# Patient Record
Sex: MALE | Race: WHITE | Employment: FULL TIME | URBAN - NONMETROPOLITAN AREA
[De-identification: names, ages, dates, MRNs, and addresses within clinical notes are randomized per-mention and may not be internally consistent; named-entity substitution may affect disease eponyms.]

---

## 2019-03-24 LAB
AVERAGE GLUCOSE: ABNORMAL
CHOLESTEROL, TOTAL: 118 MG/DL
CHOLESTEROL/HDL RATIO: 3.7
HBA1C MFR BLD: 7.5 %
HDLC SERPL-MCNC: 32 MG/DL (ref 35–70)
LDL CHOLESTEROL CALCULATED: 73 MG/DL (ref 0–160)
TRIGL SERPL-MCNC: 58 MG/DL
VLDLC SERPL CALC-MCNC: ABNORMAL MG/DL

## 2019-04-17 VITALS
BODY MASS INDEX: 36.22 KG/M2 | WEIGHT: 253 LBS | TEMPERATURE: 97 F | DIASTOLIC BLOOD PRESSURE: 70 MMHG | SYSTOLIC BLOOD PRESSURE: 120 MMHG | HEIGHT: 70 IN | HEART RATE: 100 BPM

## 2019-04-17 RX ORDER — VARENICLINE TARTRATE 0.5 MG/1
0.5 TABLET, FILM COATED ORAL
COMMUNITY
End: 2019-06-25

## 2019-04-17 RX ORDER — VARENICLINE TARTRATE 1 MG/1
1 TABLET, FILM COATED ORAL 2 TIMES DAILY
COMMUNITY
End: 2019-06-25

## 2019-06-25 ENCOUNTER — OFFICE VISIT (OUTPATIENT)
Dept: FAMILY MEDICINE CLINIC | Age: 54
End: 2019-06-25
Payer: COMMERCIAL

## 2019-06-25 VITALS
WEIGHT: 259 LBS | BODY MASS INDEX: 37.08 KG/M2 | SYSTOLIC BLOOD PRESSURE: 118 MMHG | HEIGHT: 70 IN | HEART RATE: 104 BPM | DIASTOLIC BLOOD PRESSURE: 84 MMHG | TEMPERATURE: 98.7 F | OXYGEN SATURATION: 98 %

## 2019-06-25 DIAGNOSIS — Z79.4 TYPE 2 DIABETES MELLITUS WITH COMPLICATION, WITH LONG-TERM CURRENT USE OF INSULIN (HCC): Primary | ICD-10-CM

## 2019-06-25 DIAGNOSIS — N52.9 ERECTILE DYSFUNCTION, UNSPECIFIED ERECTILE DYSFUNCTION TYPE: ICD-10-CM

## 2019-06-25 DIAGNOSIS — E11.8 TYPE 2 DIABETES MELLITUS WITH COMPLICATION, WITH LONG-TERM CURRENT USE OF INSULIN (HCC): Primary | ICD-10-CM

## 2019-06-25 DIAGNOSIS — I10 BENIGN ESSENTIAL HYPERTENSION: ICD-10-CM

## 2019-06-25 DIAGNOSIS — Z72.0 TOBACCO USE: ICD-10-CM

## 2019-06-25 DIAGNOSIS — M70.21 OLECRANON BURSITIS OF RIGHT ELBOW: ICD-10-CM

## 2019-06-25 DIAGNOSIS — E78.2 MIXED HYPERLIPIDEMIA: ICD-10-CM

## 2019-06-25 DIAGNOSIS — M15.9 GENERALIZED OSTEOARTHRITIS: ICD-10-CM

## 2019-06-25 PROCEDURE — 99214 OFFICE O/P EST MOD 30 MIN: CPT | Performed by: INTERNAL MEDICINE

## 2019-06-25 RX ORDER — METHYLPREDNISOLONE 4 MG/1
TABLET ORAL
Qty: 1 KIT | Refills: 0 | Status: SHIPPED | OUTPATIENT
Start: 2019-06-25 | End: 2019-07-01

## 2019-06-25 RX ORDER — DULAGLUTIDE 1.5 MG/.5ML
1.5 INJECTION, SOLUTION SUBCUTANEOUS
Refills: 1 | COMMUNITY
Start: 2019-06-04 | End: 2022-03-14 | Stop reason: DRUGHIGH

## 2019-06-25 RX ORDER — LISINOPRIL 20 MG/1
20 TABLET ORAL 2 TIMES DAILY
Qty: 180 TABLET | Refills: 1 | Status: SHIPPED | OUTPATIENT
Start: 2019-06-25 | End: 2019-12-18 | Stop reason: SDUPTHER

## 2019-06-25 RX ORDER — DAPAGLIFLOZIN 10 MG/1
1 TABLET, FILM COATED ORAL DAILY
Refills: 1 | COMMUNITY
Start: 2019-04-02 | End: 2020-12-18 | Stop reason: ALTCHOICE

## 2019-06-25 ASSESSMENT — PATIENT HEALTH QUESTIONNAIRE - PHQ9
2. FEELING DOWN, DEPRESSED OR HOPELESS: 1
SUM OF ALL RESPONSES TO PHQ QUESTIONS 1-9: 1
SUM OF ALL RESPONSES TO PHQ QUESTIONS 1-9: 1
SUM OF ALL RESPONSES TO PHQ9 QUESTIONS 1 & 2: 1
1. LITTLE INTEREST OR PLEASURE IN DOING THINGS: 0

## 2019-06-25 ASSESSMENT — ENCOUNTER SYMPTOMS
SHORTNESS OF BREATH: 0
ABDOMINAL PAIN: 0
CHEST TIGHTNESS: 0
RHINORRHEA: 0
CONSTIPATION: 0
SORE THROAT: 0
NAUSEA: 0
BLOOD IN STOOL: 0
VOMITING: 0
EYE PAIN: 0
DIARRHEA: 0
COUGH: 0

## 2019-06-25 NOTE — PROGRESS NOTES
[facility]) Physicians   Internal Medicine     2019  Carlos Manuel Varner : 1965 Sex: male  Age:53 y.o. Chief Complaint   Patient presents with    Hypertension        HPI:   Patient presents to office for review and management of chronic medical conditions. States has swelling to right elbow. Not painful. Asking for aspiration     - States still has nasal congestion Some rhinorrhea. No ear pain. No facial pressure. No fever of chills. No chest pain, SOB. States cough. States taking allavert. Has ProAir inhaler if needed, uses sparingly.    - States still having bilateral knee pain. States has seen ortho and had injection to left knee. No erythema. No buckling. No locking. Worse with ambulation. No hip pain. no back pain. Taking OTC supplement (aleve). No follow up. - States erectile issues are stable. States having difficulty maintaining erection. Stable. - States has had follow up with endocrinology. Basaglar 18 units and glipizide 5mg 2 tabs am and 2 pm  and metformin tid. On Farxiga. On trulicity. States some hypoglycemia. States has had eye exam and  podiatry exam. States sugar running 120-140. All < 200. States walking alot. States last A1c was 7.5 per  Patient (2019), follow up 2019    - States has high cholesterol. States was placed on Lipitor by endocrinology. States watches diet. - States high blood pressure. States does not check blood pressure at home. On lisinopril. No reported  side effects.    - States walks 8-10 miles per day at work. Health Maintenance   - immunizations:   Influenza Vaccination - (2018)  Pneumonia Vaccination - declines   Shingles vaccine (shingrix) - declines  Tetanus Vaccination - ()     - Screenings:   Colonoscopy  - (2017) - normal - follow up 10yrs  Prostate     Couseled on Home Safety     ROS:  Review of Systems   Constitutional: Negative for appetite change, chills, fever and unexpected weight change.    HENT: Negative for congestion, rhinorrhea and sore throat. Eyes: Negative for pain and visual disturbance. Respiratory: Negative for cough, chest tightness and shortness of breath. Cardiovascular: Negative for chest pain and palpitations. Gastrointestinal: Negative for abdominal pain, blood in stool, constipation, diarrhea, nausea and vomiting. Genitourinary: Negative for difficulty urinating, dysuria, hematuria, scrotal swelling, testicular pain and urgency. Musculoskeletal: Positive for arthralgias. Negative for gait problem. Skin: Negative for rash. Neurological: Negative for dizziness, syncope, weakness, light-headedness and headaches. Hematological: Negative for adenopathy. Does not bruise/bleed easily. Psychiatric/Behavioral: Negative for suicidal ideas. The patient is not nervous/anxious. Current Outpatient Medications:     insulin glargine (BASAGLAR KWIKPEN) 100 UNIT/ML injection pen, Inject 18 Units into the skin every morning, Disp: , Rfl:     FARXIGA 10 MG tablet, Take 1 tablet by mouth daily, Disp: , Rfl: 1    TRULICITY 1.5 GE/5.7GQ SOPN, 1.5 mg by Subcutaneous Infusion route every 7 days, Disp: , Rfl: 1    lisinopril (PRINIVIL;ZESTRIL) 20 MG tablet, Take 1 tablet by mouth 2 times daily, Disp: 180 tablet, Rfl: 1    methylPREDNISolone (MEDROL, ALVIN,) 4 MG tablet, Take by mouth., Disp: 1 kit, Rfl: 0    metFORMIN (GLUCOPHAGE) 850 MG tablet, Take 500 mg by mouth 3 times daily , Disp: , Rfl:     glipiZIDE (GLUCOTROL) 5 MG tablet, Take 5 mg by mouth 2 times daily (before meals) 2 po q am and 1 po q pm, Disp: , Rfl:     Allergies   Allergen Reactions    No Known Allergies        Past Medical History:   Diagnosis Date    Depression     Hyperlipidemia     Hypertension     Type 2 diabetes mellitus without complication (HCC)        Past Surgical History:   Procedure Laterality Date    SHOULDER ARTHROSCOPY Right     VASECTOMY         No family history on file.     Social History Socioeconomic History    Marital status:      Spouse name: Not on file    Number of children: Not on file    Years of education: Not on file    Highest education level: Not on file   Occupational History    Not on file   Social Needs    Financial resource strain: Not on file    Food insecurity:     Worry: Not on file     Inability: Not on file    Transportation needs:     Medical: Not on file     Non-medical: Not on file   Tobacco Use    Smoking status: Current Some Day Smoker    Smokeless tobacco: Former User     Types: Chew    Tobacco comment: 5-6 cigarettes qd, ocassional cigar   Substance and Sexual Activity    Alcohol use: Yes     Alcohol/week: 2.4 oz     Types: 4 Cans of beer per week    Drug use: No    Sexual activity: Not on file   Lifestyle    Physical activity:     Days per week: Not on file     Minutes per session: Not on file    Stress: Not on file   Relationships    Social connections:     Talks on phone: Not on file     Gets together: Not on file     Attends Anabaptism service: Not on file     Active member of club or organization: Not on file     Attends meetings of clubs or organizations: Not on file     Relationship status: Not on file    Intimate partner violence:     Fear of current or ex partner: Not on file     Emotionally abused: Not on file     Physically abused: Not on file     Forced sexual activity: Not on file   Other Topics Concern    Not on file   Social History Narrative    Not on file       Vitals:    06/25/19 1417   BP: 118/84   Pulse: 104   Temp: 98.7 °F (37.1 °C)   SpO2: 98%   Weight: 259 lb (117.5 kg)   Height: 5' 10\" (1.778 m)       Exam:  Physical Exam   Constitutional: He is oriented to person, place, and time. He appears well-developed and well-nourished. HENT:   Head: Normocephalic and atraumatic.    Right Ear: External ear normal.   Left Ear: External ear normal.   Mouth/Throat: Oropharynx is clear and moist.   Eyes: Pupils are equal, round, and reactive to light. EOM are normal.   Neck: Normal range of motion. Neck supple. No thyromegaly present. Cardiovascular: Normal rate, regular rhythm and normal heart sounds. No murmur heard. Pulmonary/Chest: Effort normal and breath sounds normal. He has no wheezes. He has no rales. Abdominal: Soft. Bowel sounds are normal. There is no tenderness. There is no rebound and no guarding. Musculoskeletal: Normal range of motion. He exhibits no edema. Lymphadenopathy:     He has no cervical adenopathy. Neurological: He is alert and oriented to person, place, and time. No cranial nerve deficit. Skin: Skin is warm and dry. Psychiatric: He has a normal mood and affect. Judgment normal.       Assessment and Plan:    Adrian Reyes was seen today for hypertension. Diagnoses and all orders for this visit:    Type 2 diabetes mellitus with complication, with long-term current use of insulin ([address]  - continue present treatment  - following with endocrinology  - now on Basaglar 18 units  - on glucotrol, metformin farxiga and trulicity  - last R9Z was 7.5 (4/2019)    On Statin   On ACE  follows with optho     Benign essential hypertension  - continue present treatment  - monitor blood pressure at home  - watch diet, low sodium   - on lisinopril   - Stable     Mixed hyperlipidemia  - continue present treatment  - watch diet   - on lipitor  - follow labs     Olecranon bursitis of right elbow  - medrol pack  - if not better ortho      Generalized osteoarthritis  - following with ortho  - s/p synvisc to knee   - stable    Tobacco use  - Counseled to stop smoking   - could not afford chantix  - discussed wellbutrin     Erectile dysfunction, unspecified erectile dysfunction type  - continue present treatment  - poss related to DM and HTN  - stable    Return in about 3 months (around 9/25/2019) for check up and review.       Orders Placed This Encounter   Procedures    Comprehensive Metabolic Panel     Standing Status: Future     Standing Expiration Date:   6/25/2020    CBC with Differential     Standing Status:   Future     Standing Expiration Date:   6/25/2020    Magnesium     Standing Status:   Future     Standing Expiration Date:   6/25/2020    Lipid, Fasting     Standing Status:   Future     Standing Expiration Date:   6/25/2020    Urinalysis     Standing Status:   Future     Standing Expiration Date:   6/25/2020   Darin Daley     Standing Status:   Future     Standing Expiration Date:   6/25/2020       Mario Latham MD  6/25/2019  2:48 PM

## 2019-06-25 NOTE — LETTER
[facility], [facility] [address]  Phone: [phone]  Fax: [phone]    Shane Grande MD        June 25, 2019     Patient: Arturo Aparicio   YOB: 1965   Date of Visit: 6/25/2019       To Whom it May Concern:    Arturo Aparicio was seen in my clinic on 6/25/2019. If you have any questions or concerns, please don't hesitate to call.     Sincerely,         Shane Grande MD

## 2019-07-23 ENCOUNTER — OFFICE VISIT (OUTPATIENT)
Dept: FAMILY MEDICINE CLINIC | Age: 54
End: 2019-07-23
Payer: COMMERCIAL

## 2019-07-23 VITALS
BODY MASS INDEX: 37.8 KG/M2 | SYSTOLIC BLOOD PRESSURE: 110 MMHG | DIASTOLIC BLOOD PRESSURE: 80 MMHG | TEMPERATURE: 97.7 F | HEIGHT: 70 IN | WEIGHT: 264 LBS | OXYGEN SATURATION: 95 % | HEART RATE: 112 BPM

## 2019-07-23 DIAGNOSIS — J06.9 ACUTE UPPER RESPIRATORY INFECTION: Primary | ICD-10-CM

## 2019-07-23 PROCEDURE — 99213 OFFICE O/P EST LOW 20 MIN: CPT | Performed by: INTERNAL MEDICINE

## 2019-07-23 PROCEDURE — 96372 THER/PROPH/DIAG INJ SC/IM: CPT | Performed by: INTERNAL MEDICINE

## 2019-07-23 RX ORDER — TRIAMCINOLONE ACETONIDE 40 MG/ML
40 INJECTION, SUSPENSION INTRA-ARTICULAR; INTRAMUSCULAR ONCE
Status: COMPLETED | OUTPATIENT
Start: 2019-07-23 | End: 2019-07-23

## 2019-07-23 RX ORDER — DOXYCYCLINE HYCLATE 100 MG
100 TABLET ORAL 2 TIMES DAILY
Qty: 20 TABLET | Refills: 0 | Status: SHIPPED | OUTPATIENT
Start: 2019-07-23 | End: 2019-08-02

## 2019-07-23 RX ORDER — BENZONATATE 100 MG/1
100 CAPSULE ORAL 3 TIMES DAILY PRN
Qty: 30 CAPSULE | Refills: 0 | Status: SHIPPED | OUTPATIENT
Start: 2019-07-23 | End: 2019-07-30

## 2019-07-23 RX ADMIN — TRIAMCINOLONE ACETONIDE 40 MG: 40 INJECTION, SUSPENSION INTRA-ARTICULAR; INTRAMUSCULAR at 11:38

## 2019-07-25 LAB
AVERAGE GLUCOSE: NORMAL
HBA1C MFR BLD: 8.5 %

## 2019-09-11 RX ORDER — CEFDINIR 300 MG/1
300 CAPSULE ORAL 2 TIMES DAILY
COMMUNITY
End: 2019-09-20 | Stop reason: ALTCHOICE

## 2019-09-11 RX ORDER — ALBUTEROL SULFATE 90 UG/1
2 AEROSOL, METERED RESPIRATORY (INHALATION)
COMMUNITY
End: 2019-12-18 | Stop reason: SDUPTHER

## 2019-09-11 RX ORDER — VARENICLINE TARTRATE 1 MG/1
1 TABLET, FILM COATED ORAL 2 TIMES DAILY
COMMUNITY
End: 2019-09-20

## 2019-09-11 RX ORDER — ATORVASTATIN CALCIUM 40 MG/1
40 TABLET, FILM COATED ORAL DAILY
COMMUNITY

## 2019-09-20 ENCOUNTER — OFFICE VISIT (OUTPATIENT)
Dept: FAMILY MEDICINE CLINIC | Age: 54
End: 2019-09-20
Payer: COMMERCIAL

## 2019-09-20 VITALS
BODY MASS INDEX: 36.08 KG/M2 | HEIGHT: 70 IN | HEART RATE: 109 BPM | SYSTOLIC BLOOD PRESSURE: 114 MMHG | OXYGEN SATURATION: 98 % | WEIGHT: 252 LBS | TEMPERATURE: 98.9 F | DIASTOLIC BLOOD PRESSURE: 80 MMHG

## 2019-09-20 DIAGNOSIS — N52.9 ERECTILE DYSFUNCTION, UNSPECIFIED ERECTILE DYSFUNCTION TYPE: ICD-10-CM

## 2019-09-20 DIAGNOSIS — I10 BENIGN ESSENTIAL HYPERTENSION: ICD-10-CM

## 2019-09-20 DIAGNOSIS — E11.8 TYPE 2 DIABETES MELLITUS WITH COMPLICATION, WITH LONG-TERM CURRENT USE OF INSULIN (HCC): Primary | ICD-10-CM

## 2019-09-20 DIAGNOSIS — M15.9 GENERALIZED OSTEOARTHRITIS: ICD-10-CM

## 2019-09-20 DIAGNOSIS — J30.9 CHRONIC ALLERGIC RHINITIS: ICD-10-CM

## 2019-09-20 DIAGNOSIS — Z79.4 TYPE 2 DIABETES MELLITUS WITH COMPLICATION, WITH LONG-TERM CURRENT USE OF INSULIN (HCC): Primary | ICD-10-CM

## 2019-09-20 DIAGNOSIS — Z72.0 TOBACCO USE: ICD-10-CM

## 2019-09-20 DIAGNOSIS — E78.2 MIXED HYPERLIPIDEMIA: ICD-10-CM

## 2019-09-20 DIAGNOSIS — M70.21 OLECRANON BURSITIS OF RIGHT ELBOW: ICD-10-CM

## 2019-09-20 PROCEDURE — G8417 CALC BMI ABV UP PARAM F/U: HCPCS | Performed by: INTERNAL MEDICINE

## 2019-09-20 PROCEDURE — 3045F PR MOST RECENT HEMOGLOBIN A1C LEVEL 7.0-9.0%: CPT | Performed by: INTERNAL MEDICINE

## 2019-09-20 PROCEDURE — G8427 DOCREV CUR MEDS BY ELIG CLIN: HCPCS | Performed by: INTERNAL MEDICINE

## 2019-09-20 PROCEDURE — 4004F PT TOBACCO SCREEN RCVD TLK: CPT | Performed by: INTERNAL MEDICINE

## 2019-09-20 PROCEDURE — 99213 OFFICE O/P EST LOW 20 MIN: CPT | Performed by: INTERNAL MEDICINE

## 2019-09-20 PROCEDURE — 2022F DILAT RTA XM EVC RTNOPTHY: CPT | Performed by: INTERNAL MEDICINE

## 2019-09-20 PROCEDURE — 3017F COLORECTAL CA SCREEN DOC REV: CPT | Performed by: INTERNAL MEDICINE

## 2019-09-20 RX ORDER — MONTELUKAST SODIUM 10 MG/1
10 TABLET ORAL DAILY
Qty: 30 TABLET | Refills: 3 | Status: SHIPPED | OUTPATIENT
Start: 2019-09-20 | End: 2019-12-18 | Stop reason: SDUPTHER

## 2019-09-20 ASSESSMENT — ENCOUNTER SYMPTOMS
NAUSEA: 0
RHINORRHEA: 0
COUGH: 0
CHEST TIGHTNESS: 0
EYE PAIN: 0
SORE THROAT: 0
ABDOMINAL PAIN: 0
SHORTNESS OF BREATH: 0
DIARRHEA: 0
VOMITING: 0
CONSTIPATION: 0
BLOOD IN STOOL: 0

## 2019-09-20 NOTE — PROGRESS NOTES
syndrome     Hyperlipidemia     Hypertension     Low back pain     Neck pain     Neck sprain     Psychosexual dysfunction associated with inhibited libido     Spasm     Type 2 diabetes mellitus without complication [facility])        Past Surgical History:   Procedure Laterality Date    COLONOSCOPY      12/8/2017 normal     SHOULDER ARTHROSCOPY Right     VASECTOMY  04/2017       No family history on file. Social History     Socioeconomic History    Marital status:      Spouse name: Not on file    Number of children: Not on file    Years of education: Not on file    Highest education level: Not on file   Occupational History    Not on file   Social Needs    Financial resource strain: Not on file    Food insecurity:     Worry: Not on file     Inability: Not on file    Transportation needs:     Medical: Not on file     Non-medical: Not on file   Tobacco Use    Smoking status: Current Some Day Smoker    Smokeless tobacco: Former User     Types: Chew    Tobacco comment: 5-6 cigarettes qd, ocassional cigar   Substance and Sexual Activity    Alcohol use:  Yes     Alcohol/week: 4.0 standard drinks     Types: 4 Cans of beer per week    Drug use: No    Sexual activity: Not on file   Lifestyle    Physical activity:     Days per week: Not on file     Minutes per session: Not on file    Stress: Not on file   Relationships    Social connections:     Talks on phone: Not on file     Gets together: Not on file     Attends Confucianist service: Not on file     Active member of club or organization: Not on file     Attends meetings of clubs or organizations: Not on file     Relationship status: Not on file    Intimate partner violence:     Fear of current or ex partner: Not on file     Emotionally abused: Not on file     Physically abused: Not on file     Forced sexual activity: Not on file   Other Topics Concern    Not on file   Social History Narrative    Not on file       Vitals:    09/20/19 1531 BP: 114/80   Pulse: 109   Temp: 98.9 °F (37.2 °C)   SpO2: 98%   Weight: 252 lb (114.3 kg)   Height: 5' 10\" (1.778 m)       Exam:  Physical Exam   Constitutional: He is oriented to person, place, and time. He appears well-developed and well-nourished. HENT:   Head: Normocephalic and atraumatic. Right Ear: External ear normal.   Left Ear: External ear normal.   Mouth/Throat: Oropharynx is clear and moist.   Eyes: Pupils are equal, round, and reactive to light. EOM are normal.   Neck: Normal range of motion. Neck supple. No thyromegaly present. Cardiovascular: Normal rate, regular rhythm and normal heart sounds. No murmur heard. Pulmonary/Chest: Effort normal and breath sounds normal. He has no wheezes. He has no rales. Abdominal: Soft. Bowel sounds are normal. There is no tenderness. There is no rebound and no guarding. Musculoskeletal: Normal range of motion. He exhibits no edema. Lymphadenopathy:     He has no cervical adenopathy. Neurological: He is alert and oriented to person, place, and time. No cranial nerve deficit. Skin: Skin is warm and dry. Psychiatric: He has a normal mood and affect. Judgment normal.       Assessment and Plan:    Elli Roberto was seen today for hypertension.     Diagnoses and all orders for this visit:    Chronic allergic rhinitis  - not improving   - hs tried antihistamine   - recommend floase or nasacort  - add Singulair   - declines pulm for poss asthma     Type 2 diabetes mellitus with complication, with long-term current use of insulin ([address]  - continue present treatment  - following with endocrinology  - now on Basaglar 18 units  - on glucotrol, metformin farxiga and trulicity  - last O8H was 7.5 (4/2019)    On Statin   On ACE  follows with optho     Benign essential hypertension  - continue present treatment  - monitor blood pressure at home  - watch diet, low sodium   - on lisinopril   - Stable     Mixed hyperlipidemia  - continue present treatment  - watch diet

## 2019-10-31 LAB
AVERAGE GLUCOSE: NORMAL
HBA1C MFR BLD: 7.8 %

## 2019-12-18 ENCOUNTER — TELEPHONE (OUTPATIENT)
Dept: FAMILY MEDICINE CLINIC | Age: 54
End: 2019-12-18

## 2019-12-18 ENCOUNTER — OFFICE VISIT (OUTPATIENT)
Dept: FAMILY MEDICINE CLINIC | Age: 54
End: 2019-12-18
Payer: COMMERCIAL

## 2019-12-18 ENCOUNTER — HOSPITAL ENCOUNTER (OUTPATIENT)
Age: 54
Discharge: HOME | End: 2019-12-20
Payer: COMMERCIAL

## 2019-12-18 VITALS
TEMPERATURE: 97.8 F | WEIGHT: 252 LBS | BODY MASS INDEX: 36.08 KG/M2 | DIASTOLIC BLOOD PRESSURE: 80 MMHG | HEART RATE: 104 BPM | OXYGEN SATURATION: 95 % | HEIGHT: 70 IN | SYSTOLIC BLOOD PRESSURE: 104 MMHG

## 2019-12-18 DIAGNOSIS — Z72.0 TOBACCO USE: ICD-10-CM

## 2019-12-18 DIAGNOSIS — M15.9 GENERALIZED OSTEOARTHRITIS: ICD-10-CM

## 2019-12-18 DIAGNOSIS — J30.9 CHRONIC ALLERGIC RHINITIS: ICD-10-CM

## 2019-12-18 DIAGNOSIS — Z23 NEED FOR IMMUNIZATION AGAINST INFLUENZA: Primary | ICD-10-CM

## 2019-12-18 DIAGNOSIS — R53.83 OTHER FATIGUE: ICD-10-CM

## 2019-12-18 DIAGNOSIS — M25.511 ACUTE PAIN OF RIGHT SHOULDER: ICD-10-CM

## 2019-12-18 DIAGNOSIS — E78.2 MIXED HYPERLIPIDEMIA: ICD-10-CM

## 2019-12-18 DIAGNOSIS — E55.9 VITAMIN D DEFICIENCY: ICD-10-CM

## 2019-12-18 DIAGNOSIS — Z79.4 TYPE 2 DIABETES MELLITUS WITH COMPLICATION, WITH LONG-TERM CURRENT USE OF INSULIN (HCC): ICD-10-CM

## 2019-12-18 DIAGNOSIS — Z12.5 SCREENING FOR MALIGNANT NEOPLASM OF PROSTATE: ICD-10-CM

## 2019-12-18 DIAGNOSIS — Z79.4 TYPE 2 DIABETES MELLITUS WITHOUT COMPLICATION, WITH LONG-TERM CURRENT USE OF INSULIN (HCC): ICD-10-CM

## 2019-12-18 DIAGNOSIS — E11.8 TYPE 2 DIABETES MELLITUS WITH COMPLICATION, WITH LONG-TERM CURRENT USE OF INSULIN (HCC): ICD-10-CM

## 2019-12-18 DIAGNOSIS — N52.9 ERECTILE DYSFUNCTION, UNSPECIFIED ERECTILE DYSFUNCTION TYPE: ICD-10-CM

## 2019-12-18 DIAGNOSIS — E11.9 TYPE 2 DIABETES MELLITUS WITHOUT COMPLICATION, WITH LONG-TERM CURRENT USE OF INSULIN (HCC): ICD-10-CM

## 2019-12-18 DIAGNOSIS — M70.21 OLECRANON BURSITIS OF RIGHT ELBOW: ICD-10-CM

## 2019-12-18 DIAGNOSIS — I10 BENIGN ESSENTIAL HYPERTENSION: ICD-10-CM

## 2019-12-18 LAB
ALBUMIN SERPL-MCNC: 4.5 G/DL (ref 3.5–5.2)
ALP BLD-CCNC: 85 U/L (ref 40–129)
ALT SERPL-CCNC: 48 U/L (ref 0–40)
ANION GAP SERPL CALCULATED.3IONS-SCNC: 15 MMOL/L (ref 7–16)
AST SERPL-CCNC: 30 U/L (ref 0–39)
BASOPHILS ABSOLUTE: 0.07 E9/L (ref 0–0.2)
BASOPHILS RELATIVE PERCENT: 0.8 % (ref 0–2)
BILIRUB SERPL-MCNC: 0.6 MG/DL (ref 0–1.2)
BILIRUBIN URINE: NEGATIVE
BLOOD, URINE: NEGATIVE
BUN BLDV-MCNC: 17 MG/DL (ref 6–20)
CALCIUM SERPL-MCNC: 9.3 MG/DL (ref 8.6–10.2)
CHLORIDE BLD-SCNC: 102 MMOL/L (ref 98–107)
CHOLESTEROL, FASTING: 154 MG/DL (ref 0–199)
CLARITY: CLEAR
CO2: 22 MMOL/L (ref 22–29)
COLOR: YELLOW
CREAT SERPL-MCNC: 0.8 MG/DL (ref 0.7–1.2)
EOSINOPHILS ABSOLUTE: 0.22 E9/L (ref 0.05–0.5)
EOSINOPHILS RELATIVE PERCENT: 2.4 % (ref 0–6)
GFR AFRICAN AMERICAN: >60
GFR NON-AFRICAN AMERICAN: >60 ML/MIN/1.73
GLUCOSE BLD-MCNC: 127 MG/DL (ref 74–99)
GLUCOSE URINE: 500 MG/DL
HCT VFR BLD CALC: 50.3 % (ref 37–54)
HDLC SERPL-MCNC: 33 MG/DL
HEMOGLOBIN: 16.4 G/DL (ref 12.5–16.5)
IMMATURE GRANULOCYTES #: 0.05 E9/L
IMMATURE GRANULOCYTES %: 0.5 % (ref 0–5)
KETONES, URINE: NEGATIVE MG/DL
LDL CHOLESTEROL CALCULATED: 103 MG/DL (ref 0–99)
LEUKOCYTE ESTERASE, URINE: NEGATIVE
LYMPHOCYTES ABSOLUTE: 2.04 E9/L (ref 1.5–4)
LYMPHOCYTES RELATIVE PERCENT: 22.4 % (ref 20–42)
MAGNESIUM: 1.7 MG/DL (ref 1.6–2.6)
MCH RBC QN AUTO: 30.1 PG (ref 26–35)
MCHC RBC AUTO-ENTMCNC: 32.6 % (ref 32–34.5)
MCV RBC AUTO: 92.3 FL (ref 80–99.9)
MICROALBUMIN UR-MCNC: 21.5 MG/L
MONOCYTES ABSOLUTE: 0.69 E9/L (ref 0.1–0.95)
MONOCYTES RELATIVE PERCENT: 7.6 % (ref 2–12)
NEUTROPHILS ABSOLUTE: 6.04 E9/L (ref 1.8–7.3)
NEUTROPHILS RELATIVE PERCENT: 66.3 % (ref 43–80)
NITRITE, URINE: NEGATIVE
PDW BLD-RTO: 13.5 FL (ref 11.5–15)
PH UA: 5.5 (ref 5–9)
PLATELET # BLD: 257 E9/L (ref 130–450)
PMV BLD AUTO: 11.2 FL (ref 7–12)
POTASSIUM SERPL-SCNC: 4.2 MMOL/L (ref 3.5–5)
PROTEIN UA: NEGATIVE MG/DL
RBC # BLD: 5.45 E12/L (ref 3.8–5.8)
SODIUM BLD-SCNC: 139 MMOL/L (ref 132–146)
SPECIFIC GRAVITY UA: 1.02 (ref 1–1.03)
TOTAL PROTEIN: 7.4 G/DL (ref 6.4–8.3)
TRIGLYCERIDE, FASTING: 91 MG/DL (ref 0–149)
UROBILINOGEN, URINE: 0.2 E.U./DL
VLDLC SERPL CALC-MCNC: 18 MG/DL
WBC # BLD: 9.1 E9/L (ref 4.5–11.5)

## 2019-12-18 PROCEDURE — 3017F COLORECTAL CA SCREEN DOC REV: CPT | Performed by: INTERNAL MEDICINE

## 2019-12-18 PROCEDURE — 90686 IIV4 VACC NO PRSV 0.5 ML IM: CPT | Performed by: INTERNAL MEDICINE

## 2019-12-18 PROCEDURE — G8482 FLU IMMUNIZE ORDER/ADMIN: HCPCS | Performed by: INTERNAL MEDICINE

## 2019-12-18 PROCEDURE — 83735 ASSAY OF MAGNESIUM: CPT

## 2019-12-18 PROCEDURE — 85025 COMPLETE CBC W/AUTO DIFF WBC: CPT

## 2019-12-18 PROCEDURE — 4004F PT TOBACCO SCREEN RCVD TLK: CPT | Performed by: INTERNAL MEDICINE

## 2019-12-18 PROCEDURE — 80061 LIPID PANEL: CPT

## 2019-12-18 PROCEDURE — 90471 IMMUNIZATION ADMIN: CPT | Performed by: INTERNAL MEDICINE

## 2019-12-18 PROCEDURE — 80053 COMPREHEN METABOLIC PANEL: CPT

## 2019-12-18 PROCEDURE — 82044 UR ALBUMIN SEMIQUANTITATIVE: CPT

## 2019-12-18 PROCEDURE — 2022F DILAT RTA XM EVC RTNOPTHY: CPT | Performed by: INTERNAL MEDICINE

## 2019-12-18 PROCEDURE — G8427 DOCREV CUR MEDS BY ELIG CLIN: HCPCS | Performed by: INTERNAL MEDICINE

## 2019-12-18 PROCEDURE — 36415 COLL VENOUS BLD VENIPUNCTURE: CPT

## 2019-12-18 PROCEDURE — G8417 CALC BMI ABV UP PARAM F/U: HCPCS | Performed by: INTERNAL MEDICINE

## 2019-12-18 PROCEDURE — 99214 OFFICE O/P EST MOD 30 MIN: CPT | Performed by: INTERNAL MEDICINE

## 2019-12-18 PROCEDURE — 81003 URINALYSIS AUTO W/O SCOPE: CPT

## 2019-12-18 PROCEDURE — 3045F PR MOST RECENT HEMOGLOBIN A1C LEVEL 7.0-9.0%: CPT | Performed by: INTERNAL MEDICINE

## 2019-12-18 RX ORDER — MONTELUKAST SODIUM 10 MG/1
10 TABLET ORAL DAILY
Qty: 90 TABLET | Refills: 1 | Status: SHIPPED
Start: 2019-12-18 | End: 2020-06-11 | Stop reason: SDUPTHER

## 2019-12-18 RX ORDER — ALBUTEROL SULFATE 90 UG/1
2 AEROSOL, METERED RESPIRATORY (INHALATION) 4 TIMES DAILY PRN
Qty: 1 INHALER | Refills: 5 | Status: SHIPPED
Start: 2019-12-18 | End: 2020-06-11 | Stop reason: SDUPTHER

## 2019-12-18 RX ORDER — LISINOPRIL 20 MG/1
20 TABLET ORAL 2 TIMES DAILY
Qty: 180 TABLET | Refills: 1 | Status: SHIPPED
Start: 2019-12-18 | End: 2020-06-11 | Stop reason: SDUPTHER

## 2019-12-18 SDOH — ECONOMIC STABILITY: INCOME INSECURITY: HOW HARD IS IT FOR YOU TO PAY FOR THE VERY BASICS LIKE FOOD, HOUSING, MEDICAL CARE, AND HEATING?: NOT VERY HARD

## 2019-12-18 SDOH — ECONOMIC STABILITY: FOOD INSECURITY: WITHIN THE PAST 12 MONTHS, THE FOOD YOU BOUGHT JUST DIDN'T LAST AND YOU DIDN'T HAVE MONEY TO GET MORE.: NEVER TRUE

## 2019-12-18 SDOH — ECONOMIC STABILITY: FOOD INSECURITY: WITHIN THE PAST 12 MONTHS, YOU WORRIED THAT YOUR FOOD WOULD RUN OUT BEFORE YOU GOT MONEY TO BUY MORE.: NEVER TRUE

## 2019-12-18 SDOH — ECONOMIC STABILITY: TRANSPORTATION INSECURITY
IN THE PAST 12 MONTHS, HAS THE LACK OF TRANSPORTATION KEPT YOU FROM MEDICAL APPOINTMENTS OR FROM GETTING MEDICATIONS?: NO

## 2019-12-18 SDOH — ECONOMIC STABILITY: TRANSPORTATION INSECURITY
IN THE PAST 12 MONTHS, HAS LACK OF TRANSPORTATION KEPT YOU FROM MEETINGS, WORK, OR FROM GETTING THINGS NEEDED FOR DAILY LIVING?: NO

## 2019-12-18 ASSESSMENT — ENCOUNTER SYMPTOMS
CHEST TIGHTNESS: 0
BLOOD IN STOOL: 0
SORE THROAT: 0
NAUSEA: 0
VOMITING: 0
SHORTNESS OF BREATH: 0
ABDOMINAL PAIN: 0
CONSTIPATION: 0
COUGH: 0
DIARRHEA: 0
EYE PAIN: 0
RHINORRHEA: 0

## 2020-01-28 LAB
AVERAGE GLUCOSE: NORMAL
HBA1C MFR BLD: 8.3 %

## 2020-02-06 ENCOUNTER — TELEPHONE (OUTPATIENT)
Dept: FAMILY MEDICINE CLINIC | Age: 55
End: 2020-02-06

## 2020-02-06 ENCOUNTER — OFFICE VISIT (OUTPATIENT)
Dept: FAMILY MEDICINE CLINIC | Age: 55
End: 2020-02-06
Payer: COMMERCIAL

## 2020-02-06 VITALS
SYSTOLIC BLOOD PRESSURE: 140 MMHG | DIASTOLIC BLOOD PRESSURE: 80 MMHG | TEMPERATURE: 98.2 F | HEART RATE: 117 BPM | OXYGEN SATURATION: 96 % | WEIGHT: 251 LBS | BODY MASS INDEX: 35.93 KG/M2 | HEIGHT: 70 IN

## 2020-02-06 LAB
INFLUENZA A ANTIBODY: NEGATIVE
INFLUENZA B ANTIBODY: NEGATIVE

## 2020-02-06 PROCEDURE — 99213 OFFICE O/P EST LOW 20 MIN: CPT | Performed by: PHYSICIAN ASSISTANT

## 2020-02-06 PROCEDURE — 87804 INFLUENZA ASSAY W/OPTIC: CPT | Performed by: PHYSICIAN ASSISTANT

## 2020-02-06 RX ORDER — AMOXICILLIN 500 MG/1
500 CAPSULE ORAL 3 TIMES DAILY
Qty: 30 CAPSULE | Refills: 0 | Status: SHIPPED | OUTPATIENT
Start: 2020-02-06 | End: 2020-02-16

## 2020-02-06 RX ORDER — OSELTAMIVIR PHOSPHATE 75 MG/1
75 CAPSULE ORAL 2 TIMES DAILY
Qty: 10 CAPSULE | Refills: 0 | Status: SHIPPED
Start: 2020-02-06 | End: 2020-02-26

## 2020-02-06 RX ORDER — PREDNISONE 20 MG/1
TABLET ORAL
Qty: 10 TABLET | Refills: 0 | Status: SHIPPED
Start: 2020-02-06 | End: 2020-02-26

## 2020-02-06 ASSESSMENT — ENCOUNTER SYMPTOMS
CHOKING: 0
EYES NEGATIVE: 1
SINUS PAIN: 1
STRIDOR: 0
COUGH: 1
SORE THROAT: 1
SINUS PRESSURE: 1
APNEA: 0
WHEEZING: 0
SHORTNESS OF BREATH: 0
CHEST TIGHTNESS: 0
TROUBLE SWALLOWING: 0
GASTROINTESTINAL NEGATIVE: 1

## 2020-02-06 NOTE — PROGRESS NOTES
Chief Complaint   Patient presents with    Diarrhea     Onset 2 days, has been taking immodium with no relief    Fever    Fatigue    Congestion     Onset 2days       HPI:  Patient presents today for diarrhea for the last couple days but it has stopped. Fatigue, fever, cough, congestion. Current Outpatient Medications:     oseltamivir (TAMIFLU) 75 MG capsule, Take 1 capsule by mouth 2 times daily, Disp: 10 capsule, Rfl: 0    amoxicillin (AMOXIL) 500 MG capsule, Take 1 capsule by mouth 3 times daily for 10 days, Disp: 30 capsule, Rfl: 0    predniSONE (DELTASONE) 20 MG tablet, 2 tabs Qam, Disp: 10 tablet, Rfl: 0    montelukast (SINGULAIR) 10 MG tablet, Take 1 tablet by mouth daily, Disp: 90 tablet, Rfl: 1    lisinopril (PRINIVIL;ZESTRIL) 20 MG tablet, Take 1 tablet by mouth 2 times daily, Disp: 180 tablet, Rfl: 1    albuterol sulfate  (90 Base) MCG/ACT inhaler, Inhale 2 puffs into the lungs 4 times daily as needed for Wheezing Every 4-6 hours as needed, Disp: 1 Inhaler, Rfl: 5    atorvastatin (LIPITOR) 40 MG tablet, Take 40 mg by mouth daily, Disp: , Rfl:     insulin glargine (BASAGLAR KWIKPEN) 100 UNIT/ML injection pen, Inject 21 Units into the skin every morning , Disp: , Rfl:     FARXIGA 10 MG tablet, Take 1 tablet by mouth daily, Disp: , Rfl: 1    TRULICITY 1.5 XZ/0.1HS SOPN, 1.5 mg by Subcutaneous Infusion route every 7 days, Disp: , Rfl: 1    metFORMIN (GLUCOPHAGE) 850 MG tablet, Take 850 mg by mouth 3 times daily , Disp: , Rfl:     glipiZIDE (GLUCOTROL) 5 MG tablet, Take by mouth 3 po q am and 1 po q pm, Disp: , Rfl:        Allergies   Allergen Reactions    No Known Allergies          Review of Systems  Review of Systems   Constitutional: Negative for chills, fatigue and fever. HENT: Positive for congestion, sinus pressure, sinus pain and sore throat. Negative for ear discharge, ear pain and trouble swallowing. Eyes: Negative. Respiratory: Positive for cough.  Negative for apnea, choking, chest tightness, shortness of breath, wheezing and stridor. Cardiovascular: Negative. Gastrointestinal: Negative. VS:  BP (!) 140/80   Pulse 117   Temp 98.2 °F (36.8 °C) (Temporal)   Ht 5' 10\" (1.778 m)   Wt 251 lb (113.9 kg)   SpO2 96%   BMI 36.01 kg/m²     Patient's medical, social, and family history reviewed      Physical Exam  Physical Exam  Vitals signs and nursing note reviewed. Constitutional:       General: He is not in acute distress. Appearance: Normal appearance. He is normal weight. He is not toxic-appearing. HENT:      Head: Normocephalic. Right Ear: Tympanic membrane, ear canal and external ear normal. There is no impacted cerumen. Left Ear: Tympanic membrane, ear canal and external ear normal. There is no impacted cerumen. Nose: Congestion and rhinorrhea present. Mouth/Throat:      Pharynx: Oropharyngeal exudate present. Eyes:      Conjunctiva/sclera: Conjunctivae normal.      Pupils: Pupils are equal, round, and reactive to light. Neck:      Musculoskeletal: Normal range of motion and neck supple. Cardiovascular:      Rate and Rhythm: Normal rate and regular rhythm. Pulses: Normal pulses. Heart sounds: Normal heart sounds. Pulmonary:      Effort: Pulmonary effort is normal.      Breath sounds: Rales present. Neurological:      Mental Status: He is alert. Assessment/Plan:  Rehan Medina was seen today for diarrhea, fever, fatigue and congestion. Diagnoses and all orders for this visit:    Fever, unspecified fever cause  -     POCT Influenza A/B  -     oseltamivir (TAMIFLU) 75 MG capsule; Take 1 capsule by mouth 2 times daily  -     predniSONE (DELTASONE) 20 MG tablet; 2 tabs Qam    Flu-like symptoms  -     oseltamivir (TAMIFLU) 75 MG capsule; Take 1 capsule by mouth 2 times daily  -     predniSONE (DELTASONE) 20 MG tablet; 2 tabs Qam    Acute URI  -     amoxicillin (AMOXIL) 500 MG capsule;  Take 1 capsule by mouth 3 times daily for 10 days  -     predniSONE (DELTASONE) 20 MG tablet; 2 tabs Qam    Negative influenza. Pt. to follow up if PCP if no better 1 week.      Sagar Hebert PA-C

## 2020-02-06 NOTE — TELEPHONE ENCOUNTER
Daren calling to ask if you can give him something for his flu like sx without seeing him? He has body aches and chills, and has has diarrhea for 2 days. I did encourage him to come to [facility] because its not appropriate to treat this over the phone. He still wanted to get this message to you.

## 2020-02-26 ENCOUNTER — OFFICE VISIT (OUTPATIENT)
Dept: FAMILY MEDICINE CLINIC | Age: 55
End: 2020-02-26
Payer: COMMERCIAL

## 2020-02-26 VITALS
WEIGHT: 254 LBS | BODY MASS INDEX: 36.36 KG/M2 | HEART RATE: 114 BPM | DIASTOLIC BLOOD PRESSURE: 60 MMHG | TEMPERATURE: 98 F | SYSTOLIC BLOOD PRESSURE: 100 MMHG | HEIGHT: 70 IN | OXYGEN SATURATION: 97 %

## 2020-02-26 PROBLEM — M54.9 UPPER BACK PAIN ON LEFT SIDE: Status: ACTIVE | Noted: 2020-02-26

## 2020-02-26 PROBLEM — R00.0 TACHYCARDIA: Status: ACTIVE | Noted: 2020-02-26

## 2020-02-26 PROCEDURE — G8482 FLU IMMUNIZE ORDER/ADMIN: HCPCS | Performed by: INTERNAL MEDICINE

## 2020-02-26 PROCEDURE — 4004F PT TOBACCO SCREEN RCVD TLK: CPT | Performed by: INTERNAL MEDICINE

## 2020-02-26 PROCEDURE — 99213 OFFICE O/P EST LOW 20 MIN: CPT | Performed by: INTERNAL MEDICINE

## 2020-02-26 PROCEDURE — G8427 DOCREV CUR MEDS BY ELIG CLIN: HCPCS | Performed by: INTERNAL MEDICINE

## 2020-02-26 PROCEDURE — G8417 CALC BMI ABV UP PARAM F/U: HCPCS | Performed by: INTERNAL MEDICINE

## 2020-02-26 PROCEDURE — 96372 THER/PROPH/DIAG INJ SC/IM: CPT | Performed by: INTERNAL MEDICINE

## 2020-02-26 PROCEDURE — 3017F COLORECTAL CA SCREEN DOC REV: CPT | Performed by: INTERNAL MEDICINE

## 2020-02-26 RX ORDER — KETOROLAC TROMETHAMINE 30 MG/ML
30 INJECTION, SOLUTION INTRAMUSCULAR; INTRAVENOUS ONCE
Status: COMPLETED | OUTPATIENT
Start: 2020-02-26 | End: 2020-02-26

## 2020-02-26 RX ORDER — CYCLOBENZAPRINE HCL 10 MG
10 TABLET ORAL NIGHTLY PRN
Qty: 10 TABLET | Refills: 0 | Status: SHIPPED | OUTPATIENT
Start: 2020-02-26 | End: 2020-03-07

## 2020-02-26 RX ORDER — METHYLPREDNISOLONE 4 MG/1
TABLET ORAL
Qty: 1 KIT | Refills: 0 | Status: SHIPPED | OUTPATIENT
Start: 2020-02-26 | End: 2020-03-03

## 2020-02-26 RX ADMIN — KETOROLAC TROMETHAMINE 30 MG: 30 INJECTION, SOLUTION INTRAMUSCULAR; INTRAVENOUS at 15:03

## 2020-02-26 ASSESSMENT — ENCOUNTER SYMPTOMS
CHEST TIGHTNESS: 0
VOMITING: 0
CONSTIPATION: 0
ABDOMINAL PAIN: 0
EYE PAIN: 0
SHORTNESS OF BREATH: 0
SORE THROAT: 0
DIARRHEA: 0
BACK PAIN: 1
COUGH: 0
RHINORRHEA: 0
NAUSEA: 0
BLOOD IN STOOL: 0

## 2020-02-26 ASSESSMENT — PATIENT HEALTH QUESTIONNAIRE - PHQ9
SUM OF ALL RESPONSES TO PHQ QUESTIONS 1-9: 0
1. LITTLE INTEREST OR PLEASURE IN DOING THINGS: 0
SUM OF ALL RESPONSES TO PHQ9 QUESTIONS 1 & 2: 0
SUM OF ALL RESPONSES TO PHQ QUESTIONS 1-9: 0
2. FEELING DOWN, DEPRESSED OR HOPELESS: 0

## 2020-02-26 NOTE — PROGRESS NOTES
side effects.    - [facility] walks 8-10 miles per day at work. Health Maintenance   - immunizations:   Influenza Vaccination - (11/12/2018), (2019)   Pneumonia Vaccination - declines   Shingles vaccine (shingrix) - declines  Tetanus Vaccination - (2009)     - Screenings:   Colonoscopy  - (12/2017) - normal - follow up 10yrs  Prostate     Couseled on Home Safety     ROS:  Review of Systems   Constitutional: Negative for appetite change, chills, fever and unexpected weight change. HENT: Negative for congestion, rhinorrhea and sore throat. Eyes: Negative for pain and visual disturbance. Respiratory: Negative for cough, chest tightness and shortness of breath. Cardiovascular: Negative for chest pain and palpitations. Gastrointestinal: Negative for abdominal pain, blood in stool, constipation, diarrhea, nausea and vomiting. Genitourinary: Negative for difficulty urinating, dysuria, hematuria, scrotal swelling, testicular pain and urgency. Musculoskeletal: Positive for arthralgias and back pain. Negative for gait problem. Skin: Negative for rash. Neurological: Negative for dizziness, syncope, weakness, light-headedness and headaches. Hematological: Negative for adenopathy. Does not bruise/bleed easily. Psychiatric/Behavioral: Negative for suicidal ideas. The patient is not nervous/anxious.           Current Outpatient Medications:     methylPREDNISolone (MEDROL DOSEPACK) 4 MG tablet, Take by mouth., Disp: 1 kit, Rfl: 0    cyclobenzaprine (FLEXERIL) 10 MG tablet, Take 1 tablet by mouth nightly as needed for Muscle spasms, Disp: 10 tablet, Rfl: 0    montelukast (SINGULAIR) 10 MG tablet, Take 1 tablet by mouth daily, Disp: 90 tablet, Rfl: 1    lisinopril (PRINIVIL;ZESTRIL) 20 MG tablet, Take 1 tablet by mouth 2 times daily, Disp: 180 tablet, Rfl: 1    albuterol sulfate  (90 Base) MCG/ACT inhaler, Inhale 2 puffs into the lungs 4 times daily as needed for Wheezing Every 4-6 hours as needed, Disp: 1 Inhaler, Rfl: 5    atorvastatin (LIPITOR) 40 MG tablet, Take 40 mg by mouth daily, Disp: , Rfl:     insulin glargine (BASAGLAR KWIKPEN) 100 UNIT/ML injection pen, Inject 21 Units into the skin every morning , Disp: , Rfl:     FARXIGA 10 MG tablet, Take 1 tablet by mouth daily, Disp: , Rfl: 1    TRULICITY 1.5 AI/6.9TZ SOPN, 1.5 mg by Subcutaneous Infusion route every 7 days, Disp: , Rfl: 1    metFORMIN (GLUCOPHAGE) 850 MG tablet, Take 850 mg by mouth 3 times daily , Disp: , Rfl:     glipiZIDE (GLUCOTROL) 5 MG tablet, Take 20 mg by mouth Taking 4 tabs q am, Disp: , Rfl:     Allergies   Allergen Reactions    No Known Allergies        Past Medical History:   Diagnosis Date    Abdominal pain     Ankle arthralgia     Depression     Diarrhea     Diffuse cervicobrachial syndrome     Hyperlipidemia     Hypertension     Low back pain     Neck pain     Neck sprain     Psychosexual dysfunction associated with inhibited libido     Spasm     Type 2 diabetes mellitus without complication [facility])        Past Surgical History:   Procedure Laterality Date    COLONOSCOPY      12/8/2017 normal     SHOULDER ARTHROSCOPY Right     VASECTOMY  04/2017       No family history on file. Social History     Socioeconomic History    Marital status:      Spouse name: Cristian  Number of children: 2    Years of education: 13    Highest education level: Some college, no degree   Occupational History    Occupation:    Social Needs    Financial resource strain: Not very hard    Food insecurity:     Worry: Never true     Inability: Never true    Transportation needs:     Medical: No     Non-medical: No   Tobacco Use    Smoking status: Current Some Day Smoker    Smokeless tobacco: Former User     Types: Chew    Tobacco comment: 5-6 cigarettes qd, ocassional cigar   Substance and Sexual Activity    Alcohol use:  Yes     Alcohol/week: 4.0 standard drinks     Types: 4 Cans of beer per week    Drug use: No    Sexual activity: Not on file   Lifestyle    Physical activity:     Days per week: Not on file     Minutes per session: Not on file    Stress: Not on file   Relationships    Social connections:     Talks on phone: Not on file     Gets together: Not on file     Attends Nondenominational service: Not on file     Active member of club or organization: Not on file     Attends meetings of clubs or organizations: Not on file     Relationship status: Not on file    Intimate partner violence:     Fear of current or ex partner: Not on file     Emotionally abused: Not on file     Physically abused: Not on file     Forced sexual activity: Not on file   Other Topics Concern    Not on file   Social History Narrative    Not on file       Vitals:    02/26/20 1435   BP: 100/60   Site: Left Upper Arm   Cuff Size: Large Adult   Pulse: 114   Temp: 98 °F (36.7 °C)   SpO2: 97%   Weight: 254 lb (115.2 kg)   Height: 5' 10\" (1.778 m)       Exam:  Physical Exam  Constitutional:       Appearance: He is well-developed. HENT:      Head: Normocephalic and atraumatic. Right Ear: External ear normal.      Left Ear: External ear normal.   Eyes:      Pupils: Pupils are equal, round, and reactive to light. Neck:      Musculoskeletal: Normal range of motion and neck supple. Thyroid: No thyromegaly. Cardiovascular:      Rate and Rhythm: Normal rate and regular rhythm. Heart sounds: Normal heart sounds. No murmur. Pulmonary:      Effort: Pulmonary effort is normal.      Breath sounds: Normal breath sounds. No wheezing or rales. Abdominal:      General: Bowel sounds are normal.      Palpations: Abdomen is soft. Tenderness: There is no abdominal tenderness. There is no guarding or rebound. Musculoskeletal: Normal range of motion. Lymphadenopathy:      Cervical: No cervical adenopathy. Skin:     General: Skin is warm and dry.    Neurological:      Mental Status: He is alert and oriented to person, place, PM

## 2020-03-09 ENCOUNTER — OFFICE VISIT (OUTPATIENT)
Dept: CHIROPRACTIC MEDICINE | Age: 55
End: 2020-03-09
Payer: COMMERCIAL

## 2020-03-09 VITALS
TEMPERATURE: 97.3 F | DIASTOLIC BLOOD PRESSURE: 76 MMHG | OXYGEN SATURATION: 95 % | SYSTOLIC BLOOD PRESSURE: 118 MMHG | HEART RATE: 120 BPM

## 2020-03-09 PROCEDURE — 98940 CHIROPRACT MANJ 1-2 REGIONS: CPT | Performed by: CHIROPRACTOR

## 2020-03-09 PROCEDURE — 99212 OFFICE O/P EST SF 10 MIN: CPT | Performed by: CHIROPRACTOR

## 2020-03-09 PROCEDURE — G8417 CALC BMI ABV UP PARAM F/U: HCPCS | Performed by: CHIROPRACTOR

## 2020-03-09 PROCEDURE — G8428 CUR MEDS NOT DOCUMENT: HCPCS | Performed by: CHIROPRACTOR

## 2020-03-09 PROCEDURE — G8482 FLU IMMUNIZE ORDER/ADMIN: HCPCS | Performed by: CHIROPRACTOR

## 2020-03-09 PROCEDURE — 3017F COLORECTAL CA SCREEN DOC REV: CPT | Performed by: CHIROPRACTOR

## 2020-03-09 PROCEDURE — 97014 ELECTRIC STIMULATION THERAPY: CPT | Performed by: CHIROPRACTOR

## 2020-03-09 PROCEDURE — 4004F PT TOBACCO SCREEN RCVD TLK: CPT | Performed by: CHIROPRACTOR

## 2020-03-09 NOTE — PROGRESS NOTES
3/9/20  Lisa Davis : 1965 Sex: male  Age: 49 y.o. Chief Complaint   Patient presents with    Back Pain       HPI:   Back Pain  He states that about a week ago, he slipped and fell on some ice, landing on his left elbow. He feels he jammed the ribs of his left side of his chest.  He states he did see his PCP and was placed on some medications. Pain has persisted despite. He has no shortness of breath or difficulty breathing. Pain does not radiate around the trunk at all. He states it is near the left side mid back below the shoulder blade. No radiation of symptoms to the extremities. No weakness, numbness or tingling. He has been working without modifications. He states he does have some increased difficulty at times however due to his back pain. Current Outpatient Medications:     montelukast (SINGULAIR) 10 MG tablet, Take 1 tablet by mouth daily, Disp: 90 tablet, Rfl: 1    lisinopril (PRINIVIL;ZESTRIL) 20 MG tablet, Take 1 tablet by mouth 2 times daily, Disp: 180 tablet, Rfl: 1    albuterol sulfate  (90 Base) MCG/ACT inhaler, Inhale 2 puffs into the lungs 4 times daily as needed for Wheezing Every 4-6 hours as needed, Disp: 1 Inhaler, Rfl: 5    atorvastatin (LIPITOR) 40 MG tablet, Take 40 mg by mouth daily, Disp: , Rfl:     insulin glargine (BASAGLAR KWIKPEN) 100 UNIT/ML injection pen, Inject 21 Units into the skin every morning , Disp: , Rfl:     FARXIGA 10 MG tablet, Take 1 tablet by mouth daily, Disp: , Rfl: 1    TRULICITY 1.5 Hungarian/1.6QB SOPN, 1.5 mg by Subcutaneous Infusion route every 7 days, Disp: , Rfl: 1    metFORMIN (GLUCOPHAGE) 850 MG tablet, Take 850 mg by mouth 3 times daily , Disp: , Rfl:     glipiZIDE (GLUCOTROL) 5 MG tablet, Take 20 mg by mouth Taking 4 tabs q am, Disp: , Rfl:     Exam:   Vitals:    20 1457   BP: 118/76   Pulse: 120   Temp: 97.3 °F (36.3 °C)   SpO2: 95%     He appears well. No apparent distress. Alert and oriented x3.

## 2020-03-09 NOTE — LETTER
[address]  [facility] [place] [address]  Phone: [phone]  Fax: [phone]        March 9, 2020     Patient: Juan Carlos Tovar   YOB: 1965   Date of Visit: 3/9/2020       To Whom it May Concern:    Juan Carlos Tovar was seen in my clinic on 3/9/2020. He may return to work on 3/10/2020. If you have any questions or concerns, please don't hesitate to call.     Sincerely,         Lisa Keys DC

## 2020-03-12 ENCOUNTER — OFFICE VISIT (OUTPATIENT)
Dept: CHIROPRACTIC MEDICINE | Age: 55
End: 2020-03-12
Payer: COMMERCIAL

## 2020-03-12 PROCEDURE — 97014 ELECTRIC STIMULATION THERAPY: CPT | Performed by: CHIROPRACTOR

## 2020-03-12 PROCEDURE — 98940 CHIROPRACT MANJ 1-2 REGIONS: CPT | Performed by: CHIROPRACTOR

## 2020-03-17 ENCOUNTER — TELEPHONE (OUTPATIENT)
Dept: ADMINISTRATIVE | Age: 55
End: 2020-03-17

## 2020-03-17 NOTE — TELEPHONE ENCOUNTER
Is he having any other symptoms such as fever chills muscle and joint aches, short of breath    Given the situation with coronavirus were difficult to know exactly what were dealing with    We are trying not to expose Others to illness    Current federal and state guidelines, patients with mild  symptoms were suggested to self quarantine for 14 days    I do not know what we are dealing with whether it is normal rhinovirus versus other virus.      If the patient is having some of the other symptoms listed in the first sentence would need to be evaluated

## 2020-03-17 NOTE — TELEPHONE ENCOUNTER
Pt called for an appt with Dr. Basim Zhang. He called off work today due to chest and head congestion, and he is not allowed to return to work until he is cleared by a physician. Please contact pt with further instructions.

## 2020-05-08 ENCOUNTER — VIRTUAL VISIT (OUTPATIENT)
Dept: PRIMARY CARE CLINIC | Age: 55
End: 2020-05-08
Payer: COMMERCIAL

## 2020-05-08 VITALS — WEIGHT: 253 LBS | BODY MASS INDEX: 36.3 KG/M2

## 2020-05-08 PROBLEM — S93.401A RIGHT ANKLE SPRAIN: Status: ACTIVE | Noted: 2020-05-08

## 2020-05-08 PROCEDURE — G8427 DOCREV CUR MEDS BY ELIG CLIN: HCPCS | Performed by: INTERNAL MEDICINE

## 2020-05-08 PROCEDURE — 3017F COLORECTAL CA SCREEN DOC REV: CPT | Performed by: INTERNAL MEDICINE

## 2020-05-08 PROCEDURE — 99213 OFFICE O/P EST LOW 20 MIN: CPT | Performed by: INTERNAL MEDICINE

## 2020-05-08 ASSESSMENT — ENCOUNTER SYMPTOMS
EYE PAIN: 0
SORE THROAT: 0
SHORTNESS OF BREATH: 0
COUGH: 0
BLOOD IN STOOL: 0
ABDOMINAL PAIN: 0
NAUSEA: 0
CONSTIPATION: 0
VOMITING: 0
RHINORRHEA: 0
DIARRHEA: 0
CHEST TIGHTNESS: 0

## 2020-05-08 NOTE — PATIENT INSTRUCTIONS
Patient Education        Ankle: Exercises  Introduction  Here are some examples of exercises for you to try. The exercises may be suggested for a condition or for rehabilitation. Start each exercise slowly. Ease off the exercises if you start to have pain. You will be told when to start these exercises and which ones will work best for you. Alphabet exercise  \"Alphabet\" exercise   1. Trace the alphabet with your toe. This helps your ankle move in all directions. Side-to-side knee swing exercise   1. Sit in a chair with your foot flat on the floor. 2. Slowly move your knee from side to side while keeping your foot pressed flat. 3. Continue this exercise for 2 to 3 minutes. Towel curl   1. While sitting, place your foot on a towel on the floor and scrunch the towel toward you with your toes. 2. Then use your toes to push the towel away from you. 3. Make this exercise more challenging by placing a weighted object, such as a soup can, on the other end of the towel. Towel stretch   1. Sit with your legs extended and knees straight. 2. Place a towel around your foot just under the toes. 3. Hold each end of the towel in each hand, with your hands above your knees. 4. Pull back with the towel so that your foot stretches toward you. 5. Hold the position for at least 15 to 30 seconds. 6. Repeat 2 to 4 times a session, up to 5 sessions a day. Ankle eversion exercise   1. Start by sitting with your foot flat on the floor and pushing it outward against an immovable object such as the wall or heavy furniture. Hold for about 6 seconds, then relax. Repeat 8 to 12 times. 2. After you feel comfortable with this, try using rubber tubing looped around the outside of your feet for resistance. Push your foot out to the side against the tubing, and then count to 10 as you slowly bring your foot back to the middle. Repeat 8 to 12 times. Isometric opposition exercises   1.  While sitting, put your feet together flat on the floor. 2. Press your injured foot inward against your other foot. Hold for about 6 seconds, and relax. Repeat 8 to 12 times. 3. Then place the heel of your other foot on top of the injured one. Push down with the top heel while trying to push up with your injured foot. Hold for about 6 seconds, and relax. Repeat 8 to 12 times. Follow-up care is a key part of your treatment and safety. Be sure to make and go to all appointments, and call your doctor if you are having problems. It's also a good idea to know your test results and keep a list of the medicines you take. Where can you learn more? Go to https://e2e MaterialspePlayLab.[employer].com. org and sign in to your [employer] account. Enter D366 in the [employer] box to learn more about \"Ankle: Exercises. \"     If you do not have an account, please click on the \"Sign Up Now\" link. Current as of: June 26, 2019Content Version: 12.4  © 7832-2532 Healthwise, [address] [employer]. Care instructions adapted under license by [facility] ([facility]). If you have questions about a medical condition or this instruction, always ask your healthcare professional. [place] [address] any warranty or liability for your use of this information. Patient Education        Ankle Sprain: Care Instructions  Your Care Instructions    An ankle sprain can happen when you twist your ankle. The ligaments that support the ankle can get stretched and torn. Often the ankle is swollen and painful. Ankle sprains may take from several weeks to several months to heal. Usually, the more pain and swelling you have, the more severe your ankle sprain is and the longer it will take to heal. You can heal faster and regain strength in your ankle with good home treatment. It is very important to give your ankle time to heal completely, so that you do not easily hurt your ankle again. Follow-up care is a key part of your treatment and safety.  Be sure to make and go to all doctor if:    · You are not getting better after 1 week. Where can you learn more? Go to https://chpepiceweb.[employer] org and sign in to your [employer] account. Enter F129 in the [facility] box to learn more about \"Ankle Sprain: Care Instructions. \"     If you do not have an account, please click on the \"Sign Up Now\" link. Current as of: June 26, 2019Content Version: 12.4  © 9429-7862 Healthwise, Incorporated. Care instructions adapted under license by [facility] ([facility]). If you have questions about a medical condition or this instruction, always ask your healthcare professional. [place] [address] any warranty or liability for your use of this information. Patient Education        Ankle Sprain: Rehab Exercises  Introduction  Here are some examples of exercises for you to try. The exercises may be suggested for a condition or for rehabilitation. Start each exercise slowly. Ease off the exercises if you start to have pain. You will be told when to start these exercises and which ones will work best for you. How to do the exercises  \"Alphabet\" exercise   1. Trace the alphabet with your toe. This helps your ankle move in all directions. Side-to-side knee swing exercise   1. Sit in a chair with your foot flat on the floor. 2. Slowly move your knee from side to side. Keep your foot pressed flat. 3. Continue this exercise for 2 to 3 minutes. Towel curl   1. While sitting, place your foot on a towel on the floor. Scrunch the towel toward you with your toes. 2. Then use your toes to push the towel away from you. 3. To make this exercise more challenging you can put something on the other end of the towel. A can of soup is about the right weight for this. Towel stretch   1. Sit with your legs extended and knees straight. 2. Place a towel around your foot just under the toes. 3. Hold each end of the towel in each hand, with your hands above your knees.   4. Pull back with the towel so that your foot stretches toward you. 5. Hold the position for at least 15 to 30 seconds. 6. Repeat 2 to 4 times a session. Do up to 5 sessions a day. Ankle eversion exercise   1. Start by sitting with your foot flat on the floor. Push your foot outward against a wall or a piece of furniture that doesn't move. Hold for about 6 seconds, and relax. Repeat 8 to 12 times. 2. After you feel comfortable with this, try using rubber tubing looped around the outside of your feet for resistance. Push your foot out to the side against the tubing, and then count to 10 as you slowly bring your foot back to the middle. Repeat 8 to 12 times. Isometric opposition exercises   1. While sitting, put your feet together flat on the floor. 2. Press your injured foot inward against your other foot. Hold for about 6 seconds, and relax. Repeat 8 to 12 times. 3. Then place the heel of your other foot on top of the injured one. Push down with the top heel while trying to push up with your injured foot. Hold for about 6 seconds, and relax. Repeat 8 to 12 times. Resisted ankle inversion   1. Sit on the floor with your good leg crossed over your other leg. 2. Hold both ends of an exercise band and loop the band around the inside of your affected foot. Then press your other foot against the band. 3. Keeping your legs crossed, slowly push your affected foot against the band so that foot moves away from your other foot. Then slowly relax. 4. Repeat 8 to 12 times. Resisted ankle eversion   1. Sit on the floor with your legs straight. 2. Hold both ends of an exercise band and loop the band around the outside of your affected foot. Then press your other foot against the band. 3. Keeping your leg straight, slowly push your affected foot outward against the band and away from your other foot without letting your leg rotate. Then slowly relax. 4. Repeat 8 to 12 times. Resisted ankle dorsiflexion   1.  Tie the medical condition or this instruction, always ask your healthcare professional. [place] [address] any warranty or liability for your use of this information.

## 2020-05-08 NOTE — PROGRESS NOTES
Social History     Tobacco Use    Smoking status: Current Some Day Smoker    Smokeless tobacco: Former User     Types: Chew    Tobacco comment: 5-6 cigarettes qd, ocassional cigar   Substance Use Topics    Alcohol use:  Yes     Alcohol/week: 4.0 standard drinks     Types: 4 Cans of beer per week    Drug use: No        Allergies   Allergen Reactions    No Known Allergies    ,   Past Medical History:   Diagnosis Date    Abdominal pain     Ankle arthralgia     Depression     Diarrhea     Diffuse cervicobrachial syndrome     Hyperlipidemia     Hypertension     Low back pain     Neck pain     Neck sprain     Psychosexual dysfunction associated with inhibited libido     Spasm     Type 2 diabetes mellitus without complication (HCC)    ,   Past Surgical History:   Procedure Laterality Date    COLONOSCOPY      12/8/2017 normal     SHOULDER ARTHROSCOPY Right     VASECTOMY  04/2017   , No family history on file.,   Immunization History   Administered Date(s) Administered    Hepatitis B 10/15/2010, 11/14/2010, 05/09/2011    Hepatitis B Adult (Recombivax HB) 10/15/2010, 11/14/2010, 05/09/2011    Influenza Vaccine, unspecified formulation 09/24/2012, 10/25/2013, 11/06/2014, 11/07/2014, 10/30/2015, 11/12/2018    Influenza Virus Vaccine 09/24/2012, 10/25/2013, 11/06/2014, 11/07/2014, 10/30/2015    Influenza, Quadv, IM, PF (6 mo and older Fluzone, Flulaval, Fluarix, and 3 yrs and older Afluria) 11/12/2018, 12/18/2019    Tdap (Boostrix, Adacel) 06/01/2009   ,   Health Maintenance   Topic Date Due    Pneumococcal 0-64 years Vaccine (1 of 1 - PPSV23) 09/07/1972    Diabetic foot exam  09/07/1976    Diabetic retinal exam  09/07/1976    HIV screen  09/07/1981    Shingles Vaccine (1 of 2) 09/07/2016    DTaP/Tdap/Td vaccine (2 - Td) 06/01/2019    Diabetic microalbuminuria test  12/18/2020    Lipid screen  12/18/2020    Potassium monitoring  12/18/2020    Creatinine monitoring  12/18/2020    A1C

## 2020-06-11 ENCOUNTER — OFFICE VISIT (OUTPATIENT)
Dept: FAMILY MEDICINE CLINIC | Age: 55
End: 2020-06-11
Payer: COMMERCIAL

## 2020-06-11 VITALS
TEMPERATURE: 97.6 F | SYSTOLIC BLOOD PRESSURE: 104 MMHG | DIASTOLIC BLOOD PRESSURE: 60 MMHG | HEIGHT: 70 IN | OXYGEN SATURATION: 97 % | BODY MASS INDEX: 36.22 KG/M2 | WEIGHT: 253 LBS | HEART RATE: 106 BPM

## 2020-06-11 PROCEDURE — 99213 OFFICE O/P EST LOW 20 MIN: CPT | Performed by: INTERNAL MEDICINE

## 2020-06-11 PROCEDURE — 2022F DILAT RTA XM EVC RTNOPTHY: CPT | Performed by: INTERNAL MEDICINE

## 2020-06-11 PROCEDURE — 3052F HG A1C>EQUAL 8.0%<EQUAL 9.0%: CPT | Performed by: INTERNAL MEDICINE

## 2020-06-11 PROCEDURE — 4004F PT TOBACCO SCREEN RCVD TLK: CPT | Performed by: INTERNAL MEDICINE

## 2020-06-11 PROCEDURE — 3017F COLORECTAL CA SCREEN DOC REV: CPT | Performed by: INTERNAL MEDICINE

## 2020-06-11 PROCEDURE — G8417 CALC BMI ABV UP PARAM F/U: HCPCS | Performed by: INTERNAL MEDICINE

## 2020-06-11 PROCEDURE — G8427 DOCREV CUR MEDS BY ELIG CLIN: HCPCS | Performed by: INTERNAL MEDICINE

## 2020-06-11 RX ORDER — MONTELUKAST SODIUM 10 MG/1
10 TABLET ORAL DAILY
Qty: 90 TABLET | Refills: 1 | Status: SHIPPED
Start: 2020-06-11 | End: 2020-08-25

## 2020-06-11 RX ORDER — LISINOPRIL 20 MG/1
20 TABLET ORAL 2 TIMES DAILY
Qty: 180 TABLET | Refills: 1 | Status: SHIPPED
Start: 2020-06-11 | End: 2020-12-18 | Stop reason: SDUPTHER

## 2020-06-11 RX ORDER — ALBUTEROL SULFATE 90 UG/1
2 AEROSOL, METERED RESPIRATORY (INHALATION) 4 TIMES DAILY PRN
Qty: 1 INHALER | Refills: 5 | Status: SHIPPED
Start: 2020-06-11 | End: 2022-02-22 | Stop reason: CLARIF

## 2020-06-11 ASSESSMENT — ENCOUNTER SYMPTOMS
DIARRHEA: 0
CONSTIPATION: 0
RHINORRHEA: 0
EYE PAIN: 0
COUGH: 0
SORE THROAT: 0
ABDOMINAL PAIN: 0
BACK PAIN: 1
NAUSEA: 0
BLOOD IN STOOL: 0
VOMITING: 0
CHEST TIGHTNESS: 0
SHORTNESS OF BREATH: 0

## 2020-06-11 NOTE — PROGRESS NOTES
[facility]) Physicians   Internal Medicine     2020  Immanuel Oreilly : 1965 Sex: male  Age:54 y.o. Chief Complaint   Patient presents with    6 Month Follow-Up    Diabetes        HPI:   Patient presents to office for review and management of chronic medical conditions.    - States right ankle pain has improved. chunk of rock fell on top of right foot. States next day rolled ankle on log while walking through the woods on the right side as well. States swelling - improved. States ecchymosis. States a lump. - States low back pain has improved. had a fall on ice.     - States having right posterior shoulder pain. Improved. States some tingling in arms. States comes and goes. Declines evaluation.     - States still has nasal congestion Some rhinorrhea. No ear pain. No facial pressure. No fever of chills. No chest pain, SOB. States cough. States taking allavert. Has ProAir inhaler if needed, uses sparingly.    - States still having bilateral knee pain. Stable. States has seen ortho and had injection to left knee. No erythema. No buckling. No locking. Worse with ambulation. No hip pain. no back pain. Taking OTC supplement (aleve). No follow up. - States erectile issues are stable. States having difficulty maintaining erection. Stable. - States has had follow up with endocrinology. Basaglar 24 units and glipizide 5mg 4 tabs am and metformin tid. On Farxiga. On trulicity. States some hypoglycemia. States has had eye exam and podiatry exam. States sugar running 120-140. All < 200. States walking alot. States last A1c was 8.3 per Patient (2020)    - States has high cholesterol. States was placed on Lipitor by endocrinology. States watches diet. - States high blood pressure. States does not check blood pressure at home. On lisinopril. No reported side effects.    - States walks 8-10 miles per day at work. - States has chronic allergues. On Nasacort. On allovert.  On singulair, Has seen allergy in the past.    Health Maintenance   - immunizations:   Influenza Vaccination - (11/12/2018), (2019)   Pneumonia Vaccination - declines   Shingles vaccine (shingrix) - declines  Tetanus Vaccination - (2009)     - Screenings:   Colonoscopy  - (12/2017) - normal - follow up 10yrs  Prostate     Couseled on Home Safety     ROS:  Review of Systems   Constitutional: Negative for appetite change, chills, fever and unexpected weight change. HENT: Negative for congestion, rhinorrhea and sore throat. Eyes: Negative for pain and visual disturbance. Respiratory: Negative for cough, chest tightness and shortness of breath. Cardiovascular: Negative for chest pain and palpitations. Gastrointestinal: Negative for abdominal pain, blood in stool, constipation, diarrhea, nausea and vomiting. Genitourinary: Negative for difficulty urinating, dysuria, hematuria, scrotal swelling, testicular pain and urgency. Musculoskeletal: Positive for arthralgias and back pain. Negative for gait problem. Skin: Negative for rash. Neurological: Negative for dizziness, syncope, weakness, light-headedness and headaches. Hematological: Negative for adenopathy. Does not bruise/bleed easily. Psychiatric/Behavioral: Negative for suicidal ideas. The patient is not nervous/anxious.           Current Outpatient Medications:     montelukast (SINGULAIR) 10 MG tablet, Take 1 tablet by mouth daily, Disp: 90 tablet, Rfl: 1    lisinopril (PRINIVIL;ZESTRIL) 20 MG tablet, Take 1 tablet by mouth 2 times daily, Disp: 180 tablet, Rfl: 1    albuterol sulfate  (90 Base) MCG/ACT inhaler, Inhale 2 puffs into the lungs 4 times daily as needed for Wheezing Every 4-6 hours as needed, Disp: 1 Inhaler, Rfl: 5    atorvastatin (LIPITOR) 40 MG tablet, Take 40 mg by mouth daily, Disp: , Rfl:     insulin glargine (BASAGLAR KWIKPEN) 100 UNIT/ML injection pen, Inject 21 Units into the skin every morning , Disp: , Rfl:     FARXIGA 10 MG tablet, Take 1 tablet by mouth daily, Disp: , Rfl: 1    TRULICITY 1.5 PG/5.9CJ SOPN, 1.5 mg by Subcutaneous Infusion route every 7 days, Disp: , Rfl: 1    metFORMIN (GLUCOPHAGE) 850 MG tablet, Take 850 mg by mouth 3 times daily , Disp: , Rfl:     glipiZIDE (GLUCOTROL) 5 MG tablet, Take 20 mg by mouth Taking 4 tabs q am, Disp: , Rfl:     Allergies   Allergen Reactions    No Known Allergies        Past Medical History:   Diagnosis Date    Abdominal pain     Ankle arthralgia     Depression     Diarrhea     Diffuse cervicobrachial syndrome     Hyperlipidemia     Hypertension     Low back pain     Neck pain     Neck sprain     Psychosexual dysfunction associated with inhibited libido     Spasm     Type 2 diabetes mellitus without complication [facility])        Past Surgical History:   Procedure Laterality Date    COLONOSCOPY      12/8/2017 normal     SHOULDER ARTHROSCOPY Right     VASECTOMY  04/2017       No family history on file. Social History     Socioeconomic History    Marital status:      Spouse name: Roger Rizzo Number of children: 2    Years of education: 13    Highest education level: Some college, no degree   Occupational History    Occupation:    Social Needs    Financial resource strain: Not very hard    Food insecurity     Worry: Never true     Inability: Never true    Transportation needs     Medical: No     Non-medical: No   Tobacco Use    Smoking status: Current Some Day Smoker    Smokeless tobacco: Former User     Types: Chew    Tobacco comment: 5-6 cigarettes qd, ocassional cigar   Substance and Sexual Activity    Alcohol use:  Yes     Alcohol/week: 4.0 standard drinks     Types: 4 Cans of beer per week    Drug use: No    Sexual activity: Not on file   Lifestyle    Physical activity     Days per week: Not on file     Minutes per session: Not on file    Stress: Not on file   Relationships    Social connections     Talks on phone: Not on file     Gets together: Not

## 2020-07-23 LAB
AVERAGE GLUCOSE: NORMAL
HBA1C MFR BLD: 8.4 %

## 2020-08-25 ENCOUNTER — VIRTUAL VISIT (OUTPATIENT)
Dept: FAMILY MEDICINE CLINIC | Age: 55
End: 2020-08-25
Payer: COMMERCIAL

## 2020-08-25 PROCEDURE — 99213 OFFICE O/P EST LOW 20 MIN: CPT | Performed by: INTERNAL MEDICINE

## 2020-08-25 PROCEDURE — G8427 DOCREV CUR MEDS BY ELIG CLIN: HCPCS | Performed by: INTERNAL MEDICINE

## 2020-08-25 PROCEDURE — 3017F COLORECTAL CA SCREEN DOC REV: CPT | Performed by: INTERNAL MEDICINE

## 2020-08-25 RX ORDER — METHYLPREDNISOLONE 4 MG/1
TABLET ORAL
Qty: 1 KIT | Refills: 0 | Status: SHIPPED | OUTPATIENT
Start: 2020-08-25 | End: 2020-08-31

## 2020-08-25 ASSESSMENT — ENCOUNTER SYMPTOMS
BLOOD IN STOOL: 0
COUGH: 0
VOMITING: 0
SORE THROAT: 0
DIARRHEA: 0
EYE PAIN: 0
CONSTIPATION: 0
CHEST TIGHTNESS: 0
ABDOMINAL PAIN: 0
NAUSEA: 0
SHORTNESS OF BREATH: 0
RHINORRHEA: 0

## 2020-08-25 NOTE — PROGRESS NOTES
TeleMedicine Video Visit    This visit was performed as a virtual video visit using a synchronous, two-way, audio-video telehealth technology platform. Patient identification was verified at the start of the visit, including the patient's telephone number and physical location. I discussed with the patient the nature of our telehealth visits, that:     1. Due to the nature of an audio- video modality, the only components of a physical exam that could be done are the elements supported by direct observation. 2. I would evaluate the patient and recommend diagnostics and treatments based on my assessment. 3. If it was felt that the patient should be evaluated in clinic or an emergency room setting, then they would be directed there. 4. Our sessions are not being recorded and that personal health information is protected. 5. Our team would provide follow up care in person if/when the patient needs it. Patient does agree to proceed with telemedicine consultation. Patient's location: home address in [facility]  Physician  location office  other people involved in call - none      Time spent: Greater than Not billed by time    This visit was completed virtually using Doxy. me        2020    TELEHEALTH EVALUATION -- Audio/Visual (During HGZKY-98 public health emergency)    HPI:    Sandro Lopez (:  1965) has requested an audio/video evaluation for the following concern(s):     has started a new job.  requires him to be on knees all day and gets up and down frequently. States right > left. States swelling. No erythema. States feesl still and tight. No truama or injury. [facility] has been using ice, espsom salt oak and elevating. [facility] has been taking naproxen as normal.  States some improvement but still and issues. No fever or chills. States has been using knee sleeve. Review of Systems   Constitutional: Negative for appetite change, chills, fever and unexpected weight change.    HENT: Negative for congestion, rhinorrhea and sore throat. Eyes: Negative for pain and visual disturbance. Respiratory: Negative for cough, chest tightness and shortness of breath. Cardiovascular: Negative for chest pain and palpitations. Gastrointestinal: Negative for abdominal pain, blood in stool, constipation, diarrhea, nausea and vomiting. Genitourinary: Negative for difficulty urinating, dysuria, hematuria, scrotal swelling, testicular pain and urgency. Musculoskeletal: Positive for arthralgias. Negative for gait problem. Skin: Negative for rash. Neurological: Negative for dizziness, syncope, weakness, light-headedness and headaches. Hematological: Negative for adenopathy. Does not bruise/bleed easily. Psychiatric/Behavioral: Negative for suicidal ideas. The patient is not nervous/anxious. Prior to Visit Medications    Medication Sig Taking? Authorizing Provider   NAPROXEN SODIUM PO Take by mouth as needed (knee pain) Yes Historical Provider, MD   methylPREDNISolone (MEDROL DOSEPACK) 4 MG tablet Take by mouth.  Yes Christiano Vazquez MD   lisinopril (PRINIVIL;ZESTRIL) 20 MG tablet Take 1 tablet by mouth 2 times daily Yes Christiano Vazquez MD   albuterol sulfate  (90 Base) MCG/ACT inhaler Inhale 2 puffs into the lungs 4 times daily as needed for Wheezing Every 4-6 hours as needed Yes Christiano Vazquez MD   atorvastatin (LIPITOR) 40 MG tablet Take 40 mg by mouth daily Yes Historical Provider, MD   insulin glargine (BASAGLAR KWIKPEN) 100 UNIT/ML injection pen Inject 27 Units into the skin every morning  Yes Historical Provider, MD   FARXIGA 10 MG tablet Take 1 tablet by mouth daily Yes Historical Provider, MD DE LEÓNITY 1.5 TG/1.5DW SOPN 1.5 mg by Subcutaneous Infusion route every 7 days Yes Historical Provider, MD   metFORMIN (GLUCOPHAGE) 850 MG tablet Take 850 mg by mouth 3 times daily  Yes Historical Provider, MD   glipiZIDE (GLUCOTROL) 5 MG tablet Take 20 mg by mouth Taking 4 tabs q am Yes Historical Provider, MD       Social History     Tobacco Use    Smoking status: Current Some Day Smoker    Smokeless tobacco: Former User     Types: Chew    Tobacco comment: 5-6 cigarettes qd, ocassional cigar   Substance Use Topics    Alcohol use: Yes     Alcohol/week: 4.0 standard drinks     Types: 4 Cans of beer per week    Drug use: No        Allergies   Allergen Reactions    No Known Allergies    ,   Past Medical History:   Diagnosis Date    Abdominal pain     Ankle arthralgia     Depression     Diarrhea     Diffuse cervicobrachial syndrome     Hyperlipidemia     Hypertension     Low back pain     Neck pain     Neck sprain     Psychosexual dysfunction associated with inhibited libido     Spasm     Type 2 diabetes mellitus without complication (HCC)    ,   Past Surgical History:   Procedure Laterality Date    COLONOSCOPY      12/8/2017 normal     SHOULDER ARTHROSCOPY Right     VASECTOMY  04/2017   ,   Social History     Tobacco Use    Smoking status: Current Some Day Smoker    Smokeless tobacco: Former User     Types: Chew    Tobacco comment: 5-6 cigarettes qd, ocassional cigar   Substance Use Topics    Alcohol use:  Yes     Alcohol/week: 4.0 standard drinks     Types: 4 Cans of beer per week    Drug use: No   ,   Immunization History   Administered Date(s) Administered    Hepatitis B 10/15/2010, 11/14/2010, 05/09/2011    Hepatitis B Adult (Recombivax HB) 10/15/2010, 11/14/2010, 05/09/2011    Influenza Vaccine, unspecified formulation 09/24/2012, 10/25/2013, 11/06/2014, 11/07/2014, 10/30/2015, 11/12/2018    Influenza Virus Vaccine 09/24/2012, 10/25/2013, 11/06/2014, 11/07/2014, 10/30/2015    Influenza, Quadv, IM, PF (6 mo and older Fluzone, Flulaval, Fluarix, and 3 yrs and older Afluria) 11/12/2018, 12/18/2019    Tdap (Boostrix, Adacel) 06/01/2009   ,   Health Maintenance   Topic Date Due    Pneumococcal 0-64 years Vaccine (1 of 1 - PPSV23) 09/07/1972    Diabetic foot homes.    --Colleen Donahue MD on 8/25/2020 at 7:49 AM    An electronic signature was used to authenticate this note.

## 2020-09-16 ENCOUNTER — OFFICE VISIT (OUTPATIENT)
Dept: FAMILY MEDICINE CLINIC | Age: 55
End: 2020-09-16
Payer: COMMERCIAL

## 2020-09-16 VITALS
OXYGEN SATURATION: 98 % | TEMPERATURE: 97.6 F | BODY MASS INDEX: 37.22 KG/M2 | HEIGHT: 70 IN | WEIGHT: 260 LBS | SYSTOLIC BLOOD PRESSURE: 118 MMHG | HEART RATE: 105 BPM | DIASTOLIC BLOOD PRESSURE: 82 MMHG

## 2020-09-16 PROBLEM — M25.462 EFFUSION OF LEFT KNEE: Status: ACTIVE | Noted: 2020-09-16

## 2020-09-16 PROCEDURE — G8417 CALC BMI ABV UP PARAM F/U: HCPCS | Performed by: FAMILY MEDICINE

## 2020-09-16 PROCEDURE — G8427 DOCREV CUR MEDS BY ELIG CLIN: HCPCS | Performed by: FAMILY MEDICINE

## 2020-09-16 PROCEDURE — 99213 OFFICE O/P EST LOW 20 MIN: CPT | Performed by: FAMILY MEDICINE

## 2020-09-16 PROCEDURE — 96372 THER/PROPH/DIAG INJ SC/IM: CPT | Performed by: FAMILY MEDICINE

## 2020-09-16 PROCEDURE — 3017F COLORECTAL CA SCREEN DOC REV: CPT | Performed by: FAMILY MEDICINE

## 2020-09-16 PROCEDURE — 4004F PT TOBACCO SCREEN RCVD TLK: CPT | Performed by: FAMILY MEDICINE

## 2020-09-16 RX ORDER — PREDNISONE 10 MG/1
TABLET ORAL
Qty: 30 TABLET | Refills: 0 | Status: SHIPPED
Start: 2020-09-16 | End: 2020-09-16

## 2020-09-16 RX ORDER — METHYLPREDNISOLONE ACETATE 80 MG/ML
80 INJECTION, SUSPENSION INTRA-ARTICULAR; INTRALESIONAL; INTRAMUSCULAR; SOFT TISSUE ONCE
Status: COMPLETED | OUTPATIENT
Start: 2020-09-16 | End: 2020-09-16

## 2020-09-16 RX ORDER — PREDNISONE 10 MG/1
TABLET ORAL
Qty: 30 TABLET | Refills: 0 | Status: SHIPPED
Start: 2020-09-16 | End: 2020-10-08

## 2020-09-16 RX ADMIN — METHYLPREDNISOLONE ACETATE 80 MG: 80 INJECTION, SUSPENSION INTRA-ARTICULAR; INTRALESIONAL; INTRAMUSCULAR; SOFT TISSUE at 10:13

## 2020-09-16 ASSESSMENT — ENCOUNTER SYMPTOMS
RESPIRATORY NEGATIVE: 1
GASTROINTESTINAL NEGATIVE: 1

## 2020-09-16 NOTE — LETTER
[address] [address]  Phone: [phone]  Fax: [address],         September 16, 2020     Patient: Linda Rosa   YOB: 1965   Date of Visit: 9/16/2020       To Whom it May Concern:    Linda Rosa was seen in my clinic on 9/16/2020. He may return to work on 9/17/2020. If you have any questions or concerns, please don't hesitate to call.     Sincerely,         Elba Mckeon, DO

## 2020-09-16 NOTE — PROGRESS NOTES
2020     Chinyere Lopes (:  1965) is a 48 y.o. male, here for evaluation of the following medical concerns:    HPI  Presents today for exacerbation of left knee pain. Patient states that he has chronic knee pain however he was working yesterday and he stepped down hard into a hole while carrying a heavy load pipe with another worker. Since that time there has been pain, swelling, and mild ambulatory dysfunction. Patient states that he has always had left knee problems since he injured it as a teenager. Review of Systems   Constitutional: Negative. HENT: Negative. Respiratory: Negative. Cardiovascular: Negative. Gastrointestinal: Negative. Musculoskeletal: Positive for arthralgias, gait problem, joint swelling and myalgias. All other systems reviewed and are negative. Prior to Visit Medications    Medication Sig Taking?  Authorizing Provider   NAPROXEN SODIUM PO Take by mouth as needed (knee pain) Yes Historical Provider, MD   lisinopril (PRINIVIL;ZESTRIL) 20 MG tablet Take 1 tablet by mouth 2 times daily Yes Rachel Partida MD   albuterol sulfate  (90 Base) MCG/ACT inhaler Inhale 2 puffs into the lungs 4 times daily as needed for Wheezing Every 4-6 hours as needed Yes Rachel Partida MD   atorvastatin (LIPITOR) 40 MG tablet Take 40 mg by mouth daily Yes Historical Provider, MD   insulin glargine (BASAGLAR KWIKPEN) 100 UNIT/ML injection pen Inject 27 Units into the skin every morning  Yes Historical Provider, MD   FARXIGA 10 MG tablet Take 1 tablet by mouth daily Yes Historical Provider, MD   TRULICITY 1.5 DZ/6.0HP SOPN 1.5 mg by Subcutaneous Infusion route every 7 days Yes Historical Provider, MD   metFORMIN (GLUCOPHAGE) 850 MG tablet Take 850 mg by mouth 3 times daily  Yes Historical Provider, MD   glipiZIDE (GLUCOTROL) 5 MG tablet Take 20 mg by mouth Taking 4 tabs q am Yes Historical Provider, MD        Social History     Tobacco Use    Smoking status: materials and/or home exercises printed for patient's review and were included in patient instructions on his/her After Visit Summary and given to patient at the end of visit. Patient and/or guardian given opportunity to ask questions/raise concerns. The patient verbalized comfort and understanding ofinstructions. Paul Junior D.O.   10:26 AM  9/16/2020       This document may have been prepared at least partially through the use of voice recognition software. Although effort is taken to assure the accuracy of this document, it is possible that grammatical, syntax,  or spelling errors may occur.

## 2020-10-08 ENCOUNTER — OFFICE VISIT (OUTPATIENT)
Dept: FAMILY MEDICINE CLINIC | Age: 55
End: 2020-10-08
Payer: COMMERCIAL

## 2020-10-08 ENCOUNTER — TELEPHONE (OUTPATIENT)
Dept: ADMINISTRATIVE | Age: 55
End: 2020-10-08

## 2020-10-08 VITALS
SYSTOLIC BLOOD PRESSURE: 108 MMHG | BODY MASS INDEX: 37.08 KG/M2 | WEIGHT: 259 LBS | TEMPERATURE: 97.9 F | HEIGHT: 70 IN | DIASTOLIC BLOOD PRESSURE: 74 MMHG | OXYGEN SATURATION: 97 % | HEART RATE: 109 BPM

## 2020-10-08 PROCEDURE — G8427 DOCREV CUR MEDS BY ELIG CLIN: HCPCS | Performed by: INTERNAL MEDICINE

## 2020-10-08 PROCEDURE — 3017F COLORECTAL CA SCREEN DOC REV: CPT | Performed by: INTERNAL MEDICINE

## 2020-10-08 PROCEDURE — G8484 FLU IMMUNIZE NO ADMIN: HCPCS | Performed by: INTERNAL MEDICINE

## 2020-10-08 PROCEDURE — G8417 CALC BMI ABV UP PARAM F/U: HCPCS | Performed by: INTERNAL MEDICINE

## 2020-10-08 PROCEDURE — 1036F TOBACCO NON-USER: CPT | Performed by: INTERNAL MEDICINE

## 2020-10-08 PROCEDURE — 99213 OFFICE O/P EST LOW 20 MIN: CPT | Performed by: INTERNAL MEDICINE

## 2020-10-08 RX ORDER — LOPERAMIDE HYDROCHLORIDE 2 MG/1
2 CAPSULE ORAL 4 TIMES DAILY PRN
COMMUNITY
End: 2022-02-22 | Stop reason: CLARIF

## 2020-10-08 NOTE — TELEPHONE ENCOUNTER
Kameron Melissa calling he has had diarrhea for 2 days. No other symptoms. Wished to schedule with pcp.  morales made

## 2020-10-08 NOTE — PROGRESS NOTES
10/8/20  Evelyn Rosa : 1965 Sex: male  Age: 48 y.o. Chief Complaint   Patient presents with    Diarrhea    Headache    Generalized Body Aches       HPI: The patient states that they have had headache, body aches, lightheaded diarrhea for the last 2 days. The diarrhea is described as brown, watery and unformed. The patient has had 6-7 episodes in the last 24 hours. The patient denies any black or bloody stools. No mucous in the stool. The patient does admit to a little abdominal cramping prior to diarrhea episodes. No nausea or vomiting. No fevers or chills. The patient does have a little decrease appetite but is still taking good PO fluids. No contact exposures that patient is aware of, no covid expsure. No new medications. No recent antibiotic use or travel. The patient denies dysuria, urinary frequency, urgency and or gross hematuria. States some flank pain. No chest pain or dyspnea. They come to the urgent care for evaluation. ROS:  Const: Positives and pertinent negatives as per HPI. All others reviewed and are negative.         Current Outpatient Medications:     loperamide (IMODIUM) 2 MG capsule, Take 2 mg by mouth 4 times daily as needed for Diarrhea, Disp: , Rfl:     NAPROXEN SODIUM PO, Take by mouth as needed (knee pain), Disp: , Rfl:     lisinopril (PRINIVIL;ZESTRIL) 20 MG tablet, Take 1 tablet by mouth 2 times daily, Disp: 180 tablet, Rfl: 1    albuterol sulfate  (90 Base) MCG/ACT inhaler, Inhale 2 puffs into the lungs 4 times daily as needed for Wheezing Every 4-6 hours as needed, Disp: 1 Inhaler, Rfl: 5    atorvastatin (LIPITOR) 40 MG tablet, Take 40 mg by mouth daily, Disp: , Rfl:     insulin glargine (BASAGLAR KWIKPEN) 100 UNIT/ML injection pen, Inject 27 Units into the skin every morning , Disp: , Rfl:     FARXIGA 10 MG tablet, Take 1 tablet by mouth daily, Disp: , Rfl: 1    TRULICITY 1.5 BX/5.8GM SOPN, 1.5 mg by Subcutaneous Infusion route every 7 days, member of club or organization: Not on file     Attends meetings of clubs or organizations: Not on file     Relationship status: Not on file    Intimate partner violence     Fear of current or ex partner: Not on file     Emotionally abused: Not on file     Physically abused: Not on file     Forced sexual activity: Not on file   Other Topics Concern    Not on file   Social History Narrative    Not on file       Vitals:    10/08/20 1330   BP: 108/74   Pulse: 109   Temp: 97.9 °F (36.6 °C)   SpO2: 97%   Weight: 259 lb (117.5 kg)   Height: 5' 10\" (1.778 m)       Exam:  Physical Exam  Vitals signs reviewed. Constitutional:       Appearance: He is well-developed. HENT:      Head: Normocephalic and atraumatic. Right Ear: External ear normal.      Left Ear: External ear normal.   Eyes:      Pupils: Pupils are equal, round, and reactive to light. Neck:      Musculoskeletal: Normal range of motion and neck supple. Thyroid: No thyromegaly. Cardiovascular:      Rate and Rhythm: Normal rate and regular rhythm. Heart sounds: Normal heart sounds. No murmur. Pulmonary:      Effort: Pulmonary effort is normal.      Breath sounds: Normal breath sounds. No wheezing or rales. Abdominal:      General: Bowel sounds are normal.      Palpations: Abdomen is soft. Tenderness: There is no abdominal tenderness. There is no guarding or rebound. Musculoskeletal: Normal range of motion. Lymphadenopathy:      Cervical: No cervical adenopathy. Skin:     General: Skin is warm and dry. Neurological:      Mental Status: He is alert and oriented to person, place, and time. Cranial Nerves: No cranial nerve deficit. Psychiatric:         Judgment: Judgment normal.         Assessment and Plan:   Lee Ann Geller was seen today for diarrhea, headache and generalized body aches.     Diagnoses and all orders for this visit:    Diarrhea, unspecified type  Comments:  uncertain etiology   poss viral gastroenteritis, doubt covid   fluids   bland diet - advance as tolerated   worsening ER     Follow-up with your primary care provider in 7-10 days for re-evaluation and further management. Return  sooner or go to the Emergency Department immediately if your condition changes or worsens. Return for check up and review, as scheduled.     Pedro Wade MD

## 2020-10-08 NOTE — TELEPHONE ENCOUNTER
Please call patient he has had diarrhea for 2 days, advised flu clinic. Wants to talk to Dr Clyde Bryant office.

## 2020-10-08 NOTE — LETTER
[address] [place] [address]  Phone: [phone]  Fax: [phone]    Ronak Goldsmith MD        October 8, 2020     Patient: Mallorie Baig   YOB: 1965   Date of Visit: 10/8/2020       To Whom It May Concern: It is my medical opinion that Mallorie Baig may return to full duty immediately with no restrictions. If you have any questions or concerns, please don't hesitate to call.     Sincerely,        Ronak Goldsmith MD

## 2020-10-27 LAB
ALBUMIN SERPL-MCNC: 4.8 G/DL
ALP BLD-CCNC: 71 U/L
ALT SERPL-CCNC: 42 U/L
ANION GAP SERPL CALCULATED.3IONS-SCNC: NORMAL MMOL/L
AST SERPL-CCNC: 25 U/L
AVERAGE GLUCOSE: NORMAL
BILIRUB SERPL-MCNC: 0.7 MG/DL (ref 0.1–1.4)
BUN BLDV-MCNC: 23 MG/DL
CALCIUM SERPL-MCNC: 10 MG/DL
CHLORIDE BLD-SCNC: 102 MMOL/L
CHOLESTEROL, TOTAL: 159 MG/DL
CHOLESTEROL/HDL RATIO: NORMAL
CO2: 27 MMOL/L
CREAT SERPL-MCNC: 0.88 MG/DL
GFR CALCULATED: 97
GLUCOSE BLD-MCNC: 126 MG/DL
HBA1C MFR BLD: 8 %
HDLC SERPL-MCNC: 37 MG/DL (ref 35–70)
LDL CHOLESTEROL CALCULATED: 101 MG/DL (ref 0–160)
NONHDLC SERPL-MCNC: NORMAL MG/DL
POTASSIUM SERPL-SCNC: 5 MMOL/L
SODIUM BLD-SCNC: 137 MMOL/L
TOTAL PROTEIN: 7.3
TRIGL SERPL-MCNC: 113 MG/DL
TSH SERPL DL<=0.05 MIU/L-ACNC: 2.26 UIU/ML
VLDLC SERPL CALC-MCNC: NORMAL MG/DL

## 2020-10-28 ENCOUNTER — VIRTUAL VISIT (OUTPATIENT)
Dept: FAMILY MEDICINE CLINIC | Age: 55
End: 2020-10-28
Payer: COMMERCIAL

## 2020-10-28 PROCEDURE — G8427 DOCREV CUR MEDS BY ELIG CLIN: HCPCS | Performed by: INTERNAL MEDICINE

## 2020-10-28 PROCEDURE — 3017F COLORECTAL CA SCREEN DOC REV: CPT | Performed by: INTERNAL MEDICINE

## 2020-10-28 PROCEDURE — 99213 OFFICE O/P EST LOW 20 MIN: CPT | Performed by: INTERNAL MEDICINE

## 2020-10-28 RX ORDER — METHYLPREDNISOLONE 4 MG/1
TABLET ORAL
Qty: 1 KIT | Refills: 0 | Status: SHIPPED | OUTPATIENT
Start: 2020-10-28 | End: 2020-11-03

## 2020-10-28 ASSESSMENT — ENCOUNTER SYMPTOMS
SORE THROAT: 0
BLOOD IN STOOL: 0
CONSTIPATION: 0
COUGH: 0
ABDOMINAL PAIN: 0
DIARRHEA: 0
SHORTNESS OF BREATH: 0
CHEST TIGHTNESS: 0
NAUSEA: 0
RHINORRHEA: 0
EYE PAIN: 0
VOMITING: 0

## 2020-10-28 NOTE — PROGRESS NOTES
TeleMedicine Video Visit    This visit was performed as a virtual video visit using a synchronous, two-way, audio-video telehealth technology platform. Patient identification was verified at the start of the visit, including the patient's telephone number and physical location. I discussed with the patient the nature of our telehealth visits, that:     1. Due to the nature of an audio- video modality, the only components of a physical exam that could be done are the elements supported by direct observation. 2. I would evaluate the patient and recommend diagnostics and treatments based on my assessment. 3. If it was felt that the patient should be evaluated in clinic or an emergency room setting, then they would be directed there. 4. Our sessions are not being recorded and that personal health information is protected. 5. Our team would provide follow up care in person if/when the patient needs it. Patient does agree to proceed with telemedicine consultation. Patient's location: other address in [facility]   Physician  location office  other people involved in call - none       Time spent: Greater than Not billed by time    This visit was completed virtually using Doxy. me        10/28/2020    TELEHEALTH EVALUATION -- Audio/Visual (During YMDUZ-19 public health emergency)    HPI:    Remigio Antoine (:  1965) has requested an audio/video evaluation for the following concern(s):    States having left knee pain. States did bump knee. States has been getting tighter. States pain to bend knee. No swelling. No erythema. States tightness to posterior knee and calf. States could not place knee brace. States requires him to be on knees all day and gets up and down frequently. States has been using ice, espsom salt oak and elevating. States has been taking naproxen as normal. No fever or chills.      Review of Systems   Constitutional: Negative for appetite change, chills, fever and unexpected weight Take 850 mg by mouth 3 times daily  Yes Historical Provider, MD   glipiZIDE (GLUCOTROL) 5 MG tablet Take 20 mg by mouth Taking 4 tabs q am Yes Historical Provider, MD       Social History     Tobacco Use    Smoking status: Former Smoker     Years: 20.00     Last attempt to quit: 2020     Years since quittin.2    Smokeless tobacco: Former User     Types: Chew    Tobacco comment: 5-6 cigarettes qd, ocassional cigar   Substance Use Topics    Alcohol use: Yes     Alcohol/week: 4.0 standard drinks     Types: 4 Cans of beer per week    Drug use: No        Allergies   Allergen Reactions    No Known Allergies    ,   Past Medical History:   Diagnosis Date    Abdominal pain     Ankle arthralgia     Depression     Diarrhea     Diffuse cervicobrachial syndrome     Hyperlipidemia     Hypertension     Low back pain     Neck pain     Neck sprain     Psychosexual dysfunction associated with inhibited libido     Spasm     Type 2 diabetes mellitus without complication (HCC)    ,   Past Surgical History:   Procedure Laterality Date    COLONOSCOPY      2017 normal     SHOULDER ARTHROSCOPY Right     VASECTOMY  2017   ,   Social History     Tobacco Use    Smoking status: Former Smoker     Years: 20.00     Last attempt to quit: 2020     Years since quittin.2    Smokeless tobacco: Former User     Types: Chew    Tobacco comment: 5-6 cigarettes qd, ocassional cigar   Substance Use Topics    Alcohol use:  Yes     Alcohol/week: 4.0 standard drinks     Types: 4 Cans of beer per week    Drug use: No   ,   Immunization History   Administered Date(s) Administered    Hepatitis B 10/15/2010, 2010, 2011    Hepatitis B Adult (Recombivax HB) 10/15/2010, 2010, 2011    Influenza Vaccine, unspecified formulation 2012, 10/25/2013, 2014, 2014, 10/30/2015, 2018    Influenza Virus Vaccine 2012, 10/25/2013, 2014, 2014, 10/30/2015    range of motion of neck        [x] Abnormal- could not visually evaluate knee or LE       Neurological:        [x] No Facial Asymmetry (Cranial nerve 7 motor function) (limited exam to video visit)          [] No gaze palsy        [] Abnormal-         Skin:        [x] No significant exanthematous lesions or discoloration noted on facial skin         [] Abnormal-            Psychiatric:       [x] Normal Affect [] No Hallucinations        [] Abnormal-     Other pertinent observable physical exam findings-     ASSESSMENT/PLAN:  1. Acute pain of left knee  - has hx of OA   - poss tendonitis or bursitis - cannot r/o popliteal cyst   - recommended US given calf tightness or update xray - declines at present   - recommend ice, elevation and brace of able  - medrol pack x 1   - call or come back if not improving   - consider ortho referral jeff ongoing issues. Return for check up and review, as scheduled. No orders of the defined types were placed in this encounter. Requested Prescriptions     Signed Prescriptions Disp Refills    methylPREDNISolone (MEDROL DOSEPACK) 4 MG tablet 1 kit 0     Sig: Take by mouth. Suzette Reeves is a 48 y.o. male being evaluated by a Virtual Visit (video visit) encounter to address concerns as mentioned above. A caregiver was present when appropriate. Due to this being a TeleHealth encounter (During LWIRX-02 public health emergency), evaluation of the following organ systems was limited: Vitals/Constitutional/EENT/Resp/CV/GI//MS/Neuro/Skin/Heme-Lymph-Imm. Pursuant to the emergency declaration under the [facility], [address] [place] [address] authority and the [employer] Act, this Virtual Visit was conducted with patient's (and/or legal guardian's) consent, to reduce the patient's risk of exposure to COVID-19 and provide necessary medical care.   The patient (and/or legal guardian) has also been advised to contact this office for worsening conditions or problems, and seek emergency medical treatment and/or call 911 if deemed necessary. Patient identification was verified at the start of the visit: Yes    Total time spent on this encounter: Not billed by time    Services were provided through a video synchronous discussion virtually to substitute for in-person clinic visit. Patient and provider were located at their individual homes. --Sina Rubalcava MD on 10/28/2020 at 12:23 PM    An electronic signature was used to authenticate this note.

## 2020-10-29 ENCOUNTER — TELEPHONE (OUTPATIENT)
Dept: FAMILY MEDICINE CLINIC | Age: 55
End: 2020-10-29

## 2020-10-29 NOTE — TELEPHONE ENCOUNTER
Please let the patient know that blood work from endocrinology was received    Cholesterol levels were borderline elevated, may need to consider increased dose of the Lipitor.   Otherwise would continue present medications and continue to watch diet    Sugar was slightly elevated    Potassium level was normal but borderline high    Other electrolytes, liver functions, and kidney function values were normal    Thyroid levels were normal    Thanks

## 2020-12-18 ENCOUNTER — OFFICE VISIT (OUTPATIENT)
Dept: PRIMARY CARE CLINIC | Age: 55
End: 2020-12-18
Payer: COMMERCIAL

## 2020-12-18 VITALS
BODY MASS INDEX: 38.48 KG/M2 | WEIGHT: 268.8 LBS | HEIGHT: 70 IN | RESPIRATION RATE: 16 BRPM | TEMPERATURE: 98.2 F | OXYGEN SATURATION: 98 % | SYSTOLIC BLOOD PRESSURE: 138 MMHG | HEART RATE: 68 BPM | DIASTOLIC BLOOD PRESSURE: 82 MMHG

## 2020-12-18 PROBLEM — G89.29 CHRONIC PAIN OF BOTH KNEES: Status: ACTIVE | Noted: 2020-12-18

## 2020-12-18 PROBLEM — M25.561 CHRONIC PAIN OF BOTH KNEES: Status: ACTIVE | Noted: 2020-12-18

## 2020-12-18 PROBLEM — M25.562 CHRONIC PAIN OF BOTH KNEES: Status: ACTIVE | Noted: 2020-12-18

## 2020-12-18 PROCEDURE — 2022F DILAT RTA XM EVC RTNOPTHY: CPT | Performed by: INTERNAL MEDICINE

## 2020-12-18 PROCEDURE — 3052F HG A1C>EQUAL 8.0%<EQUAL 9.0%: CPT | Performed by: INTERNAL MEDICINE

## 2020-12-18 PROCEDURE — G8427 DOCREV CUR MEDS BY ELIG CLIN: HCPCS | Performed by: INTERNAL MEDICINE

## 2020-12-18 PROCEDURE — 99213 OFFICE O/P EST LOW 20 MIN: CPT | Performed by: INTERNAL MEDICINE

## 2020-12-18 PROCEDURE — 3017F COLORECTAL CA SCREEN DOC REV: CPT | Performed by: INTERNAL MEDICINE

## 2020-12-18 PROCEDURE — 1036F TOBACCO NON-USER: CPT | Performed by: INTERNAL MEDICINE

## 2020-12-18 PROCEDURE — G8484 FLU IMMUNIZE NO ADMIN: HCPCS | Performed by: INTERNAL MEDICINE

## 2020-12-18 PROCEDURE — G8417 CALC BMI ABV UP PARAM F/U: HCPCS | Performed by: INTERNAL MEDICINE

## 2020-12-18 RX ORDER — METHYLPREDNISOLONE 4 MG/1
TABLET ORAL
Qty: 1 KIT | Refills: 0 | Status: SHIPPED | OUTPATIENT
Start: 2020-12-18 | End: 2020-12-24

## 2020-12-18 RX ORDER — LISINOPRIL 20 MG/1
20 TABLET ORAL 2 TIMES DAILY
Qty: 180 TABLET | Refills: 1 | Status: SHIPPED
Start: 2020-12-18 | End: 2021-07-29 | Stop reason: SDUPTHER

## 2020-12-18 ASSESSMENT — ENCOUNTER SYMPTOMS
BLOOD IN STOOL: 0
RHINORRHEA: 0
SHORTNESS OF BREATH: 0
EYE PAIN: 0
ABDOMINAL PAIN: 0
DIARRHEA: 0
SORE THROAT: 0
CHEST TIGHTNESS: 0
NAUSEA: 0
COUGH: 0
BACK PAIN: 1
VOMITING: 0
CONSTIPATION: 0

## 2020-12-18 NOTE — PROGRESS NOTES
[facility]) Physicians   Internal Medicine     2020  Ethel Martines : 1965 Sex: male  Age:55 y.o. Chief Complaint   Patient presents with    Diabetes        HPI:   Patient presents to office for review and management of chronic medical conditions.    - States in workers comp. States had injury to left hand and caused arthritis. Following with ortho hand     - States still having bilateral knee pain. Stable. States has seen ortho and had injection to left knee. No erythema. No buckling. No locking. Worse with ambulation. No hip pain. no back pain. Taking OTC supplement (aleve). No follow up. - States low back pain has improved. had a fall on ice.     - States having right posterior shoulder pain. Improved. States some tingling in arms. States comes and goes. Declines evaluation. Stable. - States has chronic allergies. On Nasacort. On allovert. On singulair, Has seen allergy in the past.still has nasal congestion Some rhinorrhea. No ear pain. No facial pressure. No fever of chills. No chest pain, SOB. States cough. States taking allavert. Has ProAir inhaler if needed, uses sparingly.    - States erectile issues are stable. States having difficulty maintaining erection. Stable. - States has had follow up with endocrinology. Basaglar increased to 31 units and glipizide 5mg 4 tabs am and metformin tid. On jardiance 25mg daily . On trulicity. States some hypoglycemia. States has had eye exam and podiatry exam. States sugar running 120-140. All < 200. States walking alot. States last A1c was 8.0 per Patient (10/2020)    - States has high cholesterol. States was placed on Lipitor by endocrinology. States watches diet. - States high blood pressure. States does not check blood pressure at home. On lisinopril.  No reported side effects.    - States walks 8-10 miles per day at work    Health Maintenance   - immunizations:   Influenza Vaccination - (2018), (2019), (2020   Pneumonia Vaccination - declines   Shingles vaccine (shingrix) - declines  Tetanus Vaccination - (2009)     - Screenings:   Colonoscopy  - (12/2017) - normal - follow up 10yrs  Prostate     Couseled on Home Safety     ROS:  Review of Systems   Constitutional: Negative for appetite change, chills, fever and unexpected weight change. HENT: Negative for congestion, rhinorrhea and sore throat. Eyes: Negative for pain and visual disturbance. Respiratory: Negative for cough, chest tightness and shortness of breath. Cardiovascular: Negative for chest pain and palpitations. Gastrointestinal: Negative for abdominal pain, blood in stool, constipation, diarrhea, nausea and vomiting. Genitourinary: Negative for difficulty urinating, dysuria, hematuria, scrotal swelling, testicular pain and urgency. Musculoskeletal: Positive for arthralgias and back pain. Negative for gait problem. Skin: Negative for rash. Neurological: Negative for dizziness, syncope, weakness, light-headedness and headaches. Hematological: Negative for adenopathy. Does not bruise/bleed easily. Psychiatric/Behavioral: Negative for suicidal ideas. The patient is not nervous/anxious.           Current Outpatient Medications:     empagliflozin (JARDIANCE) 25 MG tablet, Take 25 mg by mouth daily, Disp: , Rfl:     diclofenac sodium (VOLTAREN) 1 % GEL, Apply 2 g topically 2 times daily, Disp: 50 g, Rfl: 1    lisinopril (PRINIVIL;ZESTRIL) 20 MG tablet, Take 1 tablet by mouth 2 times daily, Disp: 180 tablet, Rfl: 1    methylPREDNISolone (MEDROL DOSEPACK) 4 MG tablet, Take by mouth., Disp: 1 kit, Rfl: 0    loperamide (IMODIUM) 2 MG capsule, Take 2 mg by mouth 4 times daily as needed for Diarrhea, Disp: , Rfl:     NAPROXEN SODIUM PO, Take by mouth as needed (knee pain), Disp: , Rfl:     albuterol sulfate  (90 Base) MCG/ACT inhaler, Inhale 2 puffs into the lungs 4 times daily as needed for Wheezing Every 4-6 hours as needed, Disp: 1 Inhaler, Rfl: 5    atorvastatin (LIPITOR) 40 MG tablet, Take 40 mg by mouth daily, Disp: , Rfl:     insulin glargine (BASAGLAR KWIKPEN) 100 UNIT/ML injection pen, Inject 31 Units into the skin every morning, Disp: , Rfl:     TRULICITY 1.5 ZI/3.2ZD SOPN, 1.5 mg by Subcutaneous Infusion route every 7 days, Disp: , Rfl: 1    metFORMIN (GLUCOPHAGE) 850 MG tablet, Take 850 mg by mouth 3 times daily , Disp: , Rfl:     glipiZIDE (GLUCOTROL) 5 MG tablet, Take 20 mg by mouth Taking 4 tabs q am, Disp: , Rfl:     Allergies   Allergen Reactions    No Known Allergies        Past Medical History:   Diagnosis Date    Abdominal pain     Ankle arthralgia     Depression     Diarrhea     Diffuse cervicobrachial syndrome     Hyperlipidemia     Hypertension     Low back pain     Neck pain     Neck sprain     Psychosexual dysfunction associated with inhibited libido     Spasm     Type 2 diabetes mellitus without complication [facility])        Past Surgical History:   Procedure Laterality Date    COLONOSCOPY      2017 normal     SHOULDER ARTHROSCOPY Right     VASECTOMY  2017       History reviewed. No pertinent family history. Social History     Socioeconomic History    Marital status:      Spouse name: Asya Desai Number of children: 2    Years of education: 13    Highest education level: Some college, no degree   Occupational History    Occupation:    Social Needs    Financial resource strain: Not very hard    Food insecurity     Worry: Never true     Inability: Never true    Transportation needs     Medical: No     Non-medical: No   Tobacco Use    Smoking status: Former Smoker     Years: 20.00     Quit date: 2020     Years since quittin.3    Smokeless tobacco: Former User     Types: Chew    Tobacco comment: 5-6 cigarettes qd, ocassional cigar   Substance and Sexual Activity    Alcohol use: Yes     Alcohol/week: 4.0 standard drinks     Types: 4 Cans of beer per week    Drug use:  No  Sexual activity: Not on file   Lifestyle    Physical activity     Days per week: Not on file     Minutes per session: Not on file    Stress: Not on file   Relationships    Social connections     Talks on phone: Not on file     Gets together: Not on file     Attends [employer] service: Not on file     Active member of club or organization: Not on file     Attends meetings of clubs or organizations: Not on file     Relationship status: Not on file    Intimate partner violence     Fear of current or ex partner: Not on file     Emotionally abused: Not on file     Physically abused: Not on file     Forced sexual activity: Not on file   Other Topics Concern    Not on file   Social History Narrative    Not on file       Vitals:    12/18/20 1420   BP: 138/82   Pulse: 68   Resp: 16   Temp: 98.2 °F (36.8 °C)   SpO2: 98%   Weight: 268 lb 12.8 oz (121.9 kg)   Height: 5' 10\" (1.778 m)       Exam:  Physical Exam  Constitutional:       Appearance: He is well-developed. HENT:      Head: Normocephalic and atraumatic. Right Ear: External ear normal.      Left Ear: External ear normal.   Eyes:      Pupils: Pupils are equal, round, and reactive to light. Neck:      Musculoskeletal: Normal range of motion and neck supple. Thyroid: No thyromegaly. Cardiovascular:      Rate and Rhythm: Normal rate and regular rhythm. Heart sounds: Normal heart sounds. No murmur. Pulmonary:      Effort: Pulmonary effort is normal.      Breath sounds: Normal breath sounds. No wheezing or rales. Abdominal:      General: Bowel sounds are normal.      Palpations: Abdomen is soft. Tenderness: There is no abdominal tenderness. There is no guarding or rebound. Musculoskeletal: Normal range of motion. Lymphadenopathy:      Cervical: No cervical adenopathy. Skin:     General: Skin is warm and dry. Neurological:      Mental Status: He is alert and oriented to person, place, and time.       Cranial Nerves: No cranial nerve deficit. Psychiatric:         Judgment: Judgment normal.         Assessment and Plan:    Lenka Cody was seen today for hypertension. Diagnoses and all orders for this visit:    Tachycardia  - declines EKG     Chronic allergic rhinitis  - not improving   - has tried antihistamine   - recommend floase or nasacort  - add Singulair   - declines pulm for poss asthma     Type 2 diabetes mellitus with complication, with long-term current use of insulin (HCC)  - continue present treatment  - following with endocrinology  - now on Basaglar 31 units  - on glucotrol, metformin, jardiance and trulicity  - last Z4L was 8.0 (10/2020)    On Statin   On ACE  follows with optho     Benign essential hypertension  - continue present treatment  - monitor blood pressure at home  - watch diet, low sodium   - on lisinopril   - Stable     Mixed hyperlipidemia  - continue present treatment  - watch diet   - on lipitor  - follow labs     Acute pain of right shoulder  - declines work up  - consider xray, PT, EMG and/or ortho    - stable     Generalized osteoarthritis  - following with ortho  - s/p synvisc to knee   - stable    Chronic pain of both knees  - medrol pack   - refer to ortho for 2nd opinion     Tobacco use  - Counseled to stop smoking   - stopped 8/2020     Erectile dysfunction, unspecified erectile dysfunction type  - continue present treatment  - poss related to DM and HTN  - stable    Return in about 6 months (around 6/18/2021) for check up and review and labs.     Orders Placed This Encounter   Procedures   Eleazar Rodriguez MD, Orthopaedics, Josefina Steve     Referral Priority:   Routine     Referral Type:   Eval and Treat     Referral Reason:   Specialty Services Required     Referred to Provider:   Ken Espinosa     Requested Specialty:   Orthopedic Surgery     Number of Visits Requested:   1     Requested Prescriptions     Signed Prescriptions Disp Refills    diclofenac sodium (VOLTAREN) 1 % GEL 50 g 1     Sig: Apply 2 g topically 2 times daily    lisinopril (PRINIVIL;ZESTRIL) 20 MG tablet 180 tablet 1     Sig: Take 1 tablet by mouth 2 times daily    methylPREDNISolone (MEDROL DOSEPACK) 4 MG tablet 1 kit 0     Sig: Take by mouth.        Monie Baltazar MD  12/18/2020  2:42 PM

## 2020-12-31 ENCOUNTER — TELEPHONE (OUTPATIENT)
Dept: ADMINISTRATIVE | Age: 55
End: 2020-12-31

## 2021-02-04 DIAGNOSIS — G89.29 CHRONIC PAIN OF BOTH KNEES: ICD-10-CM

## 2021-02-04 DIAGNOSIS — M25.562 CHRONIC PAIN OF BOTH KNEES: ICD-10-CM

## 2021-02-04 DIAGNOSIS — M25.561 CHRONIC PAIN OF BOTH KNEES: ICD-10-CM

## 2021-02-04 NOTE — TELEPHONE ENCOUNTER
Pharmacy is requesting a refill    Last Appointment:  12/18/2020  Future Appointments   Date Time Provider Ankit Troy   6/18/2021  7:45 AM MD JIAN Woodard [facility]

## 2021-02-08 ENCOUNTER — OFFICE VISIT (OUTPATIENT)
Dept: FAMILY MEDICINE CLINIC | Age: 56
End: 2021-02-08
Payer: COMMERCIAL

## 2021-02-08 VITALS
DIASTOLIC BLOOD PRESSURE: 78 MMHG | SYSTOLIC BLOOD PRESSURE: 136 MMHG | RESPIRATION RATE: 16 BRPM | OXYGEN SATURATION: 98 % | TEMPERATURE: 97.3 F | HEART RATE: 96 BPM

## 2021-02-08 DIAGNOSIS — J01.10 ACUTE NON-RECURRENT FRONTAL SINUSITIS: ICD-10-CM

## 2021-02-08 DIAGNOSIS — U07.1 2019 NOVEL CORONAVIRUS DISEASE (COVID-19): ICD-10-CM

## 2021-02-08 DIAGNOSIS — U07.1 COVID-19: ICD-10-CM

## 2021-02-08 DIAGNOSIS — U07.1 2019 NOVEL CORONAVIRUS DISEASE (COVID-19): Primary | ICD-10-CM

## 2021-02-08 LAB
Lab: NORMAL
QC PASS/FAIL: NORMAL
SARS-COV-2, POC: NORMAL

## 2021-02-08 PROCEDURE — 3017F COLORECTAL CA SCREEN DOC REV: CPT | Performed by: FAMILY MEDICINE

## 2021-02-08 PROCEDURE — 1036F TOBACCO NON-USER: CPT | Performed by: FAMILY MEDICINE

## 2021-02-08 PROCEDURE — 99213 OFFICE O/P EST LOW 20 MIN: CPT | Performed by: FAMILY MEDICINE

## 2021-02-08 PROCEDURE — G8427 DOCREV CUR MEDS BY ELIG CLIN: HCPCS | Performed by: FAMILY MEDICINE

## 2021-02-08 PROCEDURE — G8417 CALC BMI ABV UP PARAM F/U: HCPCS | Performed by: FAMILY MEDICINE

## 2021-02-08 PROCEDURE — 87426 SARSCOV CORONAVIRUS AG IA: CPT | Performed by: FAMILY MEDICINE

## 2021-02-08 PROCEDURE — G8484 FLU IMMUNIZE NO ADMIN: HCPCS | Performed by: FAMILY MEDICINE

## 2021-02-08 RX ORDER — AMOXICILLIN 250 MG/1
250 CAPSULE ORAL 3 TIMES DAILY
Qty: 30 CAPSULE | Refills: 0 | Status: SHIPPED | OUTPATIENT
Start: 2021-02-08 | End: 2021-02-18

## 2021-02-08 RX ORDER — CETIRIZINE HYDROCHLORIDE 10 MG/1
10 TABLET ORAL DAILY
Qty: 30 TABLET | Refills: 1 | Status: SHIPPED
Start: 2021-02-08 | End: 2021-04-29

## 2021-02-08 ASSESSMENT — ENCOUNTER SYMPTOMS
COUGH: 1
SORE THROAT: 0
SINUS PAIN: 1
CHEST TIGHTNESS: 1
RHINORRHEA: 1
SHORTNESS OF BREATH: 1
SINUS PRESSURE: 1
EYES NEGATIVE: 1

## 2021-02-08 NOTE — PROGRESS NOTES
21  [facility] : 1965 Sex: male  Age: 48 y.o. Chief Complaint   Patient presents with    Head Congestion    Chest Congestion    Cough       Patient is a 23-year-old white male with the chief complaint of head congestion chest congestion cough some mild shortness of breath no fevers chills no hemoptysis no loss of smell or taste. No nausea vomiting or diarrhea. Review of Systems   Constitutional: Positive for fatigue. HENT: Positive for congestion, postnasal drip, rhinorrhea, sinus pressure, sinus pain and sneezing. Negative for sore throat. Eyes: Negative. Respiratory: Positive for cough, chest tightness and shortness of breath. Cardiovascular: Negative.           Current Outpatient Medications:     cetirizine (ZYRTEC) 10 MG tablet, Take 1 tablet by mouth daily, Disp: 30 tablet, Rfl: 1    amoxicillin (AMOXIL) 250 MG capsule, Take 1 capsule by mouth 3 times daily for 10 days, Disp: 30 capsule, Rfl: 0    diclofenac sodium (VOLTAREN) 1 % GEL, Apply 2 g topically 2 times daily, Disp: 50 g, Rfl: 3    empagliflozin (JARDIANCE) 25 MG tablet, Take 25 mg by mouth daily, Disp: , Rfl:     lisinopril (PRINIVIL;ZESTRIL) 20 MG tablet, Take 1 tablet by mouth 2 times daily, Disp: 180 tablet, Rfl: 1    loperamide (IMODIUM) 2 MG capsule, Take 2 mg by mouth 4 times daily as needed for Diarrhea, Disp: , Rfl:     NAPROXEN SODIUM PO, Take by mouth as needed (knee pain), Disp: , Rfl:     albuterol sulfate  (90 Base) MCG/ACT inhaler, Inhale 2 puffs into the lungs 4 times daily as needed for Wheezing Every 4-6 hours as needed, Disp: 1 Inhaler, Rfl: 5    atorvastatin (LIPITOR) 40 MG tablet, Take 40 mg by mouth daily, Disp: , Rfl:     insulin glargine (BASAGLAR KWIKPEN) 100 UNIT/ML injection pen, Inject 31 Units into the skin every morning, Disp: , Rfl:     TRULICITY 1.5 DK/5.9NN SOPN, 1.5 mg by Subcutaneous Infusion route every 7 days, Disp: , Rfl: 1    metFORMIN (GLUCOPHAGE) 850 MG tablet, Take 850 mg by mouth 3 times daily , Disp: , Rfl:     glipiZIDE (GLUCOTROL) 5 MG tablet, Take 20 mg by mouth Taking 4 tabs q am, Disp: , Rfl:   Allergies   Allergen Reactions    No Known Allergies        Past Medical History:   Diagnosis Date    Abdominal pain     Ankle arthralgia     Depression     Diarrhea     Diffuse cervicobrachial syndrome     Hyperlipidemia     Hypertension     Low back pain     Neck pain     Neck sprain     Psychosexual dysfunction associated with inhibited libido     Spasm     Type 2 diabetes mellitus without complication [facility])      Past Surgical History:   Procedure Laterality Date    COLONOSCOPY      2017 normal     SHOULDER ARTHROSCOPY Right     VASECTOMY  2017     No family history on file. Social History     Tobacco Use    Smoking status: Former Smoker     Years: 20.00     Quit date: 2020     Years since quittin.5    Smokeless tobacco: Former User     Types: Chew    Tobacco comment: 5-6 cigarettes qd, ocassional cigar   Substance Use Topics    Alcohol use: Yes     Alcohol/week: 4.0 standard drinks     Types: 4 Cans of beer per week    Drug use: No        Vitals:    21 1051   BP: 136/78   Pulse: 96   Resp: 16   Temp: 97.3 °F (36.3 °C)   TempSrc: Temporal   SpO2: 98%       Physical Exam  Vitals signs reviewed. Constitutional:       Appearance: Normal appearance. HENT:      Head: Normocephalic and atraumatic. Right Ear: Ear canal and external ear normal. There is no impacted cerumen. Left Ear: Tympanic membrane, ear canal and external ear normal. There is no impacted cerumen. Nose: Rhinorrhea present. Mouth/Throat:      Mouth: Mucous membranes are moist.      Pharynx: Oropharynx is clear. No oropharyngeal exudate or posterior oropharyngeal erythema. Eyes:      Conjunctiva/sclera: Conjunctivae normal.   Neck:      Musculoskeletal: No muscular tenderness.    Cardiovascular:      Rate and Rhythm: Normal rate and regular rhythm. Heart sounds: No murmur. Pulmonary:      Effort: Pulmonary effort is normal.      Breath sounds: Normal breath sounds. No wheezing, rhonchi or rales. Chest:      Chest wall: No tenderness. Lymphadenopathy:      Cervical: No cervical adenopathy. Neurological:      Mental Status: He is alert. Assessment and Plan:  Sadia Tomas was seen today for head congestion, chest congestion and cough. Diagnoses and all orders for this visit:    2019 novel coronavirus disease (COVID-19)  -     POCT COVID-19, Antigen    Acute non-recurrent frontal sinusitis    COVID-19    Other orders  -     cetirizine (ZYRTEC) 10 MG tablet; Take 1 tablet by mouth daily  -     amoxicillin (AMOXIL) 250 MG capsule; Take 1 capsule by mouth 3 times daily for 10 days        Orders Placed This Encounter   Medications    cetirizine (ZYRTEC) 10 MG tablet     Sig: Take 1 tablet by mouth daily     Dispense:  30 tablet     Refill:  1    amoxicillin (AMOXIL) 250 MG capsule     Sig: Take 1 capsule by mouth 3 times daily for 10 days     Dispense:  30 capsule     Refill:  0        Patient advised to follow up with PCP as needed. Seen By:  DO David Marquez Covid swab was negative we did a PCR on further treatment is pending.

## 2021-02-09 ENCOUNTER — TELEPHONE (OUTPATIENT)
Dept: ADMINISTRATIVE | Age: 56
End: 2021-02-09

## 2021-02-09 NOTE — LETTER
[facility] PRE SERVICES  No information on file. Mayra Wetzel MD        February 9, 2021     Patient: Lester Stock   YOB: 1965   Date of Visit: 2/9/2021       To Whom it May Concern:    Lester Stock was seen in my clinic on 2/8/21. The Point of care testing came back negative for Covid-19 infection. He may return to work once the send out test comes back as long as the test is negative. If you have any questions or concerns, please don't hesitate to call.     Sincerely,         Mayra Wetzel MD

## 2021-02-09 NOTE — TELEPHONE ENCOUNTER
Patient called and would like to speak to the office about his Negative Covid test from 02/08. He needs a copy for his Employer. He is still coughing. Please contact patient.

## 2021-02-09 NOTE — TELEPHONE ENCOUNTER
Send out test is still pending. I let pt know that once it is back we can release to work. He was going to call his employer to let them know.

## 2021-02-09 NOTE — LETTER
[facility] PRE [facility]  No information on file. Max Franklin MD        February 11, 2021     Patient: Georgie Joyce   YOB: 1965   Date of Visit: 2/8/2021       To Whom it May Concern:    Georgie Joyce was seen in my clinic on 2/8/21. All testing has returned negative for Covid-19. He may return to work on 2/11/21. If you have any questions or concerns, please don't hesitate to call.     Sincerely,         Max Franklin MD

## 2021-02-10 LAB
SARS-COV-2: NOT DETECTED
SOURCE: NORMAL

## 2021-02-11 NOTE — TELEPHONE ENCOUNTER
Send out came back negative. Pt has been informed. He will be in to  a slip for work. Letter has been taken up front.

## 2021-02-25 ENCOUNTER — OFFICE VISIT (OUTPATIENT)
Dept: CHIROPRACTIC MEDICINE | Age: 56
End: 2021-02-25
Payer: COMMERCIAL

## 2021-02-25 VITALS
OXYGEN SATURATION: 96 % | TEMPERATURE: 98 F | HEART RATE: 109 BPM | HEIGHT: 70 IN | BODY MASS INDEX: 38.37 KG/M2 | RESPIRATION RATE: 16 BRPM | WEIGHT: 268 LBS

## 2021-02-25 DIAGNOSIS — M54.50 CHRONIC BILATERAL LOW BACK PAIN WITHOUT SCIATICA: Primary | ICD-10-CM

## 2021-02-25 DIAGNOSIS — G89.29 CHRONIC BILATERAL LOW BACK PAIN WITHOUT SCIATICA: Primary | ICD-10-CM

## 2021-02-25 LAB
AVERAGE GLUCOSE: NORMAL
HBA1C MFR BLD: 8.3 %

## 2021-02-25 PROCEDURE — G8417 CALC BMI ABV UP PARAM F/U: HCPCS | Performed by: CHIROPRACTOR

## 2021-02-25 PROCEDURE — 99213 OFFICE O/P EST LOW 20 MIN: CPT | Performed by: CHIROPRACTOR

## 2021-02-25 PROCEDURE — 97014 ELECTRIC STIMULATION THERAPY: CPT | Performed by: CHIROPRACTOR

## 2021-02-25 PROCEDURE — G8484 FLU IMMUNIZE NO ADMIN: HCPCS | Performed by: CHIROPRACTOR

## 2021-02-25 PROCEDURE — 3017F COLORECTAL CA SCREEN DOC REV: CPT | Performed by: CHIROPRACTOR

## 2021-02-25 PROCEDURE — 98940 CHIROPRACT MANJ 1-2 REGIONS: CPT | Performed by: CHIROPRACTOR

## 2021-02-25 PROCEDURE — 1036F TOBACCO NON-USER: CPT | Performed by: CHIROPRACTOR

## 2021-02-25 PROCEDURE — G8427 DOCREV CUR MEDS BY ELIG CLIN: HCPCS | Performed by: CHIROPRACTOR

## 2021-04-29 ENCOUNTER — OFFICE VISIT (OUTPATIENT)
Dept: FAMILY MEDICINE CLINIC | Age: 56
End: 2021-04-29
Payer: COMMERCIAL

## 2021-04-29 VITALS
DIASTOLIC BLOOD PRESSURE: 74 MMHG | WEIGHT: 267 LBS | OXYGEN SATURATION: 94 % | HEART RATE: 102 BPM | TEMPERATURE: 97.5 F | BODY MASS INDEX: 38.31 KG/M2 | SYSTOLIC BLOOD PRESSURE: 128 MMHG

## 2021-04-29 DIAGNOSIS — J01.10 ACUTE NON-RECURRENT FRONTAL SINUSITIS: Primary | ICD-10-CM

## 2021-04-29 PROCEDURE — 1036F TOBACCO NON-USER: CPT | Performed by: FAMILY MEDICINE

## 2021-04-29 PROCEDURE — 99213 OFFICE O/P EST LOW 20 MIN: CPT | Performed by: FAMILY MEDICINE

## 2021-04-29 PROCEDURE — G8417 CALC BMI ABV UP PARAM F/U: HCPCS | Performed by: FAMILY MEDICINE

## 2021-04-29 PROCEDURE — G8427 DOCREV CUR MEDS BY ELIG CLIN: HCPCS | Performed by: FAMILY MEDICINE

## 2021-04-29 PROCEDURE — 3017F COLORECTAL CA SCREEN DOC REV: CPT | Performed by: FAMILY MEDICINE

## 2021-04-29 RX ORDER — DAPAGLIFLOZIN 10 MG/1
TABLET, FILM COATED ORAL
COMMUNITY
Start: 2021-02-02

## 2021-04-29 RX ORDER — CEFDINIR 300 MG/1
300 CAPSULE ORAL 2 TIMES DAILY
Qty: 20 CAPSULE | Refills: 0 | Status: SHIPPED | OUTPATIENT
Start: 2021-04-29 | End: 2021-05-09

## 2021-04-29 RX ORDER — INSULIN GLARGINE 100 [IU]/ML
INJECTION, SOLUTION SUBCUTANEOUS
COMMUNITY
End: 2022-02-22 | Stop reason: CLARIF

## 2021-04-29 RX ORDER — CETIRIZINE HYDROCHLORIDE 10 MG/1
10 TABLET ORAL DAILY
Qty: 30 TABLET | Refills: 1 | Status: SHIPPED | OUTPATIENT
Start: 2021-04-29 | End: 2022-09-01

## 2021-04-29 RX ORDER — DULAGLUTIDE 3 MG/.5ML
INJECTION, SOLUTION SUBCUTANEOUS
COMMUNITY
End: 2022-03-14 | Stop reason: DRUGHIGH

## 2021-04-29 ASSESSMENT — ENCOUNTER SYMPTOMS
EYES NEGATIVE: 1
RHINORRHEA: 1
SINUS PRESSURE: 1
SORE THROAT: 1
RESPIRATORY NEGATIVE: 1
SINUS PAIN: 1

## 2021-04-29 NOTE — PROGRESS NOTES
21  [facility] : 1965 Sex: male  Age: 48 y.o. Chief Complaint   Patient presents with    Sinus Problem    Pharyngitis       This patient is a 64-year-old male who complains of sinus congestion and sore throat no fevers chills nausea vomiting diarrhea no loss of taste or smell he has been immunized for COVID-19. Review of Systems   HENT: Positive for congestion, postnasal drip, rhinorrhea, sinus pressure, sinus pain, sneezing and sore throat. Eyes: Negative. Respiratory: Negative. Cardiovascular: Negative.           Current Outpatient Medications:     FARXIGA 10 MG tablet, TAKE 1 TABLET BY MOUTH EVERY DAY, Disp: , Rfl:     Dulaglutide (TRULICITY) 3 MG/5.2HJ SOPN, Trulicity 3 CV/7.2 mL subcutaneous pen injector  INJECT 3 MG BY SUBCUTANEOUS ROUTE ONCE WEEKLY, Disp: , Rfl:     insulin glargine (BASAGLAR KWIKPEN) 100 UNIT/ML injection pen, Basaglar KwikPen U-100 Insulin 100 unit/mL (3 mL) subcutaneous  Inject 32 units every day by subcutaneous route., Disp: , Rfl:     cetirizine (ZYRTEC) 10 MG tablet, Take 1 tablet by mouth daily, Disp: 30 tablet, Rfl: 1    cefdinir (OMNICEF) 300 MG capsule, Take 1 capsule by mouth 2 times daily for 10 days, Disp: 20 capsule, Rfl: 0    diclofenac sodium (VOLTAREN) 1 % GEL, Apply 2 g topically 2 times daily, Disp: 50 g, Rfl: 3    empagliflozin (JARDIANCE) 25 MG tablet, Take 25 mg by mouth daily, Disp: , Rfl:     lisinopril (PRINIVIL;ZESTRIL) 20 MG tablet, Take 1 tablet by mouth 2 times daily, Disp: 180 tablet, Rfl: 1    loperamide (IMODIUM) 2 MG capsule, Take 2 mg by mouth 4 times daily as needed for Diarrhea, Disp: , Rfl:     NAPROXEN SODIUM PO, Take by mouth as needed (knee pain), Disp: , Rfl:     albuterol sulfate  (90 Base) MCG/ACT inhaler, Inhale 2 puffs into the lungs 4 times daily as needed for Wheezing Every 4-6 hours as needed, Disp: 1 Inhaler, Rfl: 5    atorvastatin (LIPITOR) 40 MG tablet, Take 40 mg by mouth daily, Disp: , Rfl:     insulin glargine (BASAGLAR KWIKPEN) 100 UNIT/ML injection pen, Inject 31 Units into the skin every morning, Disp: , Rfl:     TRULICITY 1.5 UF/2.7AS SOPN, 1.5 mg by Subcutaneous Infusion route every 7 days, Disp: , Rfl: 1    metFORMIN (GLUCOPHAGE) 850 MG tablet, Take 850 mg by mouth 3 times daily , Disp: , Rfl:     glipiZIDE (GLUCOTROL) 5 MG tablet, Take 20 mg by mouth Taking 4 tabs q am, Disp: , Rfl:   Allergies   Allergen Reactions    No Known Allergies        Past Medical History:   Diagnosis Date    Abdominal pain     Ankle arthralgia     Depression     Diarrhea     Diffuse cervicobrachial syndrome     Hyperlipidemia     Hypertension     Low back pain     Neck pain     Neck sprain     Psychosexual dysfunction associated with inhibited libido     Spasm     Type 2 diabetes mellitus without complication [facility])      Past Surgical History:   Procedure Laterality Date    COLONOSCOPY      2017 normal     SHOULDER ARTHROSCOPY Right     VASECTOMY  2017     No family history on file. Social History     Tobacco Use    Smoking status: Former Smoker     Years: 20.00     Quit date: 2020     Years since quittin.7    Smokeless tobacco: Former User     Types: Chew    Tobacco comment: 5-6 cigarettes qd, ocassional cigar   Substance Use Topics    Alcohol use: Yes     Alcohol/week: 4.0 standard drinks     Types: 4 Cans of beer per week    Drug use: No        Vitals:    21 0852   BP: 128/74   Pulse: 102   Temp: 97.5 °F (36.4 °C)   SpO2: 94%   Weight: 267 lb (121.1 kg)       Physical Exam  Vitals signs reviewed. Constitutional:       Appearance: Normal appearance. HENT:      Head: Normocephalic and atraumatic. Right Ear: Tympanic membrane, ear canal and external ear normal. There is no impacted cerumen. Left Ear: Tympanic membrane, ear canal and external ear normal. There is no impacted cerumen. Nose: Congestion and rhinorrhea present.       Mouth/Throat: Mouth: Mucous membranes are moist.      Pharynx: Oropharynx is clear. Posterior oropharyngeal erythema present. No oropharyngeal exudate. Eyes:      Conjunctiva/sclera: Conjunctivae normal.   Neck:      Musculoskeletal: Normal range of motion and neck supple. Cardiovascular:      Rate and Rhythm: Normal rate and regular rhythm. Heart sounds: No murmur. Pulmonary:      Effort: Pulmonary effort is normal.      Breath sounds: Normal breath sounds. Neurological:      Mental Status: He is alert. Assessment and Plan:  Bonilla Horvath was seen today for sinus problem and pharyngitis. Diagnoses and all orders for this visit:    Acute non-recurrent frontal sinusitis    Other orders  -     cetirizine (ZYRTEC) 10 MG tablet; Take 1 tablet by mouth daily  -     cefdinir (OMNICEF) 300 MG capsule; Take 1 capsule by mouth 2 times daily for 10 days        Orders Placed This Encounter   Medications    cetirizine (ZYRTEC) 10 MG tablet     Sig: Take 1 tablet by mouth daily     Dispense:  30 tablet     Refill:  1    cefdinir (OMNICEF) 300 MG capsule     Sig: Take 1 capsule by mouth 2 times daily for 10 days     Dispense:  20 capsule     Refill:  0        Patient advised to follow up with PCP as needed. Seen By:  Lorraine Jean Baptiste, DO  Follow-up with his PCP.

## 2021-05-11 ENCOUNTER — TELEPHONE (OUTPATIENT)
Dept: ADMINISTRATIVE | Age: 56
End: 2021-05-11

## 2021-05-11 RX ORDER — METHYLPREDNISOLONE 4 MG/1
TABLET ORAL
Qty: 1 KIT | Refills: 0 | Status: SHIPPED | OUTPATIENT
Start: 2021-05-11 | End: 2021-05-17

## 2021-05-11 NOTE — TELEPHONE ENCOUNTER
Options would include potentially urgent care    Otherwise we can see if there is any potential openings before then

## 2021-05-11 NOTE — TELEPHONE ENCOUNTER
We had a cancellation on our scheduled for tomorrow at 1PM. I called Daren to see if he still wanted to be seen. Unfortunately, he needs an appt fist thing in the morning or late in the day. He saw Dr Halley Oliver previously and Dr Halley Oliver wanted to try an injection. Marbin Trinidad said that his insurance does not cover the injection and it cost thousands of dollars. He has made an appointment with Dr Michelle Parsons but the appointment is in June. Marbin Trinidad would like to know if you will prescribe an oral steroid. Please advise.

## 2021-05-11 NOTE — TELEPHONE ENCOUNTER
I called and left a detailed message for Chuck Daly. I offered to place him on the cancellation list for Dr Chandra Hoyos, also advised [facility] if he wanted to see another provider. He has seen Ortho for knee pain in the past - not sure if he reached out to their office. Other options include [facility] or scheduling a follow up w/ Dr Chandra Hoyos when he will be back in town.

## 2021-05-11 NOTE — TELEPHONE ENCOUNTER
Per verbal from Dr Светлана ramirez to call in a dose pack. Left message to update patient.      Thanks

## 2021-05-11 NOTE — TELEPHONE ENCOUNTER
Pt called for an appt for his knee pain. He is going out of town on 5/22 and wanted an appt before that. There are no openings in Dr. Landrum File schedule for weeks. Please contact pt with further instructions/sooner appt.

## 2021-05-11 NOTE — TELEPHONE ENCOUNTER
Requested Prescriptions     Signed Prescriptions Disp Refills    methylPREDNISolone (MEDROL DOSEPACK) 4 MG tablet 1 kit 0     Sig: Take by mouth.      Authorizing Provider: Pratima Peralta

## 2021-05-19 ENCOUNTER — OFFICE VISIT (OUTPATIENT)
Dept: FAMILY MEDICINE CLINIC | Age: 56
End: 2021-05-19
Payer: COMMERCIAL

## 2021-05-19 VITALS
SYSTOLIC BLOOD PRESSURE: 130 MMHG | BODY MASS INDEX: 37.65 KG/M2 | HEIGHT: 70 IN | WEIGHT: 263 LBS | TEMPERATURE: 97.9 F | OXYGEN SATURATION: 98 % | DIASTOLIC BLOOD PRESSURE: 78 MMHG | HEART RATE: 78 BPM

## 2021-05-19 DIAGNOSIS — M25.561 CHRONIC PAIN OF BOTH KNEES: Primary | ICD-10-CM

## 2021-05-19 DIAGNOSIS — M25.562 CHRONIC PAIN OF BOTH KNEES: Primary | ICD-10-CM

## 2021-05-19 DIAGNOSIS — G89.29 CHRONIC PAIN OF BOTH KNEES: Primary | ICD-10-CM

## 2021-05-19 PROCEDURE — G8427 DOCREV CUR MEDS BY ELIG CLIN: HCPCS | Performed by: FAMILY MEDICINE

## 2021-05-19 PROCEDURE — 96372 THER/PROPH/DIAG INJ SC/IM: CPT | Performed by: FAMILY MEDICINE

## 2021-05-19 PROCEDURE — 1036F TOBACCO NON-USER: CPT | Performed by: FAMILY MEDICINE

## 2021-05-19 PROCEDURE — 99213 OFFICE O/P EST LOW 20 MIN: CPT | Performed by: FAMILY MEDICINE

## 2021-05-19 PROCEDURE — 3017F COLORECTAL CA SCREEN DOC REV: CPT | Performed by: FAMILY MEDICINE

## 2021-05-19 PROCEDURE — G8417 CALC BMI ABV UP PARAM F/U: HCPCS | Performed by: FAMILY MEDICINE

## 2021-05-19 RX ORDER — PREDNISONE 20 MG/1
20 TABLET ORAL 2 TIMES DAILY
Qty: 10 TABLET | Refills: 0 | Status: SHIPPED | OUTPATIENT
Start: 2021-05-19 | End: 2021-05-24

## 2021-05-19 RX ORDER — METHYLPREDNISOLONE ACETATE 80 MG/ML
80 INJECTION, SUSPENSION INTRA-ARTICULAR; INTRALESIONAL; INTRAMUSCULAR; SOFT TISSUE ONCE
Status: COMPLETED | OUTPATIENT
Start: 2021-05-19 | End: 2021-05-19

## 2021-05-19 RX ADMIN — METHYLPREDNISOLONE ACETATE 80 MG: 80 INJECTION, SUSPENSION INTRA-ARTICULAR; INTRALESIONAL; INTRAMUSCULAR; SOFT TISSUE at 09:04

## 2021-05-19 ASSESSMENT — ENCOUNTER SYMPTOMS
NAUSEA: 0
BACK PAIN: 0
DIARRHEA: 0
SHORTNESS OF BREATH: 0
SORE THROAT: 0
COUGH: 0
CONSTIPATION: 0
PHOTOPHOBIA: 0
BLOOD IN STOOL: 0
ABDOMINAL PAIN: 0
VOMITING: 0

## 2021-05-19 NOTE — PROGRESS NOTES
other systems reviewed and are negative.            Current Outpatient Medications:     predniSONE (DELTASONE) 20 MG tablet, Take 1 tablet by mouth 2 times daily for 5 days, Disp: 10 tablet, Rfl: 0    FARXIGA 10 MG tablet, TAKE 1 TABLET BY MOUTH EVERY DAY, Disp: , Rfl:     Dulaglutide (TRULICITY) 3 QG/0.6NV SOPN, Trulicity 3 CR/2.4 mL subcutaneous pen injector  INJECT 3 MG BY SUBCUTANEOUS ROUTE ONCE WEEKLY, Disp: , Rfl:     insulin glargine (BASAGLAR KWIKPEN) 100 UNIT/ML injection pen, Basaglar KwikPen U-100 Insulin 100 unit/mL (3 mL) subcutaneous  Inject 32 units every day by subcutaneous route., Disp: , Rfl:     cetirizine (ZYRTEC) 10 MG tablet, Take 1 tablet by mouth daily, Disp: 30 tablet, Rfl: 1    diclofenac sodium (VOLTAREN) 1 % GEL, Apply 2 g topically 2 times daily, Disp: 50 g, Rfl: 3    empagliflozin (JARDIANCE) 25 MG tablet, Take 25 mg by mouth daily, Disp: , Rfl:     lisinopril (PRINIVIL;ZESTRIL) 20 MG tablet, Take 1 tablet by mouth 2 times daily, Disp: 180 tablet, Rfl: 1    loperamide (IMODIUM) 2 MG capsule, Take 2 mg by mouth 4 times daily as needed for Diarrhea, Disp: , Rfl:     NAPROXEN SODIUM PO, Take by mouth as needed (knee pain), Disp: , Rfl:     albuterol sulfate  (90 Base) MCG/ACT inhaler, Inhale 2 puffs into the lungs 4 times daily as needed for Wheezing Every 4-6 hours as needed, Disp: 1 Inhaler, Rfl: 5    atorvastatin (LIPITOR) 40 MG tablet, Take 40 mg by mouth daily, Disp: , Rfl:     insulin glargine (BASAGLAR KWIKPEN) 100 UNIT/ML injection pen, Inject 31 Units into the skin every morning, Disp: , Rfl:     TRULICITY 1.5 IA/1.3NY SOPN, 1.5 mg by Subcutaneous Infusion route every 7 days, Disp: , Rfl: 1    metFORMIN (GLUCOPHAGE) 850 MG tablet, Take 850 mg by mouth 3 times daily , Disp: , Rfl:     glipiZIDE (GLUCOTROL) 5 MG tablet, Take 20 mg by mouth Taking 4 tabs q am, Disp: , Rfl:    Patient Active Problem List   Diagnosis    Mixed hyperlipidemia    Benign essential hypertension    Type 2 diabetes mellitus (HCC)    Generalized osteoarthritis    Tobacco use    Erectile dysfunction    Chronic allergic rhinitis    Acute pain of right shoulder    Upper back pain on left side    Tachycardia    Right ankle sprain    Effusion of left knee    Chronic pain of both knees     Past Medical History:   Diagnosis Date    Abdominal pain     Ankle arthralgia     Depression     Diarrhea     Diffuse cervicobrachial syndrome     Hyperlipidemia     Hypertension     Low back pain     Neck pain     Neck sprain     Psychosexual dysfunction associated with inhibited libido     Spasm     Type 2 diabetes mellitus without complication (HCC)      Past Surgical History:   Procedure Laterality Date    COLONOSCOPY      2017 normal     SHOULDER ARTHROSCOPY Right     VASECTOMY  2017     Social History     Socioeconomic History    Marital status:      Spouse name: Krysten    Number of children: 2    Years of education: 13    Highest education level: Some college, no degree   Occupational History    Occupation: [employer]   Tobacco Use    Smoking status: Former Smoker     Years: 20.00     Quit date: 2020     Years since quittin.7    Smokeless tobacco: Former User     Types: Chew    Tobacco comment: 5-6 cigarettes qd, ocassional cigar   Vaping Use    Vaping Use: Never used   Substance and Sexual Activity    Alcohol use:  Yes     Alcohol/week: 4.0 standard drinks     Types: 4 Cans of beer per week    Drug use: No    Sexual activity: Not on file   Other Topics Concern    Not on file   Social History Narrative    Not on file     Social Determinants of Health     Financial Resource Strain: Low Risk     Difficulty of Paying Living Expenses: Not very hard   Food Insecurity: No Food Insecurity    Worried About Running Out of Food in the Last Year: Never true    Leobardo of Food in the Last Year: Never true   Transportation Needs: No Transportation Needs    Lack of Transportation (Medical): No    Lack of Transportation (Non-Medical): No   Physical Activity:     Days of Exercise per Week:     Minutes of Exercise per Session:    Stress:     Feeling of Stress :    Social Connections:     Frequency of Communication with Friends and Family:     Frequency of Social Gatherings with Friends and Family:     Attends Quaker Services:     Active Member of Clubs or Organizations:     Attends Club or Organization Meetings:     Marital Status:    Intimate Partner Violence:     Fear of Current or Ex-Partner:     Emotionally Abused:     Physically Abused:     Sexually Abused:      History reviewed. No pertinent family history. There are no preventive care reminders to display for this patient. There are no preventive care reminders to display for this patient. Diabetes Management   Topic Date Due    Diabetic foot exam  Never done    Diabetic retinal exam  Never done    Diabetic microalbuminuria test  12/18/2020      Health Maintenance Due   Topic    Shingles Vaccine (1 of 2)    DTaP/Tdap/Td vaccine (2 - Td)      Health Maintenance   Topic Date Due    Hepatitis C screen  Never done    Pneumococcal 0-64 years Vaccine (1 of 2 - PPSV23) Never done    Diabetic foot exam  Never done    Diabetic retinal exam  Never done    HIV screen  Never done    Shingles Vaccine (1 of 2) Never done    DTaP/Tdap/Td vaccine (2 - Td) 06/01/2019    Diabetic microalbuminuria test  12/18/2020    Flu vaccine (Season Ended) 09/01/2021    Lipid screen  10/27/2021    Potassium monitoring  10/27/2021    Creatinine monitoring  10/27/2021    A1C test (Diabetic or Prediabetic)  02/25/2022    Colon cancer screen colonoscopy  12/08/2027    Hepatitis B vaccine  Completed    COVID-19 Vaccine  Completed    Hepatitis A vaccine  Aged Out    Hib vaccine  Aged Out    Meningococcal (ACWY) vaccine  Aged Out      There are no preventive care reminders to display for this patient. There are no preventive care reminders to display for this patient. /78 (Site: Right Upper Arm)   Pulse 78   Temp 97.9 °F (36.6 °C)   Ht 5' 10\" (1.778 m)   Wt 263 lb (119.3 kg)   SpO2 98%   BMI 37.74 kg/m²       Objective   Physical Exam  HENT:      Head: Normocephalic and atraumatic. Eyes:      General: No scleral icterus. Conjunctiva/sclera: Conjunctivae normal.      Pupils: Pupils are equal, round, and reactive to light. Neck:      Thyroid: No thyromegaly. Cardiovascular:      Rate and Rhythm: Normal rate and regular rhythm. Heart sounds: Normal heart sounds. No murmur heard. Pulmonary:      Effort: Pulmonary effort is normal.      Breath sounds: Normal breath sounds. No rales. Abdominal:      General: Bowel sounds are normal. There is no distension. Palpations: Abdomen is soft. Tenderness: There is no abdominal tenderness. Musculoskeletal:         General: Swelling and tenderness present. Cervical back: Neck supple. Right knee: Effusion and bony tenderness present. No lacerations. Decreased range of motion. Tenderness present. Left knee: Effusion present. Decreased range of motion. Tenderness present. Lymphadenopathy:      Cervical: No cervical adenopathy. Skin:     General: Skin is warm and dry. Findings: No erythema or rash. Neurological:      Mental Status: He is alert and oriented to person, place, and time. Cranial Nerves: No cranial nerve deficit. Psychiatric:         Judgment: Judgment normal.                  An electronic signature was used to authenticate this note.     --Hiro Mckeon DO

## 2021-05-30 ENCOUNTER — HOSPITAL ENCOUNTER (EMERGENCY)
Age: 56
Discharge: HOME | End: 2021-05-30
Attending: EMERGENCY MEDICINE
Payer: COMMERCIAL

## 2021-05-30 ENCOUNTER — APPOINTMENT (OUTPATIENT)
Dept: GENERAL RADIOLOGY | Age: 56
End: 2021-05-30
Payer: COMMERCIAL

## 2021-05-30 VITALS
OXYGEN SATURATION: 97 % | DIASTOLIC BLOOD PRESSURE: 94 MMHG | HEART RATE: 111 BPM | TEMPERATURE: 97.1 F | HEIGHT: 70 IN | WEIGHT: 260 LBS | BODY MASS INDEX: 37.22 KG/M2 | SYSTOLIC BLOOD PRESSURE: 145 MMHG | RESPIRATION RATE: 16 BRPM

## 2021-05-30 DIAGNOSIS — S92.354A NONDISPLACED FRACTURE OF FIFTH METATARSAL BONE, RIGHT FOOT, INITIAL ENCOUNTER FOR CLOSED FRACTURE: Primary | ICD-10-CM

## 2021-05-30 PROCEDURE — 6370000000 HC RX 637 (ALT 250 FOR IP): Performed by: PHYSICIAN ASSISTANT

## 2021-05-30 PROCEDURE — 29515 APPLICATION SHORT LEG SPLINT: CPT

## 2021-05-30 PROCEDURE — 99283 EMERGENCY DEPT VISIT LOW MDM: CPT

## 2021-05-30 PROCEDURE — 73630 X-RAY EXAM OF FOOT: CPT

## 2021-05-30 RX ORDER — OXYCODONE HYDROCHLORIDE AND ACETAMINOPHEN 5; 325 MG/1; MG/1
1 TABLET ORAL ONCE
Status: COMPLETED | OUTPATIENT
Start: 2021-05-30 | End: 2021-05-30

## 2021-05-30 RX ORDER — OXYCODONE HYDROCHLORIDE AND ACETAMINOPHEN 5; 325 MG/1; MG/1
1 TABLET ORAL EVERY 8 HOURS PRN
Qty: 9 TABLET | Refills: 0 | Status: SHIPPED | OUTPATIENT
Start: 2021-05-30 | End: 2021-06-02

## 2021-05-30 RX ORDER — NAPROXEN 500 MG/1
500 TABLET ORAL 2 TIMES DAILY
Qty: 14 TABLET | Refills: 0 | Status: SHIPPED | OUTPATIENT
Start: 2021-05-30 | End: 2022-03-14

## 2021-05-30 RX ADMIN — OXYCODONE HYDROCHLORIDE AND ACETAMINOPHEN 1 TABLET: 5; 325 TABLET ORAL at 09:10

## 2021-05-30 ASSESSMENT — PAIN SCALES - GENERAL: PAINLEVEL_OUTOF10: 10

## 2021-05-30 ASSESSMENT — PAIN DESCRIPTION - PAIN TYPE: TYPE: ACUTE PAIN

## 2021-05-30 ASSESSMENT — PAIN DESCRIPTION - LOCATION: LOCATION: FOOT

## 2021-05-30 ASSESSMENT — PAIN DESCRIPTION - ORIENTATION: ORIENTATION: RIGHT

## 2021-05-30 NOTE — ED PROVIDER NOTES
[facility]  Department of Emergency Medicine   ED  Encounter Note  Admit Date/RoomTime: 2021  8:46 AM  ED Room:     NAME: Buzz Castillo  : 1965  MRN: [id number]     Chief Complaint:  Foot Injury (right)    History of Present Illness         Buzz Castillo is a 48 y.o. old male presenting to the emergency department by private vehicle, for persistent right foot pain after injury 4 days ago. Patient states he stepped on uneven pavement which caused him to twist his foot. Patient states symptoms are mild in severity describes as an aching pain. Patient states he tried a Norco at home which did not improve his pain. Patient states the pain is worse with ambulation. Patient states he had a previous fifth metatarsal fracture. Denies fever/chills, headache, vision change, dizziness, chest pain, dyspnea, abdominal pain, NVD, numbness/weakness. ROS   Pertinent positives and negatives are stated within HPI, all other systems reviewed and are negative. Past Medical History:  has a past medical history of Abdominal pain, Ankle arthralgia, Depression, Diarrhea, Diffuse cervicobrachial syndrome, Hyperlipidemia, Hypertension, Low back pain, Neck pain, Neck sprain, Psychosexual dysfunction associated with inhibited libido, Spasm, and Type 2 diabetes mellitus without complication ([address] Surgical History:  has a past surgical history that includes Vasectomy (2017); Shoulder arthroscopy (Right); and Colonoscopy. Social History:  reports that he quit smoking about 9 months ago. He quit after 20.00 years of use. He has quit using smokeless tobacco.  His smokeless tobacco use included chew. He reports current alcohol use of about 4.0 standard drinks of alcohol per week. He reports that he does not use drugs. Family History: family history is not on file.      Allergies: No known allergies    Physical Exam   Oxygen Saturation Interpretation: Normal. ED Triage Vitals [05/30/21 0844]   BP Temp Temp src Pulse Resp SpO2 Height Weight   (!) 145/94 97.1 °F (36.2 °C) -- 111 16 97 % 5' 10\" (1.778 m) 260 lb (117.9 kg)         Constitutional:  Alert, development consistent with age. Neck:  Normal ROM. Supple. Foot: Right lateral 5th metatarsal(s). Tenderness:  mild. Swelling: Mild. Deformity: no.              ROM: full range of motion. Skin:  no erythema, rash or wounds noted. Neurovascular: Motor deficit: none. Sensory deficit:   none. Pulse deficit: none. Capillary refill: normal.  Ankle:               Tenderness:  none. Swelling: None. Deformity: no.             ROM: full range of motion. Skin:  normal exam; no wounds, erythema, or swelling. Gait:  Not assessed due to pain. Lymphatics: No lymphangitis or adenopathy noted. Neurological:  Oriented. Motor functions intact. Lab / Imaging Results   (All laboratory and radiology results have been personally reviewed by myself)  Labs:  No results found for this visit on 05/30/21. Imaging: All Radiology results interpreted by Radiologist unless otherwise noted. XR FOOT RIGHT (MIN 3 VIEWS)   Final Result   Nondisplaced fracture involving the proximal shaft of the 5th metatarsal.           ED Course / Medical Decision Making     Medications   oxyCODONE-acetaminophen (PERCOCET) 5-325 MG per tablet 1 tablet (1 tablet Oral Given 5/30/21 0910)        Consult(s):   none. Procedure(s):   none    MDM:     Patient presenting with right foot pain. Patient is in no acute distress, afebrile, nontoxic appearance. Patient x-ray showing a nondisplaced fracture of the proximal shaft of the fifth metatarsal.  Patient placed in a posterior splint and given crutches to be nonweightbearing. Patient has a follow-up appointment with his orthopedic doctor on Tuesday.   Discussed supportive care with patient. Recommended patient return to the ED with new or worsening of symptoms. Plan of Care/Counseling:  I reviewed today's visit with the patient in addition to providing specific details for the plan of care and counseling regarding the diagnosis and prognosis. Questions are answered at this time and are agreeable with the plan. Assessment      1. Nondisplaced fracture of fifth metatarsal bone, right foot, initial encounter for closed fracture      Plan   Discharge home. Patient condition is stable    New Medications     Discharge Medication List as of 5/30/2021  9:49 AM      START taking these medications    Details   oxyCODONE-acetaminophen (PERCOCET) 5-325 MG per tablet Take 1 tablet by mouth every 8 hours as needed for Pain for up to 3 days. Intended supply: 3 days. Take lowest dose possible to manage pain, Disp-9 tablet, R-0Print      naproxen (NAPROSYN) 500 MG tablet Take 1 tablet by mouth 2 times daily for 7 days, Disp-14 tablet, R-0Print           Electronically signed by Mary Young PA-C   DD: 5/30/21  **This report was transcribed using voice recognition software. Every effort was made to ensure accuracy; however, inadvertent computerized transcription errors may be present.   END OF ED PROVIDER NOTE     Mary Young PA-C  05/30/21 8696

## 2021-06-01 LAB
AVERAGE GLUCOSE: NORMAL
HBA1C MFR BLD: 8.1 %

## 2021-06-15 ENCOUNTER — HOSPITAL ENCOUNTER (OUTPATIENT)
Dept: GENERAL RADIOLOGY | Age: 56
Discharge: HOME | End: 2021-06-17
Payer: COMMERCIAL

## 2021-06-15 ENCOUNTER — HOSPITAL ENCOUNTER (OUTPATIENT)
Age: 56
Discharge: HOME | End: 2021-06-17
Payer: COMMERCIAL

## 2021-06-15 DIAGNOSIS — S92.354A NONDISP FX OF FIFTH METATARSAL BONE, RIGHT FOOT, INIT: ICD-10-CM

## 2021-06-15 PROCEDURE — 73630 X-RAY EXAM OF FOOT: CPT

## 2021-07-06 ENCOUNTER — HOSPITAL ENCOUNTER (OUTPATIENT)
Age: 56
Discharge: HOME | End: 2021-07-08
Payer: COMMERCIAL

## 2021-07-06 ENCOUNTER — HOSPITAL ENCOUNTER (OUTPATIENT)
Dept: GENERAL RADIOLOGY | Age: 56
Discharge: HOME | End: 2021-07-08
Payer: COMMERCIAL

## 2021-07-06 DIAGNOSIS — S92.354D CLOSED NONDISPLACED FRACTURE OF FIFTH METATARSAL BONE OF RIGHT FOOT WITH ROUTINE HEALING, SUBSEQUENT ENCOUNTER: ICD-10-CM

## 2021-07-06 PROCEDURE — 73630 X-RAY EXAM OF FOOT: CPT

## 2021-07-27 ENCOUNTER — HOSPITAL ENCOUNTER (OUTPATIENT)
Dept: GENERAL RADIOLOGY | Age: 56
Discharge: HOME | End: 2021-07-29
Payer: COMMERCIAL

## 2021-07-27 ENCOUNTER — HOSPITAL ENCOUNTER (OUTPATIENT)
Age: 56
Discharge: HOME | End: 2021-07-29
Payer: COMMERCIAL

## 2021-07-27 DIAGNOSIS — S92.354A NONDISP FX OF FIFTH METATARSAL BONE, RIGHT FOOT, INIT: ICD-10-CM

## 2021-07-27 PROCEDURE — 73630 X-RAY EXAM OF FOOT: CPT

## 2021-07-29 ENCOUNTER — OFFICE VISIT (OUTPATIENT)
Dept: FAMILY MEDICINE CLINIC | Age: 56
End: 2021-07-29
Payer: COMMERCIAL

## 2021-07-29 VITALS
OXYGEN SATURATION: 97 % | TEMPERATURE: 97.4 F | HEART RATE: 98 BPM | DIASTOLIC BLOOD PRESSURE: 100 MMHG | SYSTOLIC BLOOD PRESSURE: 150 MMHG

## 2021-07-29 DIAGNOSIS — M25.562 CHRONIC PAIN OF BOTH KNEES: ICD-10-CM

## 2021-07-29 DIAGNOSIS — M25.561 CHRONIC PAIN OF BOTH KNEES: ICD-10-CM

## 2021-07-29 DIAGNOSIS — E11.9 TYPE 2 DIABETES MELLITUS WITHOUT COMPLICATION, WITH LONG-TERM CURRENT USE OF INSULIN (HCC): ICD-10-CM

## 2021-07-29 DIAGNOSIS — S92.501S CLOSED FRACTURE OF PHALANX OF RIGHT FIFTH TOE, SEQUELA: ICD-10-CM

## 2021-07-29 DIAGNOSIS — G89.29 CHRONIC PAIN OF BOTH KNEES: ICD-10-CM

## 2021-07-29 DIAGNOSIS — Z72.0 TOBACCO USE: ICD-10-CM

## 2021-07-29 DIAGNOSIS — J30.9 CHRONIC ALLERGIC RHINITIS: ICD-10-CM

## 2021-07-29 DIAGNOSIS — N52.9 ERECTILE DYSFUNCTION, UNSPECIFIED ERECTILE DYSFUNCTION TYPE: ICD-10-CM

## 2021-07-29 DIAGNOSIS — E78.2 MIXED HYPERLIPIDEMIA: ICD-10-CM

## 2021-07-29 DIAGNOSIS — I10 BENIGN ESSENTIAL HYPERTENSION: Primary | ICD-10-CM

## 2021-07-29 DIAGNOSIS — R00.0 TACHYCARDIA: ICD-10-CM

## 2021-07-29 DIAGNOSIS — Z79.4 TYPE 2 DIABETES MELLITUS WITHOUT COMPLICATION, WITH LONG-TERM CURRENT USE OF INSULIN (HCC): ICD-10-CM

## 2021-07-29 PROCEDURE — 3052F HG A1C>EQUAL 8.0%<EQUAL 9.0%: CPT | Performed by: INTERNAL MEDICINE

## 2021-07-29 PROCEDURE — 99213 OFFICE O/P EST LOW 20 MIN: CPT | Performed by: INTERNAL MEDICINE

## 2021-07-29 PROCEDURE — 2022F DILAT RTA XM EVC RTNOPTHY: CPT | Performed by: INTERNAL MEDICINE

## 2021-07-29 PROCEDURE — G8427 DOCREV CUR MEDS BY ELIG CLIN: HCPCS | Performed by: INTERNAL MEDICINE

## 2021-07-29 PROCEDURE — 3017F COLORECTAL CA SCREEN DOC REV: CPT | Performed by: INTERNAL MEDICINE

## 2021-07-29 PROCEDURE — G8417 CALC BMI ABV UP PARAM F/U: HCPCS | Performed by: INTERNAL MEDICINE

## 2021-07-29 PROCEDURE — 1036F TOBACCO NON-USER: CPT | Performed by: INTERNAL MEDICINE

## 2021-07-29 RX ORDER — LISINOPRIL 20 MG/1
20 TABLET ORAL 2 TIMES DAILY
Qty: 180 TABLET | Refills: 1 | Status: SHIPPED
Start: 2021-07-29 | End: 2022-02-04 | Stop reason: SDUPTHER

## 2021-07-29 SDOH — ECONOMIC STABILITY: FOOD INSECURITY: WITHIN THE PAST 12 MONTHS, YOU WORRIED THAT YOUR FOOD WOULD RUN OUT BEFORE YOU GOT MONEY TO BUY MORE.: NEVER TRUE

## 2021-07-29 SDOH — ECONOMIC STABILITY: FOOD INSECURITY: WITHIN THE PAST 12 MONTHS, THE FOOD YOU BOUGHT JUST DIDN'T LAST AND YOU DIDN'T HAVE MONEY TO GET MORE.: NEVER TRUE

## 2021-07-29 ASSESSMENT — PATIENT HEALTH QUESTIONNAIRE - PHQ9
1. LITTLE INTEREST OR PLEASURE IN DOING THINGS: 0
SUM OF ALL RESPONSES TO PHQ QUESTIONS 1-9: 0
SUM OF ALL RESPONSES TO PHQ9 QUESTIONS 1 & 2: 0
SUM OF ALL RESPONSES TO PHQ QUESTIONS 1-9: 0
SUM OF ALL RESPONSES TO PHQ QUESTIONS 1-9: 0
2. FEELING DOWN, DEPRESSED OR HOPELESS: 0

## 2021-07-29 ASSESSMENT — ENCOUNTER SYMPTOMS
SORE THROAT: 0
CONSTIPATION: 0
ABDOMINAL PAIN: 0
RHINORRHEA: 0
NAUSEA: 0
VOMITING: 0
COUGH: 0
DIARRHEA: 0
SHORTNESS OF BREATH: 0
EYE PAIN: 0
CHEST TIGHTNESS: 0
BACK PAIN: 1
BLOOD IN STOOL: 0

## 2021-07-29 ASSESSMENT — SOCIAL DETERMINANTS OF HEALTH (SDOH): HOW HARD IS IT FOR YOU TO PAY FOR THE VERY BASICS LIKE FOOD, HOUSING, MEDICAL CARE, AND HEATING?: NOT HARD AT ALL

## 2021-07-29 NOTE — PROGRESS NOTES
[facility]   Internal Medicine     2021  Evelyn Rosa : 1965 Sex: male  Age:55 y.o. Chief Complaint   Patient presents with    Hypertension    Hyperlipidemia    Diabetes        HPI:   Patient presents to office for evaluation of the following medical concerns. - States was seen In ER () - 5th metatarsal fx. Has seen ortho. Placed in walking boot. States no current pain. Xray not improving. For surgery for pinning (2021). States has been off work     - States still having bilateral knee pain. Stable. States has seen ortho and had injection to left knee. No erythema. No buckling. No locking. Worse with ambulation. No hip pain. no back pain. Taking OTC supplement (aleve). - States has chronic allergies. On flonase. On allovert. Off singulair, Has seen allergy in the past. still has nasal congestion and rhinorrhea. No ear pain. No facial pressure. No fever of chills. No chest pain, SOB. States cough. States taking allavert. Has ProAir inhaler if needed, uses sparingly.    - States erectile issues are stable. States having difficulty maintaining erection. Stable. - States has Diabetes. Follows with endocrinology. Basaglar increased to 31 units and glipizide 5mg 4 tabs am and metformin tid. On jardiance 25mg daily . On trulicity. States some hypoglycemia. States follows with optho and podiatry exam. States sugar running 120-140. All < 200. States walking alot. last visit with endocrinology per reviewed note () a1c 8.1 - inc basaglar to 77F, incr trulicity. Patient states taking 36 units.     - States has high cholesterol. States was placed on Lipitor by endocrinology. States watches diet. - States high blood pressure. States does not check blood pressure at home. On lisinopril. No reported side effects.    - States walks 8-10 miles per day at work    - States in workers comp. States had injury to left hand and caused arthritis.  Following with ortho hand Health Maintenance   - immunizations:   Influenza Vaccination - (11/12/2018), (2019), (2020)   Pneumonia Vaccination - declines   Shingles vaccine (shingrix) - declines  Tetanus Vaccination - (2009)   covid (2/25/2021) #1, (3/18/2021) # 2 - pfizer     - Screenings:   Colonoscopy  - (12/2017) - normal - follow up 10yrs  Prostate     Couseled on Home Safety     ROS:  Review of Systems   Constitutional: Negative for appetite change, chills, fever and unexpected weight change. HENT: Negative for congestion, rhinorrhea and sore throat. Eyes: Negative for pain and visual disturbance. Respiratory: Negative for cough, chest tightness and shortness of breath. Cardiovascular: Negative for chest pain and palpitations. Gastrointestinal: Negative for abdominal pain, blood in stool, constipation, diarrhea, nausea and vomiting. Genitourinary: Negative for difficulty urinating, dysuria, hematuria, scrotal swelling, testicular pain and urgency. Musculoskeletal: Positive for arthralgias and back pain. Negative for gait problem. Skin: Negative for rash. Neurological: Negative for dizziness, syncope, weakness, light-headedness and headaches. Hematological: Negative for adenopathy. Does not bruise/bleed easily. Psychiatric/Behavioral: Negative for suicidal ideas. The patient is not nervous/anxious.           Current Outpatient Medications:     lisinopril (PRINIVIL;ZESTRIL) 20 MG tablet, Take 1 tablet by mouth 2 times daily, Disp: 180 tablet, Rfl: 1    FARXIGA 10 MG tablet, TAKE 1 TABLET BY MOUTH EVERY DAY, Disp: , Rfl:     Dulaglutide (TRULICITY) 3 OL/4.4IG SOPN, [place]icity 3 PM/7.2 mL subcutaneous pen injector  INJECT 3 MG BY SUBCUTANEOUS ROUTE ONCE WEEKLY, Disp: , Rfl:     insulin glargine (BASAGLAR KWIKPEN) 100 UNIT/ML injection pen, Basaglar KwikPen U-100 Insulin 100 unit/mL (3 mL) subcutaneous  Inject 32 units every day by subcutaneous route., Disp: , Rfl:     cetirizine (ZYRTEC) 10 MG tablet, Take 1 tablet by mouth daily, Disp: 30 tablet, Rfl: 1    diclofenac sodium (VOLTAREN) 1 % GEL, Apply 2 g topically 2 times daily, Disp: 50 g, Rfl: 3    empagliflozin (JARDIANCE) 25 MG tablet, Take 25 mg by mouth daily, Disp: , Rfl:     loperamide (IMODIUM) 2 MG capsule, Take 2 mg by mouth 4 times daily as needed for Diarrhea, Disp: , Rfl:     NAPROXEN SODIUM PO, Take by mouth as needed (knee pain), Disp: , Rfl:     albuterol sulfate  (90 Base) MCG/ACT inhaler, Inhale 2 puffs into the lungs 4 times daily as needed for Wheezing Every 4-6 hours as needed, Disp: 1 Inhaler, Rfl: 5    atorvastatin (LIPITOR) 40 MG tablet, Take 40 mg by mouth daily, Disp: , Rfl:     insulin glargine (BASAGLAR KWIKPEN) 100 UNIT/ML injection pen, Inject 31 Units into the skin every morning, Disp: , Rfl:     TRULICITY 1.5 NU/3.5WJ SOPN, 1.5 mg by Subcutaneous Infusion route every 7 days, Disp: , Rfl: 1    metFORMIN (GLUCOPHAGE) 850 MG tablet, Take 850 mg by mouth 3 times daily , Disp: , Rfl:     glipiZIDE (GLUCOTROL) 5 MG tablet, Take 20 mg by mouth Taking 4 tabs q am, Disp: , Rfl:     naproxen (NAPROSYN) 500 MG tablet, Take 1 tablet by mouth 2 times daily for 7 days, Disp: 14 tablet, Rfl: 0    Allergies   Allergen Reactions    No Known Allergies        Past Medical History:   Diagnosis Date    Abdominal pain     Ankle arthralgia     Depression     Diarrhea     Diffuse cervicobrachial syndrome     Hyperlipidemia     Hypertension     Low back pain     Neck pain     Neck sprain     Psychosexual dysfunction associated with inhibited libido     Spasm     Type 2 diabetes mellitus without complication [facility])        Past Surgical History:   Procedure Laterality Date    COLONOSCOPY      12/8/2017 normal     SHOULDER ARTHROSCOPY Right     VASECTOMY  04/2017       No family history on file.     Social History     Socioeconomic History    Marital status:      Spouse name: Dilshad Burroughs Number of children: 2    Years of education: 13    Highest education level: Some college, no degree   Occupational History    Occupation:    Tobacco Use    Smoking status: Former Smoker     Years: 20.00     Quit date: 2020     Years since quittin.9    Smokeless tobacco: Former User     Types: Chew    Tobacco comment: 5-6 cigarettes qd, ocassional cigar   Vaping Use    Vaping Use: Never used   Substance and Sexual Activity    Alcohol use: Yes     Alcohol/week: 4.0 standard drinks     Types: 4 Cans of beer per week    Drug use: No    Sexual activity: Not on file   Other Topics Concern    Not on file   Social History Narrative    Not on file     Social Determinants of Health     Financial Resource Strain: Low Risk     Difficulty of Paying Living Expenses: Not hard at all   Food Insecurity: No Food Insecurity    Worried About [address] [employer] in the Last Year: Never true    [facility] in the Last Year: Never true   Transportation Needs:     Lack of Transportation (Medical):  Lack of Transportation (Non-Medical):    Physical Activity:     Days of Exercise per Week:     Minutes of Exercise per Session:    Stress:     Feeling of Stress :    Social Connections:     Frequency of Communication with Friends and Family:     Frequency of Social Gatherings with Friends and Family:     Attends Adventist Services:     Active Member of Clubs or Organizations:     Attends Club or Organization Meetings:     Marital Status:    Intimate Partner Violence:     Fear of Current or Ex-Partner:     Emotionally Abused:     Physically Abused:     Sexually Abused:        Vitals:    21 0938   BP: (!) 150/100   Pulse: 98   Temp: 97.4 °F (36.3 °C)   TempSrc: Temporal   SpO2: 97%       Exam:  Physical Exam  Constitutional:       Appearance: He is well-developed. HENT:      Head: Normocephalic and atraumatic.       Right Ear: External ear normal.      Left Ear: External ear normal.   Eyes:      Pupils: Pupils are equal, 8/2020     Erectile dysfunction, unspecified erectile dysfunction type  - continue present treatment  - poss related to DM and HTN  - stable    Tachycardia  - declines EKG     Return in about 6 months (around 1/29/2022) for check up and review. No orders of the defined types were placed in this encounter.     Requested Prescriptions     Signed Prescriptions Disp Refills    lisinopril (PRINIVIL;ZESTRIL) 20 MG tablet 180 tablet 1     Sig: Take 1 tablet by mouth 2 times daily       Sara Ahumada, MD  7/29/2021  10:07 AM

## 2021-09-14 ENCOUNTER — HOSPITAL ENCOUNTER (OUTPATIENT)
Age: 56
Discharge: HOME | End: 2021-09-16
Payer: COMMERCIAL

## 2021-09-14 ENCOUNTER — HOSPITAL ENCOUNTER (OUTPATIENT)
Dept: GENERAL RADIOLOGY | Age: 56
Discharge: HOME | End: 2021-09-16
Payer: COMMERCIAL

## 2021-09-14 DIAGNOSIS — S92.354A CLOSED NONDISPLACED FRACTURE OF FIFTH METATARSAL BONE OF RIGHT FOOT, INITIAL ENCOUNTER: ICD-10-CM

## 2021-09-14 PROCEDURE — 73630 X-RAY EXAM OF FOOT: CPT

## 2021-09-15 LAB
AVERAGE GLUCOSE: NORMAL
HBA1C MFR BLD: 8.9 %

## 2021-09-16 LAB
CREATININE, URINE: 33
MICROALBUMIN/CREAT 24H UR: 0.2 MG/G{CREAT}
MICROALBUMIN/CREAT UR-RTO: 6

## 2021-09-22 ENCOUNTER — TELEPHONE (OUTPATIENT)
Dept: PRIMARY CARE CLINIC | Age: 56
End: 2021-09-22

## 2021-09-22 NOTE — LETTER
[facility]  [address]  [place]  Phone: [phone]  Fax: [phone]    Thelma Pisano MD        September 22, 2021     Patient: Nury Murillo   YOB: 1965   Date of Visit: 9/22/2021       To Whom it May Concern:    Nury Murillo is medically stable and optimized for total knee replacement. Diabetes managed through endocrinology. If you have any questions or concerns, please don't hesitate to call.     Sincerely,         Thelma Pisano MD

## 2021-09-22 NOTE — TELEPHONE ENCOUNTER
----- Message from Zeus Salas sent at 9/21/2021  2:53 PM EDT -----  Subject: Message to Provider    QUESTIONS  Information for Provider? Patient needs a form stating that it is ok for   him to get his knees replaced.  ---------------------------------------------------------------------------  --------------  CALL BACK INFO  What is the best way for the office to contact you? OK to leave message on   voicemail  Preferred Call Back Phone Number? [phone]  ---------------------------------------------------------------------------  --------------  SCRIPT ANSWERS  Relationship to Patient?  Self

## 2021-09-22 NOTE — TELEPHONE ENCOUNTER
Okay for letter stating that the patient is medically stable and optimized.   Diabetes managed through endocrinology

## 2021-10-05 ENCOUNTER — HOSPITAL ENCOUNTER (OUTPATIENT)
Age: 56
Discharge: HOME | End: 2021-10-07
Payer: COMMERCIAL

## 2021-10-05 ENCOUNTER — HOSPITAL ENCOUNTER (OUTPATIENT)
Dept: GENERAL RADIOLOGY | Age: 56
Discharge: HOME | End: 2021-10-07
Payer: COMMERCIAL

## 2021-10-05 DIAGNOSIS — S92.354A CLOSED NONDISPLACED FRACTURE OF FIFTH METATARSAL BONE OF RIGHT FOOT, INITIAL ENCOUNTER: ICD-10-CM

## 2021-10-05 PROCEDURE — 73630 X-RAY EXAM OF FOOT: CPT

## 2021-11-04 ENCOUNTER — TELEPHONE (OUTPATIENT)
Dept: FAMILY MEDICINE CLINIC | Age: 56
End: 2021-11-04

## 2021-11-04 NOTE — TELEPHONE ENCOUNTER
Spoke w/ PAT, they can do labs and EKG on Monday. They will send a copy of the results to Dr Karen Hammonds for pt's chart. I am faxing last note, letter of clearance, and endocrinology's last note that shows his A1c in Sept of 8.9.

## 2021-11-04 NOTE — TELEPHONE ENCOUNTER
[facility] sent a fax requesting last lab results and ekg results, last office notes / clearance. We wrote a letter on 9/22/21 saying greggmichelle Sheba Gonsales was cleared but he did not come in for an appt. I can send his July note and letter of clearance from Sept. Does PAT want to do labs and EKG at his appt in Monday or would like Dr Fanta Ngo to order and have results faxed to them by [facility]?     Left a message at [phone]

## 2021-11-04 NOTE — TELEPHONE ENCOUNTER
Daren updated on your below message. He will await call back. He does state howevre his labs from sept were bad , he had a broken foot. He is nervous they will not do sx from the A1c. States his labs would be much better niw if repeated.

## 2021-11-08 ENCOUNTER — HOSPITAL ENCOUNTER (OUTPATIENT)
Age: 56
Discharge: HOME | End: 2021-11-10

## 2021-11-08 LAB
ABO/RH: NORMAL
ANION GAP SERPL CALCULATED.3IONS-SCNC: 14 MMOL/L (ref 7–16)
ANTIBODY SCREEN: NORMAL
APTT: 29 SEC (ref 24.5–35.1)
BASOPHILS ABSOLUTE: 0.05 E9/L (ref 0–0.2)
BASOPHILS RELATIVE PERCENT: 0.7 % (ref 0–2)
BUN BLDV-MCNC: 20 MG/DL (ref 6–20)
CALCIUM SERPL-MCNC: 9.6 MG/DL (ref 8.6–10.2)
CHLORIDE BLD-SCNC: 100 MMOL/L (ref 98–107)
CO2: 22 MMOL/L (ref 22–29)
CREAT SERPL-MCNC: 0.8 MG/DL (ref 0.7–1.2)
EOSINOPHILS ABSOLUTE: 0.08 E9/L (ref 0.05–0.5)
EOSINOPHILS RELATIVE PERCENT: 1.2 % (ref 0–6)
GFR AFRICAN AMERICAN: >60
GFR NON-AFRICAN AMERICAN: >60 ML/MIN/1.73
GLUCOSE BLD-MCNC: 102 MG/DL (ref 74–99)
HBA1C MFR BLD: 7.2 % (ref 4–5.6)
HCT VFR BLD CALC: 45 % (ref 37–54)
HEMOGLOBIN: 15.3 G/DL (ref 12.5–16.5)
IMMATURE GRANULOCYTES #: 0.03 E9/L
IMMATURE GRANULOCYTES %: 0.4 % (ref 0–5)
INR BLD: 1
LYMPHOCYTES ABSOLUTE: 1.45 E9/L (ref 1.5–4)
LYMPHOCYTES RELATIVE PERCENT: 21 % (ref 20–42)
MCH RBC QN AUTO: 30 PG (ref 26–35)
MCHC RBC AUTO-ENTMCNC: 34 % (ref 32–34.5)
MCV RBC AUTO: 88.2 FL (ref 80–99.9)
MONOCYTES ABSOLUTE: 0.59 E9/L (ref 0.1–0.95)
MONOCYTES RELATIVE PERCENT: 8.6 % (ref 2–12)
NEUTROPHILS ABSOLUTE: 4.69 E9/L (ref 1.8–7.3)
NEUTROPHILS RELATIVE PERCENT: 68.1 % (ref 43–80)
PDW BLD-RTO: 13.8 FL (ref 11.5–15)
PLATELET # BLD: 234 E9/L (ref 130–450)
PMV BLD AUTO: 11.6 FL (ref 7–12)
POTASSIUM SERPL-SCNC: 4.3 MMOL/L (ref 3.5–5)
PROTHROMBIN TIME: 10.8 SEC (ref 9.3–12.4)
RBC # BLD: 5.1 E12/L (ref 3.8–5.8)
SODIUM BLD-SCNC: 136 MMOL/L (ref 132–146)
WBC # BLD: 6.9 E9/L (ref 4.5–11.5)

## 2021-11-08 PROCEDURE — 86901 BLOOD TYPING SEROLOGIC RH(D): CPT

## 2021-11-08 PROCEDURE — 83036 HEMOGLOBIN GLYCOSYLATED A1C: CPT

## 2021-11-08 PROCEDURE — 80048 BASIC METABOLIC PNL TOTAL CA: CPT

## 2021-11-08 PROCEDURE — 85610 PROTHROMBIN TIME: CPT

## 2021-11-08 PROCEDURE — 87081 CULTURE SCREEN ONLY: CPT

## 2021-11-08 PROCEDURE — 86850 RBC ANTIBODY SCREEN: CPT

## 2021-11-08 PROCEDURE — 86900 BLOOD TYPING SEROLOGIC ABO: CPT

## 2021-11-08 PROCEDURE — 85025 COMPLETE CBC W/AUTO DIFF WBC: CPT

## 2021-11-08 PROCEDURE — 85730 THROMBOPLASTIN TIME PARTIAL: CPT

## 2021-11-10 LAB — MRSA CULTURE ONLY: NORMAL

## 2021-11-15 ENCOUNTER — HOSPITAL ENCOUNTER (OUTPATIENT)
Age: 56
Discharge: HOME | End: 2021-11-17

## 2021-11-15 PROCEDURE — 88305 TISSUE EXAM BY PATHOLOGIST: CPT

## 2021-11-15 PROCEDURE — 88311 DECALCIFY TISSUE: CPT

## 2021-11-16 ENCOUNTER — HOSPITAL ENCOUNTER (OUTPATIENT)
Age: 56
Discharge: HOME | End: 2021-11-18

## 2021-11-16 LAB
ANION GAP SERPL CALCULATED.3IONS-SCNC: 13 MMOL/L (ref 7–16)
BUN BLDV-MCNC: 16 MG/DL (ref 6–20)
CALCIUM SERPL-MCNC: 8.6 MG/DL (ref 8.6–10.2)
CHLORIDE BLD-SCNC: 101 MMOL/L (ref 98–107)
CO2: 22 MMOL/L (ref 22–29)
CREAT SERPL-MCNC: 0.7 MG/DL (ref 0.7–1.2)
GFR AFRICAN AMERICAN: >60
GFR NON-AFRICAN AMERICAN: >60 ML/MIN/1.73
GLUCOSE BLD-MCNC: 218 MG/DL (ref 74–99)
HCT VFR BLD CALC: 38 % (ref 37–54)
HEMOGLOBIN: 12.8 G/DL (ref 12.5–16.5)
MCH RBC QN AUTO: 30.2 PG (ref 26–35)
MCHC RBC AUTO-ENTMCNC: 33.7 % (ref 32–34.5)
MCV RBC AUTO: 89.6 FL (ref 80–99.9)
PDW BLD-RTO: 14.2 FL (ref 11.5–15)
PLATELET # BLD: 256 E9/L (ref 130–450)
PMV BLD AUTO: 10.7 FL (ref 7–12)
POTASSIUM SERPL-SCNC: 3.9 MMOL/L (ref 3.5–5)
RBC # BLD: 4.24 E12/L (ref 3.8–5.8)
SODIUM BLD-SCNC: 136 MMOL/L (ref 132–146)
WBC # BLD: 11.4 E9/L (ref 4.5–11.5)

## 2021-11-16 PROCEDURE — 85027 COMPLETE CBC AUTOMATED: CPT

## 2021-11-16 PROCEDURE — 80048 BASIC METABOLIC PNL TOTAL CA: CPT

## 2021-11-17 ENCOUNTER — HOSPITAL ENCOUNTER (OUTPATIENT)
Age: 56
Discharge: HOME | End: 2021-11-19

## 2021-11-17 LAB
ANION GAP SERPL CALCULATED.3IONS-SCNC: 12 MMOL/L (ref 7–16)
BUN BLDV-MCNC: 17 MG/DL (ref 6–20)
CALCIUM SERPL-MCNC: 8.7 MG/DL (ref 8.6–10.2)
CHLORIDE BLD-SCNC: 102 MMOL/L (ref 98–107)
CO2: 23 MMOL/L (ref 22–29)
CREAT SERPL-MCNC: 0.9 MG/DL (ref 0.7–1.2)
GFR AFRICAN AMERICAN: >60
GFR NON-AFRICAN AMERICAN: >60 ML/MIN/1.73
GLUCOSE BLD-MCNC: 215 MG/DL (ref 74–99)
HCT VFR BLD CALC: 34.2 % (ref 37–54)
HEMOGLOBIN: 11.6 G/DL (ref 12.5–16.5)
MCH RBC QN AUTO: 30.1 PG (ref 26–35)
MCHC RBC AUTO-ENTMCNC: 33.9 % (ref 32–34.5)
MCV RBC AUTO: 88.6 FL (ref 80–99.9)
PDW BLD-RTO: 14.2 FL (ref 11.5–15)
PLATELET # BLD: 223 E9/L (ref 130–450)
PMV BLD AUTO: 10.8 FL (ref 7–12)
POTASSIUM SERPL-SCNC: 3.9 MMOL/L (ref 3.5–5)
RBC # BLD: 3.86 E12/L (ref 3.8–5.8)
SODIUM BLD-SCNC: 137 MMOL/L (ref 132–146)
WBC # BLD: 10.6 E9/L (ref 4.5–11.5)

## 2021-11-17 PROCEDURE — 85027 COMPLETE CBC AUTOMATED: CPT

## 2021-11-17 PROCEDURE — 80048 BASIC METABOLIC PNL TOTAL CA: CPT

## 2021-11-18 ENCOUNTER — HOSPITAL ENCOUNTER (OUTPATIENT)
Age: 56
Discharge: HOME | End: 2021-11-20

## 2021-11-18 LAB
ANION GAP SERPL CALCULATED.3IONS-SCNC: 11 MMOL/L (ref 7–16)
BUN BLDV-MCNC: 12 MG/DL (ref 6–20)
CALCIUM SERPL-MCNC: 8.8 MG/DL (ref 8.6–10.2)
CHLORIDE BLD-SCNC: 100 MMOL/L (ref 98–107)
CO2: 26 MMOL/L (ref 22–29)
CREAT SERPL-MCNC: 0.7 MG/DL (ref 0.7–1.2)
GFR AFRICAN AMERICAN: >60
GFR NON-AFRICAN AMERICAN: >60 ML/MIN/1.73
GLUCOSE BLD-MCNC: 175 MG/DL (ref 74–99)
HCT VFR BLD CALC: 31.6 % (ref 37–54)
HEMOGLOBIN: 10.4 G/DL (ref 12.5–16.5)
MCH RBC QN AUTO: 29.4 PG (ref 26–35)
MCHC RBC AUTO-ENTMCNC: 32.9 % (ref 32–34.5)
MCV RBC AUTO: 89.3 FL (ref 80–99.9)
PDW BLD-RTO: 14 FL (ref 11.5–15)
PLATELET # BLD: 226 E9/L (ref 130–450)
PMV BLD AUTO: 10.7 FL (ref 7–12)
POTASSIUM SERPL-SCNC: 4.2 MMOL/L (ref 3.5–5)
RBC # BLD: 3.54 E12/L (ref 3.8–5.8)
SODIUM BLD-SCNC: 137 MMOL/L (ref 132–146)
WBC # BLD: 9.4 E9/L (ref 4.5–11.5)

## 2021-11-18 PROCEDURE — 85027 COMPLETE CBC AUTOMATED: CPT

## 2021-11-18 PROCEDURE — 80048 BASIC METABOLIC PNL TOTAL CA: CPT

## 2021-12-21 ENCOUNTER — HOSPITAL ENCOUNTER (OUTPATIENT)
Age: 56
Discharge: HOME | End: 2021-12-23
Payer: COMMERCIAL

## 2021-12-21 ENCOUNTER — TELEPHONE (OUTPATIENT)
Dept: FAMILY MEDICINE CLINIC | Age: 56
End: 2021-12-21

## 2021-12-21 ENCOUNTER — HOSPITAL ENCOUNTER (OUTPATIENT)
Dept: GENERAL RADIOLOGY | Age: 56
Discharge: HOME | End: 2021-12-23
Payer: COMMERCIAL

## 2021-12-21 DIAGNOSIS — Z96.653 AFTERCARE FOLLOWING BILATERAL KNEE JOINT REPLACEMENT SURGERY: ICD-10-CM

## 2021-12-21 DIAGNOSIS — Z47.1 AFTERCARE FOLLOWING BILATERAL KNEE JOINT REPLACEMENT SURGERY: ICD-10-CM

## 2021-12-21 DIAGNOSIS — M17.0 ARTHRITIS OF BOTH KNEES: ICD-10-CM

## 2021-12-21 PROCEDURE — 73560 X-RAY EXAM OF KNEE 1 OR 2: CPT

## 2021-12-21 NOTE — TELEPHONE ENCOUNTER
----- Message from April Nagle sent at 12/21/2021 10:15 AM EST -----  Subject: Appointment Request    Reason for Call: Urgent (Patient Request) No Script    QUESTIONS  Type of Appointment? Established Patient  Reason for appointment request? Available appointments did not meet   patient need  Additional Information for Provider? Patient had bilateral knee surgery on   11/15 and since then has not gotten good sleep. Sleeps about 2 hours at   night due to pain and cramping. Would like to discuss options with his   primary Dr Amanda Limon. Offered his physicians first available on 1/3   and patient cannot make that due to physical therapy, next available isn't   until February. Patient would like to be seen prior to that if possible,   patient okay with virtual appointment to be seen sooner rather that later. Please call patient back  ---------------------------------------------------------------------------  --------------  CALL BACK INFO  What is the best way for the office to contact you? OK to leave message on   [facility]  Preferred Call Back Phone Number? [phone]  ---------------------------------------------------------------------------  --------------  SCRIPT ANSWERS  Relationship to Patient? Self  (Is the patient requesting to see the provider for a procedure?)? No  (Is the patient requesting to see the provider urgently  today or   tomorrow. )? Yes  Have you been diagnosed with, awaiting test results for, or told that you   are suspected of having COVID-19 (Coronavirus)? (If patient has tested   negative or was tested as a requirement for work, school, or travel and   not based on symptoms, answer no)? No  Within the past two weeks have you developed any of the following symptoms   (answer no if symptoms have been present longer than 2 weeks or began   more than 2 weeks ago)?  Fever or Chills, Cough, Shortness of breath or   difficulty breathing, Loss of taste or smell, Sore throat, Nasal congestion, Sneezing or runny nose, Fatigue or generalized body aches   (answer no if pain is specific to a body part e.g. back pain), Diarrhea,   Headache? No  Have you had close contact with someone with COVID-19 in the last 14 days? No  (Service Expert  click yes below to proceed with [employer] As Usual   Scheduling)?  Yes

## 2022-01-06 LAB
AVERAGE GLUCOSE: NORMAL
HBA1C MFR BLD: 6.1 %

## 2022-01-18 ENCOUNTER — HOSPITAL ENCOUNTER (OUTPATIENT)
Dept: GENERAL RADIOLOGY | Age: 57
Discharge: HOME | End: 2022-01-20
Payer: COMMERCIAL

## 2022-01-18 ENCOUNTER — HOSPITAL ENCOUNTER (OUTPATIENT)
Age: 57
Discharge: HOME | End: 2022-01-20
Payer: COMMERCIAL

## 2022-01-18 DIAGNOSIS — Z47.1 AFTERCARE FOLLOWING BILATERAL KNEE JOINT REPLACEMENT SURGERY: ICD-10-CM

## 2022-01-18 DIAGNOSIS — Z96.653 AFTERCARE FOLLOWING BILATERAL KNEE JOINT REPLACEMENT SURGERY: ICD-10-CM

## 2022-01-18 DIAGNOSIS — Z96.651 PRESENCE OF RIGHT ARTIFICIAL KNEE JOINT: ICD-10-CM

## 2022-01-18 PROCEDURE — 73560 X-RAY EXAM OF KNEE 1 OR 2: CPT

## 2022-01-19 ENCOUNTER — TELEPHONE (OUTPATIENT)
Dept: FAMILY MEDICINE CLINIC | Age: 57
End: 2022-01-19

## 2022-01-19 DIAGNOSIS — N39.9 URINATION DISORDER: Primary | ICD-10-CM

## 2022-01-19 NOTE — TELEPHONE ENCOUNTER
Pt states that they placed a cath during his knee surgery and since then his urine \"doesn't come out as well\".  He has not been seen for this this and is requesting a referral to a urologist.

## 2022-01-19 NOTE — TELEPHONE ENCOUNTER
----- Message from Ginger Josselyn sent at 1/19/2022  2:42 PM EST -----  Subject: Referral Request    QUESTIONS   Reason for referral request? pt is requesting a referral for a urologist,   since pt has had his catheter removed he has been having issues with   urinating. Has the physician seen you for this condition before? No   Preferred Specialist (if applicable)? Do you already have an appointment scheduled? No  Additional Information for Provider?   ---------------------------------------------------------------------------  --------------  CALL BACK INFO  What is the best way for the office to contact you? OK to leave message on   voicemail  Preferred Call Back Phone Number?  [phone]

## 2022-01-20 NOTE — TELEPHONE ENCOUNTER
Orders Placed This Encounter   Procedures    MARY - Ramandeep Tabor MD, Urology, [place]     Referral Priority:   Routine     Referral Type:   Eval and Treat     Referral Reason:   Specialty Services Required     Referred to Provider:   Dominic Lopes MD     Requested Specialty:   Urology     Number of Visits Requested:   1     Referral placed

## 2022-02-04 ENCOUNTER — OFFICE VISIT (OUTPATIENT)
Dept: PRIMARY CARE CLINIC | Age: 57
End: 2022-02-04
Payer: COMMERCIAL

## 2022-02-04 VITALS
TEMPERATURE: 97.3 F | OXYGEN SATURATION: 97 % | WEIGHT: 253.2 LBS | BODY MASS INDEX: 36.33 KG/M2 | DIASTOLIC BLOOD PRESSURE: 82 MMHG | SYSTOLIC BLOOD PRESSURE: 136 MMHG | HEART RATE: 149 BPM

## 2022-02-04 DIAGNOSIS — I10 BENIGN ESSENTIAL HYPERTENSION: ICD-10-CM

## 2022-02-04 DIAGNOSIS — Z79.4 TYPE 2 DIABETES MELLITUS WITHOUT COMPLICATION, WITH LONG-TERM CURRENT USE OF INSULIN (HCC): ICD-10-CM

## 2022-02-04 DIAGNOSIS — E11.9 TYPE 2 DIABETES MELLITUS WITHOUT COMPLICATION, WITH LONG-TERM CURRENT USE OF INSULIN (HCC): ICD-10-CM

## 2022-02-04 DIAGNOSIS — J30.9 CHRONIC ALLERGIC RHINITIS: ICD-10-CM

## 2022-02-04 DIAGNOSIS — M15.9 GENERALIZED OSTEOARTHRITIS: ICD-10-CM

## 2022-02-04 DIAGNOSIS — E78.2 MIXED HYPERLIPIDEMIA: ICD-10-CM

## 2022-02-04 DIAGNOSIS — R00.0 TACHYCARDIA: Primary | ICD-10-CM

## 2022-02-04 PROBLEM — S93.401A RIGHT ANKLE SPRAIN: Status: RESOLVED | Noted: 2020-05-08 | Resolved: 2022-02-04

## 2022-02-04 PROBLEM — M25.462 EFFUSION OF LEFT KNEE: Status: RESOLVED | Noted: 2020-09-16 | Resolved: 2022-02-04

## 2022-02-04 PROCEDURE — G8427 DOCREV CUR MEDS BY ELIG CLIN: HCPCS | Performed by: INTERNAL MEDICINE

## 2022-02-04 PROCEDURE — 93000 ELECTROCARDIOGRAM COMPLETE: CPT | Performed by: INTERNAL MEDICINE

## 2022-02-04 PROCEDURE — 1036F TOBACCO NON-USER: CPT | Performed by: INTERNAL MEDICINE

## 2022-02-04 PROCEDURE — G8484 FLU IMMUNIZE NO ADMIN: HCPCS | Performed by: INTERNAL MEDICINE

## 2022-02-04 PROCEDURE — G8417 CALC BMI ABV UP PARAM F/U: HCPCS | Performed by: INTERNAL MEDICINE

## 2022-02-04 PROCEDURE — 2022F DILAT RTA XM EVC RTNOPTHY: CPT | Performed by: INTERNAL MEDICINE

## 2022-02-04 PROCEDURE — 99214 OFFICE O/P EST MOD 30 MIN: CPT | Performed by: INTERNAL MEDICINE

## 2022-02-04 PROCEDURE — 3017F COLORECTAL CA SCREEN DOC REV: CPT | Performed by: INTERNAL MEDICINE

## 2022-02-04 PROCEDURE — 3044F HG A1C LEVEL LT 7.0%: CPT | Performed by: INTERNAL MEDICINE

## 2022-02-04 RX ORDER — METOPROLOL SUCCINATE 25 MG/1
25 TABLET, EXTENDED RELEASE ORAL DAILY
Qty: 30 TABLET | Refills: 5 | Status: SHIPPED
Start: 2022-02-04 | End: 2022-07-15 | Stop reason: SDUPTHER

## 2022-02-04 RX ORDER — LISINOPRIL 20 MG/1
20 TABLET ORAL 2 TIMES DAILY
Qty: 180 TABLET | Refills: 1 | Status: SHIPPED
Start: 2022-02-04 | End: 2022-05-13 | Stop reason: SDUPTHER

## 2022-02-04 ASSESSMENT — ENCOUNTER SYMPTOMS
SORE THROAT: 0
COUGH: 0
BACK PAIN: 1
CONSTIPATION: 0
RHINORRHEA: 0
ABDOMINAL PAIN: 0
CHEST TIGHTNESS: 0
BLOOD IN STOOL: 0
EYE PAIN: 0
DIARRHEA: 0
VOMITING: 0
SHORTNESS OF BREATH: 0
NAUSEA: 0

## 2022-02-04 NOTE — PROGRESS NOTES
[facility]   Internal Medicine     2022  Lindsay Cook : 1965 Sex: male  Age:56 y.o. Chief Complaint   Patient presents with    Hypertension    Diabetes    Hyperlipidemia        HPI:   Patient presents to office for evaluation of the following medical concerns. - States since last visit has had b/l knee replacement (2021). States doing well. Still in physical therapy. [facility] during OR was concerned about elevated heart rate. No chest pain or discomfort. No syncope (did have one episode but sugar was low). No palpitations. No SOB. - States had urine retention post surgery. States was referred to urology. Last visit with urology per reviewed note () - urine retention, start flomax, check PSA () - 0.91    - States was seen In ER () - 5th metatarsal fx. Has seen ortho. Placed in walking boot. States no current pain. Xray not improving. For surgery for pinning (2021). States has been off work     - States has chronic allergies. On flonase. On zyrtec. States taking allavert as needed/ Off singulair, Has seen allergy in the past. still has nasal congestion and rhinorrhea. No ear pain. No facial pressure. No fever of chills. No chest pain, SOB. States cough. Has ProAir inhaler if needed, uses sparingly.    - States erectile issues are stable. States having difficulty maintaining erection. Stable. - States has Diabetes. Follows with endocrinology. Basaglar increased to 31 units and glipizide 5mg 4 tabs am and metformin tid. On jardiance 25mg daily . On trulicity. States some hypoglycemia. States follows with optho and podiatry exam. States sugar running 120-140. All < 200. States walking alot. last visit with endocrinology per reviewed note () -hemoglobin A1c 6.1, A1c may be falsely low due to surgery continue Trulicity Metformin for [employer] glipizide Basaglar to 37-41 unitsf/u .     - States has high cholesterol.  States was placed on Lipitor by endocrinology. States watches diet. - States high blood pressure. [facility] does not check blood pressure at home. On lisinopril. No reported side effects. Eleveated today. - States walks 8-10 miles per day at work    Health Maintenance   - immunizations:   Influenza Vaccination - (11/12/2018), (2019), (2020)   Pneumonia Vaccination - declines   Shingles vaccine (shingrix) - declines  Tetanus Vaccination - (2009)   covid (2/25/2021) #1, (3/18/2021) # 2, (11/5/2021) #3 - pfizer     - Screenings:   Colonoscopy  - (12/2017) - normal - follow up 10yrs  Prostate     Couseled on Home Safety     ROS:  Review of Systems   Constitutional: Negative for appetite change, chills, fever and unexpected weight change. HENT: Negative for congestion, rhinorrhea and sore throat. Eyes: Negative for pain and visual disturbance. Respiratory: Negative for cough, chest tightness and shortness of breath. Cardiovascular: Negative for chest pain and palpitations. Gastrointestinal: Negative for abdominal pain, blood in stool, constipation, diarrhea, nausea and vomiting. Genitourinary: Negative for difficulty urinating, dysuria, hematuria, scrotal swelling, testicular pain and urgency. Musculoskeletal: Positive for arthralgias and back pain. Negative for gait problem. Skin: Negative for rash. Neurological: Negative for dizziness, syncope, weakness, light-headedness and headaches. Hematological: Negative for adenopathy. Does not bruise/bleed easily. Psychiatric/Behavioral: Negative for suicidal ideas. The patient is not nervous/anxious.           Current Outpatient Medications:     lisinopril (PRINIVIL;ZESTRIL) 20 MG tablet, Take 1 tablet by mouth 2 times daily, Disp: 180 tablet, Rfl: 1    metoprolol succinate (TOPROL XL) 25 MG extended release tablet, Take 1 tablet by mouth daily, Disp: 30 tablet, Rfl: 5    naproxen (NAPROSYN) 500 MG tablet, Take 1 tablet by mouth 2 times daily for 7 days, Disp: 14 tablet, Rfl: 0    FARXIGA 10 MG tablet, TAKE 1 TABLET BY MOUTH EVERY DAY, Disp: , Rfl:     Dulaglutide (TRULICITY) 3 PT/3.9TC SOPN, Trulicity 3 AV/0.2 mL subcutaneous pen injector  INJECT 3 MG BY SUBCUTANEOUS ROUTE ONCE WEEKLY, Disp: , Rfl:     insulin glargine (BASAGLAR KWIKPEN) 100 UNIT/ML injection pen, Basaglar KwikPen U-100 Insulin 100 unit/mL (3 mL) subcutaneous  Inject 32 units every day by subcutaneous route., Disp: , Rfl:     cetirizine (ZYRTEC) 10 MG tablet, Take 1 tablet by mouth daily, Disp: 30 tablet, Rfl: 1    diclofenac sodium (VOLTAREN) 1 % GEL, Apply 2 g topically 2 times daily, Disp: 50 g, Rfl: 3    empagliflozin (JARDIANCE) 25 MG tablet, Take 25 mg by mouth daily, Disp: , Rfl:     loperamide (IMODIUM) 2 MG capsule, Take 2 mg by mouth 4 times daily as needed for Diarrhea, Disp: , Rfl:     NAPROXEN SODIUM PO, Take by mouth as needed (knee pain), Disp: , Rfl:     albuterol sulfate  (90 Base) MCG/ACT inhaler, Inhale 2 puffs into the lungs 4 times daily as needed for Wheezing Every 4-6 hours as needed, Disp: 1 Inhaler, Rfl: 5    atorvastatin (LIPITOR) 40 MG tablet, Take 40 mg by mouth daily, Disp: , Rfl:     insulin glargine (BASAGLAR KWIKPEN) 100 UNIT/ML injection pen, Inject 31 Units into the skin every morning, Disp: , Rfl:     TRULICITY 1.5 UF/7.0YH SOPN, 1.5 mg by Subcutaneous Infusion route every 7 days, Disp: , Rfl: 1    metFORMIN (GLUCOPHAGE) 850 MG tablet, Take 850 mg by mouth 3 times daily , Disp: , Rfl:     glipiZIDE (GLUCOTROL) 5 MG tablet, Take 20 mg by mouth Taking 4 tabs q am, Disp: , Rfl:     Allergies   Allergen Reactions    No Known Allergies        Past Medical History:   Diagnosis Date    Abdominal pain     Ankle arthralgia     Depression     Diarrhea     Diffuse cervicobrachial syndrome     Hyperlipidemia     Hypertension     Low back pain     Neck pain     Neck sprain     Psychosexual dysfunction associated with inhibited libido     Spasm     Type 2 diabetes mellitus without complication [facility])        Past Surgical History:   Procedure Laterality Date    COLONOSCOPY      2017 normal     SHOULDER ARTHROSCOPY Right     VASECTOMY  2017       No family history on file. Social History     Socioeconomic History    Marital status:      Spouse name: Radha Wylie Number of children: 2    Years of education: 13    Highest education level: Some college, no degree   Occupational History    Occupation:    Tobacco Use    Smoking status: Former Smoker     Years: 20.00     Quit date: 2020     Years since quittin.4    Smokeless tobacco: Former User     Types: Chew    Tobacco comment: 5-6 cigarettes qd, ocassional cigar   Vaping Use    Vaping Use: Never used   Substance and Sexual Activity    Alcohol use: Yes     Alcohol/week: 4.0 standard drinks     Types: 4 Cans of beer per week    Drug use: No    Sexual activity: Not on file   Other Topics Concern    Not on file   Social History Narrative    Not on file     Social Determinants of Health     Financial Resource Strain: Low Risk     Difficulty of Paying Living Expenses: Not hard at all   Food Insecurity: No Food Insecurity    Worried About [address] in the Last Year: Never true    [facility] in the Last Year: Never true   Transportation Needs:     Lack of Transportation (Medical): Not on file    Lack of Transportation (Non-Medical):  Not on file   Physical Activity:     Days of Exercise per Week: Not on file    Minutes of Exercise per Session: Not on file   Stress:     Feeling of Stress : Not on file   Social Connections:     Frequency of Communication with Friends and Family: Not on file    Frequency of Social Gatherings with Friends and Family: Not on file    Attends [employer] Services: Not on file    Active Member of Clubs or Organizations: Not on file    Attends Club or Organization Meetings: Not on file    Marital Status: Not on file   Intimate Partner Violence:     Fear of Current or Ex-Partner: Not on file    Emotionally Abused: Not on file    Physically Abused: Not on file    Sexually Abused: Not on file   Housing Stability:     Unable to Pay for Housing in the Last Year: Not on file    Number of Places Lived in the Last Year: Not on file    Unstable Housing in the Last Year: Not on file       Vitals:    02/04/22 0730   BP: (!) 144/86   Site: Right Upper Arm   Position: Sitting   Pulse: 149   Temp: 97.3 °F (36.3 °C)   TempSrc: Temporal   SpO2: 97%   Weight: 253 lb 3.2 oz (114.9 kg)       Exam:  Physical Exam  Constitutional:       Appearance: He is well-developed. HENT:      Head: Normocephalic and atraumatic. Right Ear: External ear normal.      Left Ear: External ear normal.   Eyes:      Pupils: Pupils are equal, round, and reactive to light. Neck:      Thyroid: No thyromegaly. Cardiovascular:      Rate and Rhythm: Regular rhythm. Tachycardia present. Heart sounds: Normal heart sounds. No murmur heard. Pulmonary:      Effort: Pulmonary effort is normal.      Breath sounds: Normal breath sounds. No wheezing or rales. Abdominal:      General: Bowel sounds are normal.      Palpations: Abdomen is soft. Tenderness: There is no abdominal tenderness. There is no guarding or rebound. Musculoskeletal:         General: Normal range of motion. Cervical back: Normal range of motion and neck supple. Lymphadenopathy:      Cervical: No cervical adenopathy. Skin:     General: Skin is warm and dry. Neurological:      Mental Status: He is alert and oriented to person, place, and time. Cranial Nerves: No cranial nerve deficit.    Psychiatric:         Judgment: Judgment normal.         Assessment and Plan:    Diagnoses and all orders for this visit:    Tachycardia  - EKG   - cardiac monitor   - referral to Cardio   - add metoprolol     Type 2 diabetes mellitus with complication, with long-term current use of insulin ([address]  - following with endocrinology  - now on Basaglar 34 units  - on glucotrol, metformin, jardiance and trulicity  - last T2D was 6.1 (1/2022)    On Statin   On ACE  follows with optho     Benign essential hypertension  - monitor blood pressure at home  - watch diet, low sodium   - on lisinopril   - Stable     Mixed hyperlipidemia  - watch diet   - on lipitor  - follow labs     Generalized osteoarthritis  - following with ortho  - s/p synvisc to knee   - stable    Chronic pain of both knees  - following with ortho   - s/p injections     Chronic allergic rhinitis  - not improving   - has tried antihistamine   - recommend floase or nasacort  - add Singulair   - declines pulm for poss asthma     Tobacco use  - Counseled to stop smoking   - stopped 8/2020     Erectile dysfunction, unspecified erectile dysfunction type  - continue present treatment  - poss related to DM and HTN  - stable    Return in about 3 months (around 5/4/2022) for check up and review.     Orders Placed This Encounter   Procedures   [facility]     Referral Priority:   Routine     Referral Type:   Eval and Treat     Referral Reason:   Specialty Services Required     Requested Specialty:   Cardiology     Number of Visits Requested:   1    Cardiac event monitor     Standing Status:   Future     Standing Expiration Date:   4/5/2022    EKG 12 Lead     Order Specific Question:   Reason for Exam?     Answer:   Chest pain     Requested Prescriptions     Signed Prescriptions Disp Refills    lisinopril (PRINIVIL;ZESTRIL) 20 MG tablet 180 tablet 1     Sig: Take 1 tablet by mouth 2 times daily    metoprolol succinate (TOPROL XL) 25 MG extended release tablet 30 tablet 5     Sig: Take 1 tablet by mouth daily       Alejo Donahue MD  2/4/2022  7:53 AM

## 2022-02-07 ENCOUNTER — TELEPHONE (OUTPATIENT)
Dept: ADMINISTRATIVE | Age: 57
End: 2022-02-07

## 2022-02-07 NOTE — TELEPHONE ENCOUNTER
Pt calling to schedule NP with Cardiology - referral in the chart. Patient Appointment Form:      PCP: Dr. George Lynch  Referring: Dr. George Lynch    Has the Patient:    Seen a Cardiologist? No    Had a heart catheterization? No    Had heart surgery? No    Had a stress test or nuclear stress test? No    Had an echocardiogram? No    Had a vascular ultrasound? No    Had a 24/48 heart monitor or extended cardiac event monitor? Future event monitor - number given to pt to schedule with EP     Had recent blood work in the last 6 months? Yes 11/18/21 Epic     Had a pacemaker/ICD/ILR implant? No    Seen an Electrophysiologist? No        Will send records via: No prior cardiology hx or recs. [facility]CP recs in [facility]. EP number to pt for event monitor       Date & time of appointment:  2/22/22 @ 7:45 with Dr. Jeronimo Marcus.

## 2022-02-08 ENCOUNTER — TELEPHONE (OUTPATIENT)
Dept: NON INVASIVE DIAGNOSTICS | Age: 57
End: 2022-02-08

## 2022-02-15 ENCOUNTER — NURSE ONLY (OUTPATIENT)
Dept: NON INVASIVE DIAGNOSTICS | Age: 57
End: 2022-02-15

## 2022-02-15 ENCOUNTER — HOSPITAL ENCOUNTER (OUTPATIENT)
Dept: GENERAL RADIOLOGY | Age: 57
Discharge: HOME | End: 2022-02-17
Payer: COMMERCIAL

## 2022-02-15 ENCOUNTER — HOSPITAL ENCOUNTER (OUTPATIENT)
Age: 57
Discharge: HOME | End: 2022-02-17
Payer: COMMERCIAL

## 2022-02-15 DIAGNOSIS — M17.12 UNILATERAL PRIMARY OSTEOARTHRITIS, LEFT KNEE: ICD-10-CM

## 2022-02-15 DIAGNOSIS — M17.0 ARTHRITIS OF BOTH KNEES: ICD-10-CM

## 2022-02-15 PROCEDURE — 73560 X-RAY EXAM OF KNEE 1 OR 2: CPT

## 2022-02-15 NOTE — PROGRESS NOTES
Patient had a new monitor rehooked with [employer] [id number]. Patient will call company with any further issues.

## 2022-02-22 ENCOUNTER — NURSE ONLY (OUTPATIENT)
Dept: CARDIOLOGY CLINIC | Age: 57
End: 2022-02-22

## 2022-02-22 ENCOUNTER — OFFICE VISIT (OUTPATIENT)
Dept: CARDIOLOGY CLINIC | Age: 57
End: 2022-02-22
Payer: COMMERCIAL

## 2022-02-22 VITALS
SYSTOLIC BLOOD PRESSURE: 125 MMHG | HEART RATE: 125 BPM | HEIGHT: 70 IN | BODY MASS INDEX: 36.51 KG/M2 | DIASTOLIC BLOOD PRESSURE: 93 MMHG | WEIGHT: 255 LBS | RESPIRATION RATE: 14 BRPM

## 2022-02-22 DIAGNOSIS — R00.0 TACHYCARDIA: Primary | ICD-10-CM

## 2022-02-22 PROBLEM — M54.9 UPPER BACK PAIN ON LEFT SIDE: Status: RESOLVED | Noted: 2020-02-26 | Resolved: 2022-02-22

## 2022-02-22 PROBLEM — M25.562 CHRONIC PAIN OF BOTH KNEES: Status: RESOLVED | Noted: 2020-12-18 | Resolved: 2022-02-22

## 2022-02-22 PROBLEM — M25.511 ACUTE PAIN OF RIGHT SHOULDER: Status: RESOLVED | Noted: 2019-12-18 | Resolved: 2022-02-22

## 2022-02-22 PROBLEM — G89.29 CHRONIC PAIN OF BOTH KNEES: Status: RESOLVED | Noted: 2020-12-18 | Resolved: 2022-02-22

## 2022-02-22 PROBLEM — M25.561 CHRONIC PAIN OF BOTH KNEES: Status: RESOLVED | Noted: 2020-12-18 | Resolved: 2022-02-22

## 2022-02-22 PROCEDURE — 99244 OFF/OP CNSLTJ NEW/EST MOD 40: CPT | Performed by: INTERNAL MEDICINE

## 2022-02-22 PROCEDURE — 93000 ELECTROCARDIOGRAM COMPLETE: CPT | Performed by: INTERNAL MEDICINE

## 2022-02-22 RX ORDER — CYCLOBENZAPRINE HCL 10 MG
TABLET ORAL PRN
COMMUNITY
Start: 2022-02-15 | End: 2022-09-01

## 2022-02-22 RX ORDER — OXYCODONE HYDROCHLORIDE AND ACETAMINOPHEN 5; 325 MG/1; MG/1
TABLET ORAL PRN
COMMUNITY
Start: 2022-02-15 | End: 2022-09-01

## 2022-02-22 RX ORDER — TAMSULOSIN HYDROCHLORIDE 0.4 MG/1
CAPSULE ORAL
COMMUNITY
Start: 2022-02-21 | End: 2022-09-01

## 2022-02-22 NOTE — PROGRESS NOTES
Chief Complaint   Patient presents with    Tachycardia       Patient Active Problem List    Diagnosis Date Noted    Tachycardia 02/26/2020    Chronic allergic rhinitis 09/20/2019    Generalized osteoarthritis 06/25/2019    Tobacco use 06/25/2019    Erectile dysfunction 06/25/2019    Mixed hyperlipidemia 06/24/2014    Benign essential hypertension 06/24/2014    Type 2 diabetes mellitus ([facility] 06/24/2014       Current Outpatient Medications   Medication Sig Dispense Refill    tamsulosin (FLOMAX) 0.4 MG capsule       oxyCODONE-acetaminophen (PERCOCET) 5-325 MG per tablet take 1 tablet by mouth every 8 hours if needed for pain      cyclobenzaprine (FLEXERIL) 10 MG tablet take 1 tablet by mouth every 8 hours if needed for pain      lisinopril (PRINIVIL;ZESTRIL) 20 MG tablet Take 1 tablet by mouth 2 times daily 180 tablet 1    metoprolol succinate (TOPROL XL) 25 MG extended release tablet Take 1 tablet by mouth daily 30 tablet 5    FARXIGA 10 MG tablet TAKE 1 TABLET BY MOUTH EVERY DAY      Dulaglutide (TRULICITY) 3 CC/4.4OV SOPN Trulicity 3 IE/6.3 mL subcutaneous pen injector   INJECT 3 MG BY SUBCUTANEOUS ROUTE ONCE WEEKLY      cetirizine (ZYRTEC) 10 MG tablet Take 1 tablet by mouth daily 30 tablet 1    atorvastatin (LIPITOR) 40 MG tablet Take 40 mg by mouth daily      insulin glargine (BASAGLAR KWIKPEN) 100 UNIT/ML injection pen Inject 31 Units into the skin every morning      TRULICITY 1.5 OG/0.3LP SOPN 1.5 mg by Subcutaneous Infusion route every 7 days  1    metFORMIN (GLUCOPHAGE) 850 MG tablet Take 850 mg by mouth 3 times daily       glipiZIDE (GLUCOTROL) 5 MG tablet Take 20 mg by mouth Taking 4 tabs q am      naproxen (NAPROSYN) 500 MG tablet Take 1 tablet by mouth 2 times daily for 7 days 14 tablet 0     Current Facility-Administered Medications   Medication Dose Route Frequency Provider Last Rate Last Admin    perflutren lipid microspheres (DEFINITY) injection 1.65 mg  1.5 mL IntraVENous ONCE CHESTER Pichardo MD            Allergies   Allergen Reactions    No Known Allergies        Vitals:    22 0735   BP: (!) 125/93   Pulse: 125   Resp: 14   Weight: 255 lb (115.7 kg)   Height: 5' 10\" (1.778 m)       Social History     Socioeconomic History    Marital status:      Spouse name: Jazmine Urbina Number of children: 2    Years of education: 13    Highest education level: Some college, no degree   Occupational History    Occupation:    Tobacco Use    Smoking status: Former Smoker     Years: 20.00     Quit date: 2020     Years since quittin.5    Smokeless tobacco: Former User     Types: Chew    Tobacco comment: 5-6 cigarettes qd, ocassional cigar   Vaping Use    Vaping Use: Never used   Substance and Sexual Activity    Alcohol use: Yes     Alcohol/week: 4.0 standard drinks     Types: 4 Cans of beer per week    Drug use: No    Sexual activity: Not on file   Other Topics Concern    Not on file   Social History Narrative    Not on file     Social Determinants of Health     Financial Resource Strain: Low Risk     Difficulty of Paying Living Expenses: Not hard at all   Food Insecurity: No Food Insecurity    Worried About [facility] in the Last Year: Never true    [facility] in the Last Year: Never true   Transportation Needs:     Lack of Transportation (Medical): Not on file    Lack of Transportation (Non-Medical):  Not on file   Physical Activity:     Days of Exercise per Week: Not on file    Minutes of Exercise per Session: Not on file   Stress:     Feeling of Stress : Not on file   Social Connections:     Frequency of Communication with Friends and Family: Not on file    Frequency of Social Gatherings with Friends and Family: Not on file    Attends Church Services: Not on file    Active Member of Clubs or Organizations: Not on file    Attends Club or Organization Meetings: Not on file    Marital Status: Not on file   Intimate Partner Violence:     Fear of Current or Ex-Partner: Not on file    Emotionally Abused: Not on file    Physically Abused: Not on file    Sexually Abused: Not on file   Housing Stability:     Unable to Pay for Housing in the Last Year: Not on file    Number of Places Lived in the Last Year: Not on file    Unstable Housing in the Last Year: Not on file       History reviewed. No pertinent family history. SUBJECTIVE: Mauricio Pimentel presents to the office today for consult - dr Jose A Sanchez - for tachycardia. Claims he has been told even as far back as high school football that he had a fast heart beat. He complains of no cardiac symptoms - works as  and up until knee replacement recently walked > 5 miles / day at job and denies   dyspnea, exertional chest pressure/discomfort, fatigue, irregular heart beat, lower extremity edema, near-syncope, orthopnea, palpitations, paroxysmal nocturnal dyspnea, syncope and tachypnea. TSH 2020 2. 26. Drinks a lot of caffeine during the day. No KLYAH diagnosis. Has DM        Review of Systems:   Heart: as above   Lungs: as above   Eyes: denies changes in vision or discharge. Ears: denies changes in hearing or pain. Nose: denies epistaxis or masses   Throat: denies sore throat or trouble swallowing. Neuro: denies numbness, tingling, tremors. Skin: denies rashes or itching. : denies hematuria, dysuria   GI: denies vomiting, diarrhea   Psych: denies mood changed, anxiety, depression. all others negative. Physical Exam   BP (!) 125/93   Pulse 125   Resp 14   Ht 5' 10\" (1.778 m)   Wt 255 lb (115.7 kg)   BMI 36.59 kg/m²   Constitutional: Oriented to person, place, and time. obese. No distress. Head: Normocephalic and atraumatic. Eyes: EOM are normal. Pupils are equal, round, and reactive to light. Neck: Normal range of motion. Neck supple. No hepatojugular reflux and no JVD present. Carotid bruit is not present.    Cardiovascular: Normal rate, regular rhythm, normal heart sounds and intact distal pulses. Exam reveals no gallop and no friction rub. No murmur heard. Pulmonary/Chest: Effort normal and breath sounds normal. No respiratory distress. No wheezes. No rales. Abdominal: Soft. Bowel sounds are normal. No distension and no mass. No tenderness. No rebound and no guarding. Musculoskeletal: Normal range of motion. No edema and no tenderness. Neurological: Alert and oriented to person, place, and time. Skin: Skin is warm and dry. No rash noted. Not diaphoretic. No erythema. Psychiatric: Normal mood and affect.  Behavior is normal.     EKG:  nonspecific ST and T waves changes, sinus tachycardia, occasional PVC noted, unifocal.    ASSESSMENT AND PLAN:  Patient Active Problem List   Diagnosis    Mixed hyperlipidemia    hypertension    Type 2 diabetes mellitus                     Tachycardia     Asymptomatic patient at good functional capacity with long standing tachycardia, likely sinus, due to autonomic issues from DM, relative volume depletion secondary to caffeine usage  Normal CV exam  ekg with sinus tachycardia and PVCs  Will get 24 hour holter to evaluate HR response throughout the day and rule out unlikely diagnosis of SNRT  Echo  Consider ETT-R given DM and PVCs  Reduce caffeine intake, hydrate with water            Aníbal Yun M.D  [facility]

## 2022-02-22 NOTE — PROGRESS NOTES
Patient was seen in office today for the placement of a 24 hour holter per . Patient tolerated well & understood instructions. Device # [id number]  Kimberly Bowden MA.

## 2022-02-28 DIAGNOSIS — R00.0 TACHYCARDIA: ICD-10-CM

## 2022-03-01 ENCOUNTER — TELEPHONE (OUTPATIENT)
Dept: CARDIOLOGY CLINIC | Age: 57
End: 2022-03-01

## 2022-03-01 NOTE — TELEPHONE ENCOUNTER
----- Message from Tamiko Truong MD sent at 3/1/2022  8:43 AM EST -----  Monitor shows his average HR is fine  Await echo

## 2022-03-04 DIAGNOSIS — R00.0 TACHYCARDIA: ICD-10-CM

## 2022-03-06 ENCOUNTER — TELEPHONE (OUTPATIENT)
Dept: FAMILY MEDICINE CLINIC | Age: 57
End: 2022-03-06

## 2022-03-06 NOTE — TELEPHONE ENCOUNTER
Please let the patient know that the heart monitor per cardiology report suggested    Overall predominant rhythm was normal sinus rhythm    There was evidence for sinus tachycardia    There was evidence for rare ectopies both supraventricular and ventricular but overall rare    Thanks

## 2022-03-08 NOTE — TELEPHONE ENCOUNTER
Reviewed results w/ Sonia Ordaz, he also reviewed the results w/ the cardiologist. 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

## 2022-03-14 ENCOUNTER — OFFICE VISIT (OUTPATIENT)
Dept: FAMILY MEDICINE CLINIC | Age: 57
End: 2022-03-14
Payer: COMMERCIAL

## 2022-03-14 VITALS
SYSTOLIC BLOOD PRESSURE: 126 MMHG | OXYGEN SATURATION: 98 % | DIASTOLIC BLOOD PRESSURE: 88 MMHG | WEIGHT: 257 LBS | TEMPERATURE: 97.6 F | HEIGHT: 70 IN | BODY MASS INDEX: 36.79 KG/M2 | HEART RATE: 110 BPM

## 2022-03-14 DIAGNOSIS — J01.90 ACUTE SINUSITIS, RECURRENCE NOT SPECIFIED, UNSPECIFIED LOCATION: Primary | ICD-10-CM

## 2022-03-14 PROCEDURE — 99213 OFFICE O/P EST LOW 20 MIN: CPT | Performed by: INTERNAL MEDICINE

## 2022-03-14 RX ORDER — CEFDINIR 300 MG/1
300 CAPSULE ORAL 2 TIMES DAILY
Qty: 14 CAPSULE | Refills: 0 | Status: SHIPPED | OUTPATIENT
Start: 2022-03-14 | End: 2022-03-21

## 2022-03-14 RX ORDER — OFLOXACIN 3 MG/ML
5 SOLUTION AURICULAR (OTIC) 2 TIMES DAILY
Qty: 5 ML | Refills: 0 | Status: SHIPPED | OUTPATIENT
Start: 2022-03-14 | End: 2022-03-24

## 2022-03-14 RX ORDER — DULAGLUTIDE 4.5 MG/.5ML
INJECTION, SOLUTION SUBCUTANEOUS
COMMUNITY
Start: 2022-02-08

## 2022-03-14 RX ORDER — METHYLPREDNISOLONE 4 MG/1
TABLET ORAL
Qty: 1 KIT | Refills: 0 | Status: SHIPPED | OUTPATIENT
Start: 2022-03-14 | End: 2022-03-20

## 2022-03-14 NOTE — PROGRESS NOTES
3/14/22  Carol Ann Aj : 1965 Sex: male  Age 55 y.o. Chief Complaint   Patient presents with    Headache     Back of neck that extends to his eye    Ear Fullness     Left > Right gurgling    Head Congestion     Stuffy nose x 1 week     Cough       HPI: The patient states that they have had sinus pressure and congestion for the last 1 week. The patient does admit to facial pressure. Admits to minimal cough which is occasionally productive of sputum. The patient also reports nasal congestion. No sore throat. States some nasal discharge. The patient has had general malaise. States headache. States has had a white nose in b/l ears worse since his surgery. States gurgling in left ear. Denies subjective fever and chills. Some neck discomfort. Patient denies decreased appetite. No dizziness or visual disturbances. No loss of taste or smell. No chest pain or dyspnea. No nausea, vomiting, abdo pain  or diarrhea. The patient presents for evaluation. ROS:  Const: Positives and pertinent negatives as per HPI. All others reviewed and are negative.         Current Outpatient Medications:     TRULICITY 4.5 ZK/6.1ML SOPN, inject 0.5 milliliters subcutaneously every week, Disp: , Rfl:     methylPREDNISolone (MEDROL DOSEPACK) 4 MG tablet, Take by mouth., Disp: 1 kit, Rfl: 0    ofloxacin (FLOXIN) 0.3 % otic solution, Place 5 drops into both ears 2 times daily for 10 days, Disp: 5 mL, Rfl: 0    cefdinir (OMNICEF) 300 MG capsule, Take 1 capsule by mouth 2 times daily for 7 days, Disp: 14 capsule, Rfl: 0    tamsulosin (FLOMAX) 0.4 MG capsule, , Disp: , Rfl:     oxyCODONE-acetaminophen (PERCOCET) 5-325 MG per tablet, as needed for Pain. , Disp: , Rfl:     cyclobenzaprine (FLEXERIL) 10 MG tablet, as needed for Muscle spasms , Disp: , Rfl:     lisinopril (PRINIVIL;ZESTRIL) 20 MG tablet, Take 1 tablet by mouth 2 times daily, Disp: 180 tablet, Rfl: 1    metoprolol succinate (TOPROL XL) 25 MG extended release tablet, Take 1 tablet by mouth daily, Disp: 30 tablet, Rfl: 5    FARXIGA 10 MG tablet, TAKE 1 TABLET BY MOUTH EVERY DAY, Disp: , Rfl:     cetirizine (ZYRTEC) 10 MG tablet, Take 1 tablet by mouth daily, Disp: 30 tablet, Rfl: 1    atorvastatin (LIPITOR) 40 MG tablet, Take 40 mg by mouth daily, Disp: , Rfl:     insulin glargine (BASAGLAR KWIKPEN) 100 UNIT/ML injection pen, Inject 37 Units into the skin every morning , Disp: , Rfl:     metFORMIN (GLUCOPHAGE) 850 MG tablet, Take 850 mg by mouth 3 times daily , Disp: , Rfl:     glipiZIDE (GLUCOTROL) 5 MG tablet, Take 20 mg by mouth Taking 4 tabs q am, Disp: , Rfl:   Allergies   Allergen Reactions    No Known Allergies        Past Medical History:   Diagnosis Date    Abdominal pain     Ankle arthralgia     Depression     Diarrhea     Diffuse cervicobrachial syndrome     Hyperlipidemia     Hypertension     Low back pain     Neck pain     Neck sprain     Psychosexual dysfunction associated with inhibited libido     Spasm     Type 2 diabetes mellitus without complication [facility])      Past Surgical History:   Procedure Laterality Date    COLONOSCOPY      2017 normal     SHOULDER ARTHROSCOPY Right     VASECTOMY  2017     No family history on file. Social History     Socioeconomic History    Marital status:      Spouse name: Libia Bray Number of children: 2    Years of education: 13    Highest education level: Some college, no degree   Occupational History    Occupation:    Tobacco Use    Smoking status: Former Smoker     Years: 20.00     Quit date: 2020     Years since quittin.5    Smokeless tobacco: Former User     Types: Chew    Tobacco comment: 5-6 cigarettes qd, ocassional cigar   Vaping Use    Vaping Use: Never used   Substance and Sexual Activity    Alcohol use:  Yes     Alcohol/week: 4.0 standard drinks     Types: 4 Cans of beer per week    Drug use: No    Sexual activity: Not on file   Other Topics Concern    Not on file   Social History Narrative    Not on file     Social Determinants of Health     Financial Resource Strain: Low Risk     Difficulty of Paying Living Expenses: Not hard at all   Food Insecurity: No Food Insecurity    Worried About Running Out of Food in the Last Year: Never true    [address] in the Last Year: Never true   Transportation Needs:     Lack of Transportation (Medical): Not on file    Lack of Transportation (Non-Medical): Not on file   Physical Activity:     Days of Exercise per Week: Not on file    Minutes of Exercise per Session: Not on file   Stress:     Feeling of Stress : Not on file   Social Connections:     Frequency of Communication with Friends and Family: Not on file    Frequency of Social Gatherings with Friends and Family: Not on file    Attends Baptism Services: Not on file    Active Member of [address] [place] [address] or Organizations: Not on file    Attends Club or Organization Meetings: Not on file    Marital Status: Not on file   Intimate Partner Violence:     Fear of Current or Ex-Partner: Not on file    Emotionally Abused: Not on file    Physically Abused: Not on file    Sexually Abused: Not on file   Housing Stability:     Unable to Pay for Housing in the Last Year: Not on file    Number of [place] in the Last Year: Not on file    Unstable Housing in the Last Year: Not on file       Vitals:    03/14/22 0923   BP: 126/88   Pulse: 110   Temp: 97.6 °F (36.4 °C)   SpO2: 98%   Weight: 257 lb (116.6 kg)   Height: 5' 10\" (1.778 m)       Exam:  Physical Exam  Vitals reviewed. Constitutional:       Appearance: He is well-developed. HENT:      Head: Normocephalic and atraumatic. Right Ear: External ear normal.      Left Ear: External ear normal.      Ears:      Comments: Some erythema to right canal      Nose: Congestion present. Eyes:      Pupils: Pupils are equal, round, and reactive to light. Neck:      Thyroid: No thyromegaly. Cardiovascular:      Rate and Rhythm: Normal rate and regular rhythm. Heart sounds: Normal heart sounds. No murmur heard. Pulmonary:      Effort: Pulmonary effort is normal.      Breath sounds: Normal breath sounds. No wheezing or rales. Abdominal:      General: Bowel sounds are normal.      Palpations: Abdomen is soft. Tenderness: There is no abdominal tenderness. There is no guarding or rebound. Musculoskeletal:         General: Normal range of motion. Cervical back: Normal range of motion and neck supple. Lymphadenopathy:      Cervical: No cervical adenopathy. Skin:     General: Skin is warm and dry. Neurological:      Mental Status: He is alert and oriented to person, place, and time. Cranial Nerves: No cranial nerve deficit. Psychiatric:         Judgment: Judgment normal.         Assessment and Plan:  Yulia Gregorio was seen today for headache, ear fullness, head congestion and cough. Diagnoses and all orders for this visit:    Acute sinusitis, recurrence not specified, unspecified location  Comments:  antihistamine   steroid nasal spray   medrol pack - disucssed sugars   cefdinir x 7 days  ofloxacin drops   Orders:  -     methylPREDNISolone (MEDROL DOSEPACK) 4 MG tablet; Take by mouth.  -     ofloxacin (FLOXIN) 0.3 % otic solution; Place 5 drops into both ears 2 times daily for 10 days  -     cefdinir (OMNICEF) 300 MG capsule; Take 1 capsule by mouth 2 times daily for 7 days    Follow-up with your primary care provider in 7-10 days for re-evaluation and further management. Return  sooner or go to the Emergency Department immediately if your condition changes or worsens. Return for check up and review, as scheduled.       Rod Lutz MD

## 2022-04-25 ENCOUNTER — OFFICE VISIT (OUTPATIENT)
Dept: FAMILY MEDICINE CLINIC | Age: 57
End: 2022-04-25
Payer: COMMERCIAL

## 2022-04-25 VITALS
RESPIRATION RATE: 18 BRPM | OXYGEN SATURATION: 96 % | DIASTOLIC BLOOD PRESSURE: 86 MMHG | HEIGHT: 70 IN | BODY MASS INDEX: 36.65 KG/M2 | SYSTOLIC BLOOD PRESSURE: 124 MMHG | HEART RATE: 115 BPM | TEMPERATURE: 97.9 F | WEIGHT: 256 LBS

## 2022-04-25 DIAGNOSIS — J40 BRONCHITIS: Primary | ICD-10-CM

## 2022-04-25 PROCEDURE — 99213 OFFICE O/P EST LOW 20 MIN: CPT | Performed by: STUDENT IN AN ORGANIZED HEALTH CARE EDUCATION/TRAINING PROGRAM

## 2022-04-25 RX ORDER — PREDNISONE 20 MG/1
20 TABLET ORAL DAILY
Qty: 5 TABLET | Refills: 0 | Status: SHIPPED | OUTPATIENT
Start: 2022-04-25 | End: 2022-04-30

## 2022-04-25 RX ORDER — AZITHROMYCIN 250 MG/1
250 TABLET, FILM COATED ORAL SEE ADMIN INSTRUCTIONS
Qty: 6 TABLET | Refills: 0 | Status: SHIPPED | OUTPATIENT
Start: 2022-04-25 | End: 2022-04-30

## 2022-04-25 ASSESSMENT — ENCOUNTER SYMPTOMS
NAUSEA: 0
SORE THROAT: 1
SHORTNESS OF BREATH: 0
COUGH: 1
WHEEZING: 1
ABDOMINAL PAIN: 0
SINUS PRESSURE: 1
RHINORRHEA: 0
VOMITING: 0

## 2022-04-25 NOTE — PROGRESS NOTES
Remigio Antoine (:  1965) is a 55 y.o. male,Established patient, here for evaluation of the following chief complaint(s):  Chest Congestion (x 7days ) and Drainage ((clear))         ASSESSMENT/PLAN:  1. Bronchitis  -     predniSONE (DELTASONE) 20 MG tablet; Take 1 tablet by mouth daily for 5 days, Disp-5 tablet, R-0Normal  -     azithromycin (ZITHROMAX) 250 MG tablet; Take 1 tablet by mouth See Admin Instructions for 5 days 500mg on day 1 followed by 250mg on days 2 - 5, Disp-6 tablet, R-0Normal      Return if symptoms worsen or fail to improve. Bronchitis +/- sinusitis going on for over a week. Will use empiric abx and steroids (lower dose bc of DM history) and advised him to let endocrinology know if his glucose levels were running consistently above 180. Discussed return and ER precautions. Patient and or parent verbalized understanding      Subjective   SUBJECTIVE/OBJECTIVE:  Head and chest congestion  -has been coughing, sneezing all night  -has been using OTC meds which are not helping-robatussin DM and mucinex  -started over a week ago  -commonly gets allergies this time of year but they resolve  -no fever or shortness of breath  -occasionally cough is productive  -there is pressure in the ears  -he has noticed wheezing  -reports hx of DM. Follows with endrocrinology    Lab Results   Component Value Date    LABA1C 6.1 2022     No results found for: EAG      Review of Systems   Constitutional: Negative for chills and fever. HENT: Positive for congestion, ear discharge, sinus pressure and sore throat. Negative for rhinorrhea. Respiratory: Positive for cough and wheezing. Negative for shortness of breath. Cardiovascular: Negative for chest pain and leg swelling. Gastrointestinal: Negative for abdominal pain, nausea and vomiting. Genitourinary: Negative for dysuria and hematuria. Musculoskeletal: Negative for arthralgias and myalgias. Skin: Negative for rash and wound. Neurological: Negative for dizziness and light-headedness. Objective   Physical Exam  Vitals reviewed. Constitutional:       General: He is not in acute distress. HENT:      Head: Normocephalic and atraumatic. Right Ear: Tympanic membrane normal.      Left Ear: Tympanic membrane normal.      Mouth/Throat:      Pharynx: Posterior oropharyngeal erythema present. No oropharyngeal exudate. Eyes:      Extraocular Movements: Extraocular movements intact. Conjunctiva/sclera: Conjunctivae normal.   Cardiovascular:      Rate and Rhythm: Normal rate and regular rhythm. Pulmonary:      Effort: Pulmonary effort is normal.      Breath sounds: Normal breath sounds. No wheezing. Abdominal:      General: Abdomen is flat. There is no distension. Musculoskeletal:         General: No tenderness or deformity. Lymphadenopathy:      Cervical: Cervical adenopathy present. Neurological:      General: No focal deficit present. Mental Status: He is alert and oriented to person, place, and time. An electronic signature was used to authenticate this note.     --Thomas Ring MD

## 2022-05-03 ENCOUNTER — TELEPHONE (OUTPATIENT)
Dept: FAMILY MEDICINE CLINIC | Age: 57
End: 2022-05-03

## 2022-05-03 RX ORDER — DOXYCYCLINE HYCLATE 100 MG
100 TABLET ORAL 2 TIMES DAILY
Qty: 14 TABLET | Refills: 0 | Status: SHIPPED | OUTPATIENT
Start: 2022-05-03 | End: 2022-05-10

## 2022-05-03 NOTE — TELEPHONE ENCOUNTER
Requested Prescriptions     Signed Prescriptions Disp Refills    doxycycline hyclate (VIBRA-TABS) 100 MG tablet 14 tablet 0     Sig: Take 1 tablet by mouth 2 times daily for 7 days     Authorizing Provider: Nadine Cohen to pharmacy

## 2022-05-03 NOTE — TELEPHONE ENCOUNTER
----- Message from Hilario Phuong sent at 5/2/2022  4:20 PM EDT -----  Subject: Medication Problem    QUESTIONS  Name of Medication? predniSONE (DELTASONE) 20 MG tablet  Patient-reported dosage and instructions? Take 1 tablet by mouth 2 times   daily for 5 days  What question or problem do you have with the medication? Pt was in [facility] on 04/25 for his Bronchitis and Sinus   infection in his head and chest. He was given Prednisone and an Antibiotic   to treat his symptoms, but they have not worked. Preferred Pharmacy? [facility] [address] [place], [place] - [address] [phone]  Pharmacy phone number (if available)? [phone]  Additional Information for Provider? IF there is a stronger alternative to   these prescriptions, please call back at the number below to prescribe him   a better treatment before he sees Dr. Sagar Raygoza on 05/13 @ 10:00 am  ---------------------------------------------------------------------------  --------------  [address]  What is the best way for the office to contact you? OK to leave message on   voicemail, OK to respond with electronic message via [facility] portal (only   for patients who have registered [facility] account)  Preferred Call Back Phone Number? [phone]  ---------------------------------------------------------------------------  --------------  SCRIPT ANSWERS  Relationship to Patient?  Self

## 2022-05-12 LAB
AVERAGE GLUCOSE: NORMAL
HBA1C MFR BLD: 8.7 %

## 2022-05-13 ENCOUNTER — OFFICE VISIT (OUTPATIENT)
Dept: PRIMARY CARE CLINIC | Age: 57
End: 2022-05-13
Payer: COMMERCIAL

## 2022-05-13 VITALS
OXYGEN SATURATION: 98 % | BODY MASS INDEX: 36.59 KG/M2 | HEART RATE: 106 BPM | TEMPERATURE: 97.6 F | SYSTOLIC BLOOD PRESSURE: 132 MMHG | WEIGHT: 255 LBS | DIASTOLIC BLOOD PRESSURE: 80 MMHG

## 2022-05-13 DIAGNOSIS — R53.83 OTHER FATIGUE: ICD-10-CM

## 2022-05-13 DIAGNOSIS — E55.9 VITAMIN D DEFICIENCY: ICD-10-CM

## 2022-05-13 DIAGNOSIS — J30.9 CHRONIC ALLERGIC RHINITIS: ICD-10-CM

## 2022-05-13 DIAGNOSIS — I10 BENIGN ESSENTIAL HYPERTENSION: ICD-10-CM

## 2022-05-13 DIAGNOSIS — E78.2 MIXED HYPERLIPIDEMIA: ICD-10-CM

## 2022-05-13 DIAGNOSIS — M25.561 CHRONIC PAIN OF BOTH KNEES: ICD-10-CM

## 2022-05-13 DIAGNOSIS — M15.9 GENERALIZED OSTEOARTHRITIS: ICD-10-CM

## 2022-05-13 DIAGNOSIS — E11.9 TYPE 2 DIABETES MELLITUS WITHOUT COMPLICATION, WITH LONG-TERM CURRENT USE OF INSULIN (HCC): Primary | ICD-10-CM

## 2022-05-13 DIAGNOSIS — Z72.0 TOBACCO USE: ICD-10-CM

## 2022-05-13 DIAGNOSIS — M25.562 CHRONIC PAIN OF BOTH KNEES: ICD-10-CM

## 2022-05-13 DIAGNOSIS — G89.29 CHRONIC PAIN OF BOTH KNEES: ICD-10-CM

## 2022-05-13 DIAGNOSIS — Z79.4 TYPE 2 DIABETES MELLITUS WITHOUT COMPLICATION, WITH LONG-TERM CURRENT USE OF INSULIN (HCC): Primary | ICD-10-CM

## 2022-05-13 PROCEDURE — 3044F HG A1C LEVEL LT 7.0%: CPT | Performed by: INTERNAL MEDICINE

## 2022-05-13 PROCEDURE — 99213 OFFICE O/P EST LOW 20 MIN: CPT | Performed by: INTERNAL MEDICINE

## 2022-05-13 RX ORDER — LISINOPRIL 20 MG/1
20 TABLET ORAL 2 TIMES DAILY
Qty: 180 TABLET | Refills: 1 | Status: SHIPPED
Start: 2022-05-13 | End: 2022-09-01 | Stop reason: SDUPTHER

## 2022-05-13 ASSESSMENT — ENCOUNTER SYMPTOMS
BACK PAIN: 1
COUGH: 0
SHORTNESS OF BREATH: 0
DIARRHEA: 0
SORE THROAT: 0
CONSTIPATION: 0
RHINORRHEA: 0
EYE PAIN: 0
ABDOMINAL PAIN: 0
BLOOD IN STOOL: 0
CHEST TIGHTNESS: 0
NAUSEA: 0
VOMITING: 0

## 2022-05-13 NOTE — PROGRESS NOTES
[facility]) Physicians   Internal Medicine     2022  Ethel Martines : 1965 Sex: male  Age:56 y.o. Chief Complaint   Patient presents with    Hypertension    Diabetes    Hyperlipidemia        HPI:   Patient presents to office for evaluation of the following medical concerns. - The patient states that they have had sinus pressure and congestion for the last 1 month. No  facial pressure. Admits to minimal cough which is occasionally productive of sputum. The patient also reports nasal congestion. No sore throat. States some nasal discharge. The patient has had general malaise. States headache. Denies subjective fever and chills. Some neck discomfort. Patient denies decreased appetite. No dizziness or visual disturbances. No loss of taste or smell. No chest pain or dyspnea. No nausea, vomiting, abdo pain  or diarrhea. States was seen in urgent care and given prednisone taper and zpack. Did not improve and was given doxycycline. States has tried mucinex. States has tried robitussin. States on claritin. - States since last visit has had b/l knee replacement (2021). States doing well. competed physical therapy. [facility] during OR was concerned about elevated heart rate. No chest pain or discomfort. No syncope (did have one episode but sugar was low). No palpitations. No SOB. monitor (3/22) -this rhythm, super ventricular ectopy, ventricular premature beats rare    - States had urine retention post surgery. States was referred to urology. Last visit with urology per reviewed note () - urine retention, start flomax, check PSA () - 0.91    - States has chronic allergies. States taking allavert as needed. Off singulair, Has seen allergy in the past. still has nasal congestion and rhinorrhea. No ear pain. No facial pressure. No fever of chills. No chest pain, SOB. States cough. Has ProAir inhaler if needed, uses sparingly.    - States erectile issues are stable.  States having difficulty maintaining erection. Stable. - States has Diabetes. Follows with endocrinology. Basaglar increased to 31 units and glipizide 5mg 4 tabs am and metformin tid. On jardiance 25mg daily . On trulicity. States some hypoglycemia. States follows with optho and podiatry exam. States sugar running 120-140. All < 200. States walking alot. last visit with endocrinology per reviewed note (1/22) -hemoglobin A1c 6.1, A1c may be falsely low due to surgery continue Trulicity Metformin for [employer] glipizide Basaglar to 37-41 unitsf/u 5/22. Last A1c was 8.7 (5/2022)     - States has high cholesterol. States was placed on Lipitor by endocrinology. States watches diet. - States high blood pressure. States does not check blood pressure at home. On lisinopril. No reported side effects. Eleveated today. - States walks 8-10 miles per day at work    Health Maintenance   - immunizations:   Influenza Vaccination - (11/12/2018), (2019), (2020)   Pneumonia Vaccination - declines   Shingles vaccine (shingrix) - declines  Tetanus Vaccination - (2009)   covid (2/25/2021) #1, (3/18/2021) # 2, (11/5/2021) #3 - pfizer     - Screenings:   Colonoscopy  - (12/2017) - normal - follow up 10yrs  Prostate     Couseled on Home Safety     ROS:  Review of Systems   Constitutional: Negative for appetite change, chills, fever and unexpected weight change. HENT: Negative for congestion, rhinorrhea and sore throat. Eyes: Negative for pain and visual disturbance. Respiratory: Negative for cough, chest tightness and shortness of breath. Cardiovascular: Negative for chest pain and palpitations. Gastrointestinal: Negative for abdominal pain, blood in stool, constipation, diarrhea, nausea and vomiting. Genitourinary: Negative for difficulty urinating, dysuria, hematuria, scrotal swelling, testicular pain and urgency. Musculoskeletal: Positive for arthralgias and back pain. Negative for gait problem. Skin: Negative for rash. Neurological: Negative for dizziness, syncope, weakness, light-headedness and headaches. Hematological: Negative for adenopathy. Does not bruise/bleed easily. Psychiatric/Behavioral: Negative for suicidal ideas. The patient is not nervous/anxious.           Current Outpatient Medications:     lisinopril (PRINIVIL;ZESTRIL) 20 MG tablet, Take 1 tablet by mouth 2 times daily, Disp: 180 tablet, Rfl: 1    TRULICITY 4.5 ON/3.3LH SOPN, inject 0.5 milliliters subcutaneously every week, Disp: , Rfl:     tamsulosin (FLOMAX) 0.4 MG capsule, , Disp: , Rfl:     oxyCODONE-acetaminophen (PERCOCET) 5-325 MG per tablet, as needed for Pain. , Disp: , Rfl:     cyclobenzaprine (FLEXERIL) 10 MG tablet, as needed for Muscle spasms , Disp: , Rfl:     metoprolol succinate (TOPROL XL) 25 MG extended release tablet, Take 1 tablet by mouth daily, Disp: 30 tablet, Rfl: 5    FARXIGA 10 MG tablet, TAKE 1 TABLET BY MOUTH EVERY DAY, Disp: , Rfl:     cetirizine (ZYRTEC) 10 MG tablet, Take 1 tablet by mouth daily, Disp: 30 tablet, Rfl: 1    atorvastatin (LIPITOR) 40 MG tablet, Take 40 mg by mouth daily, Disp: , Rfl:     insulin glargine (BASAGLAR KWIKPEN) 100 UNIT/ML injection pen, Inject 37 Units into the skin every morning , Disp: , Rfl:     metFORMIN (GLUCOPHAGE) 850 MG tablet, Take 850 mg by mouth 3 times daily , Disp: , Rfl:     glipiZIDE (GLUCOTROL) 5 MG tablet, Take 20 mg by mouth Taking 4 tabs q am, Disp: , Rfl:     No Known Allergies    Past Medical History:   Diagnosis Date    Abdominal pain     Ankle arthralgia     Depression     Diarrhea     Diffuse cervicobrachial syndrome     Hyperlipidemia     Hypertension     Low back pain     Neck pain     Neck sprain     Psychosexual dysfunction associated with inhibited libido     Spasm     Type 2 diabetes mellitus without complication [facility])        Past Surgical History:   Procedure Laterality Date    COLONOSCOPY      12/8/2017 normal     SHOULDER ARTHROSCOPY Right  VASECTOMY  2017       No family history on file. Social History     Socioeconomic History    Marital status:      Spouse name: Jamilah Kay Number of children: 2    Years of education: 13    Highest education level: Some college, no degree   Occupational History    Occupation:    Tobacco Use    Smoking status: Former Smoker     Years: 20.00     Quit date: 2020     Years since quittin.7    Smokeless tobacco: Former User     Types: Chew    Tobacco comment: 5-6 cigarettes qd, ocassional cigar   Vaping Use    Vaping Use: Never used   Substance and Sexual Activity    Alcohol use: Yes     Alcohol/week: 4.0 standard drinks     Types: 4 Cans of beer per week    Drug use: No    Sexual activity: Not on file   Other Topics Concern    Not on file   Social History Narrative    Not on file     Social Determinants of Health     Financial Resource Strain: Low Risk     Difficulty of Paying Living Expenses: Not hard at all   Food Insecurity: No Food Insecurity    Worried About [address] [employer] [employer] in the Last Year: Never true    [address] in the Last Year: Never true   Transportation Needs:     Lack of Transportation (Medical): Not on file    Lack of Transportation (Non-Medical):  Not on file   Physical Activity:     Days of Exercise per Week: Not on file    Minutes of Exercise per Session: Not on file   Stress:     Feeling of Stress : Not on file   Social Connections:     Frequency of Communication with Friends and Family: Not on file    Frequency of Social Gatherings with Friends and Family: Not on file    Attends [employer] Services: Not on file    Active Member of Clubs or Organizations: Not on file    Attends Club or Organization Meetings: Not on file    Marital Status: Not on file   Intimate Partner Violence:     Fear of Current or Ex-Partner: Not on file    Emotionally Abused: Not on file    Physically Abused: Not on file    Sexually Abused: Not on file Housing Stability:     Unable to Pay for Housing in the Last Year: Not on file    Number of Places Lived in the Last Year: Not on file    Unstable Housing in the Last Year: Not on file       Vitals:    05/13/22 1007   BP: 132/80   Site: Right Upper Arm   Position: Sitting   Pulse: 106   Temp: 97.6 °F (36.4 °C)   TempSrc: Temporal   SpO2: 98%   Weight: 255 lb (115.7 kg)       Exam:  Physical Exam  Constitutional:       Appearance: He is well-developed. HENT:      Head: Normocephalic and atraumatic. Right Ear: External ear normal.      Left Ear: External ear normal.   Eyes:      Pupils: Pupils are equal, round, and reactive to light. Neck:      Thyroid: No thyromegaly. Cardiovascular:      Rate and Rhythm: Regular rhythm. Tachycardia present. Heart sounds: Normal heart sounds. No murmur heard. Pulmonary:      Effort: Pulmonary effort is normal.      Breath sounds: Normal breath sounds. No wheezing or rales. Abdominal:      General: Bowel sounds are normal.      Palpations: Abdomen is soft. Tenderness: There is no abdominal tenderness. There is no guarding or rebound. Musculoskeletal:         General: Normal range of motion. Cervical back: Normal range of motion and neck supple. Lymphadenopathy:      Cervical: No cervical adenopathy. Skin:     General: Skin is warm and dry. Neurological:      Mental Status: He is alert and oriented to person, place, and time. Cranial Nerves: No cranial nerve deficit.    Psychiatric:         Judgment: Judgment normal.         Assessment and Plan:    Diagnoses and all orders for this visit:      Chronic allergic rhinitis  - not improving   - has tried antihistamine   - recommend floase or nasacort  - off Singulair   - declines pulm for poss asthma   - recommend to restart antihistamine and steroid nasal spray     Type 2 diabetes mellitus with complication, with long-term current use of insulin ([address]  - following with endocrinology  - now on Basaglar 37-40 units  - on glucotrol, metformin, jardiance and trulicity  - last J1N was 8.7 (5/2022)    On Statin   On ACE  follows with optho     Benign essential hypertension  - monitor blood pressure at home  - watch diet, low sodium   - on lisinopril   - on metoprolol   - Stable     Mixed hyperlipidemia  - watch diet   - on lipitor  - follow labs     Generalized osteoarthritis  - following with ortho  - s/p synvisc to knee   - stable    Chronic pain of both knees  - following with ortho   - s/p injections     Tobacco use  - Counseled to stop smoking   - stopped 8/2020     Erectile dysfunction, unspecified erectile dysfunction type  - continue present treatment  - poss related to DM and HTN  - stable    Tachycardia  - EKG   - cardiac monitor completed (3/22) -this rhythm, super ventricular ectopy, ventricular premature beats rare  - has seen Cardio   - add metoprolol     Return in about 3 months (around 8/13/2022) for check up and review and labs.     Orders Placed This Encounter   Procedures    Comprehensive Metabolic Panel     Standing Status:   Future     Standing Expiration Date:   5/13/2023    Magnesium     Standing Status:   Future     Standing Expiration Date:   5/13/2023    Lipid, Fasting     Standing Status:   Future     Standing Expiration Date:   5/13/2023    Hemoglobin A1C     Standing Status:   Future     Standing Expiration Date:   5/13/2023    Vitamin D 25 Hydroxy     Standing Status:   Future     Standing Expiration Date:   5/13/2023    TSH     Standing Status:   Future     Standing Expiration Date:   8/13/2022    Urinalysis     Standing Status:   Future     Standing Expiration Date:   5/13/2023    Microalbumin, Ur     Standing Status:   Future     Standing Expiration Date:   5/13/2023    CBC with Auto Differential     Standing Status:   Future     Standing Expiration Date:   5/13/2023     Requested Prescriptions     Signed Prescriptions Disp Refills    lisinopril (PRINIVIL;ZESTRIL) 20 MG tablet 180 tablet 1     Sig: Take 1 tablet by mouth 2 times daily       Thelma Pisano MD  5/13/2022  10:46 AM

## 2022-05-21 ENCOUNTER — OFFICE VISIT (OUTPATIENT)
Dept: FAMILY MEDICINE CLINIC | Age: 57
End: 2022-05-21
Payer: COMMERCIAL

## 2022-05-21 VITALS
WEIGHT: 257 LBS | HEIGHT: 70 IN | TEMPERATURE: 97.7 F | DIASTOLIC BLOOD PRESSURE: 86 MMHG | OXYGEN SATURATION: 97 % | HEART RATE: 107 BPM | SYSTOLIC BLOOD PRESSURE: 138 MMHG | BODY MASS INDEX: 36.79 KG/M2

## 2022-05-21 DIAGNOSIS — M72.2 PLANTAR FASCIITIS: Primary | ICD-10-CM

## 2022-05-21 DIAGNOSIS — M79.672 LEFT FOOT PAIN: ICD-10-CM

## 2022-05-21 PROCEDURE — 99213 OFFICE O/P EST LOW 20 MIN: CPT | Performed by: FAMILY MEDICINE

## 2022-05-21 ASSESSMENT — ENCOUNTER SYMPTOMS
SORE THROAT: 0
EYE PAIN: 0
DIARRHEA: 0
VOMITING: 0
SINUS PAIN: 0
BACK PAIN: 0
CONSTIPATION: 0
WHEEZING: 0
COUGH: 0
NAUSEA: 0
SHORTNESS OF BREATH: 0
ABDOMINAL PAIN: 0

## 2022-05-21 NOTE — PROGRESS NOTES
Job Dear (:  1965) is a 55 y.o. male,Established patient, here for evaluation of the following chief complaint(s): Foot Pain (left foot pain in bottom of foot for a few days now)         ASSESSMENT/PLAN:  1. Plantar fasciitis  Comments:  left foot, ice and motrin  exercises reviewed at length  if not any better in a week needs to see podiatry  2. Left foot pain  Comments:  treat as able  f/u in 1 to 2 weeks if not geting any better      Return if symptoms worsen or fail to improve. Subjective   SUBJECTIVE/OBJECTIVE:  Here with left foot pain  Does a ton of walking at work, like 7 to 8 miles a day in work boots  Got a new pair and has been wearing for the last 3 to 4 weeks with orthotics in them  A few days ago started with pain on the bottom of left foot and heel area  Had to go back to old boots but today any shoe hurts  Has not done anything yet for the pain    No injury  Or trauma  No pain like this in the past          Review of Systems   Constitutional: Negative for appetite change, fatigue and fever. HENT: Negative for congestion, ear pain, hearing loss, sinus pain and sore throat. Eyes: Negative for pain. Respiratory: Negative for cough, shortness of breath and wheezing. Cardiovascular: Negative for chest pain and leg swelling. Gastrointestinal: Negative for abdominal pain, constipation, diarrhea, nausea and vomiting. Endocrine: Negative for cold intolerance and heat intolerance. Genitourinary: Negative for difficulty urinating, hematuria, scrotal swelling, testicular pain and urgency. Musculoskeletal: Positive for arthralgias and myalgias. Negative for back pain and joint swelling. All left foot   Skin: Negative for rash and wound. Neurological: Negative for dizziness, syncope and light-headedness. Hematological: Negative for adenopathy. Psychiatric/Behavioral: Negative for confusion and sleep disturbance. The patient is not nervous/anxious. Objective   Physical Exam  Vitals and nursing note reviewed. Constitutional:       General: He is not in acute distress. Appearance: He is well-developed. He is not ill-appearing. HENT:      Head: Normocephalic and atraumatic. Eyes:      Pupils: Pupils are equal, round, and reactive to light. Cardiovascular:      Rate and Rhythm: Normal rate and regular rhythm. Pulmonary:      Effort: Pulmonary effort is normal.      Breath sounds: Normal breath sounds. Abdominal:      General: Bowel sounds are normal.   Musculoskeletal:      Cervical back: Normal range of motion. Comments: Left foot with significant tenderness over the left plantar muscle area right infront of the heel  No tenderness in midfoot or in toes, no tendernes in ankle   Skin:     General: Skin is warm and dry. Neurological:      Mental Status: He is alert and oriented to person, place, and time. Mental status is at baseline. Cranial Nerves: No cranial nerve deficit. Psychiatric:         Mood and Affect: Mood normal.         Thought Content: Thought content normal.                  An electronic signature was used to authenticate this note.     --Clorinda Kayser, DO

## 2022-06-30 ENCOUNTER — TELEPHONE (OUTPATIENT)
Dept: CARDIOLOGY | Age: 57
End: 2022-06-30

## 2022-06-30 NOTE — TELEPHONE ENCOUNTER
Called to schedule echo and patient stated he had the echo dome at [facility].   Electronically signed by Ksenia Alvarado on 6/30/2022 at 2:13 PM

## 2022-07-15 DIAGNOSIS — R00.0 TACHYCARDIA: ICD-10-CM

## 2022-07-15 DIAGNOSIS — I10 BENIGN ESSENTIAL HYPERTENSION: ICD-10-CM

## 2022-07-15 RX ORDER — METOPROLOL SUCCINATE 25 MG/1
25 TABLET, EXTENDED RELEASE ORAL DAILY
Qty: 30 TABLET | Refills: 5 | Status: SHIPPED
Start: 2022-07-15 | End: 2022-09-01 | Stop reason: SDUPTHER

## 2022-08-26 DIAGNOSIS — E55.9 VITAMIN D DEFICIENCY: ICD-10-CM

## 2022-08-26 DIAGNOSIS — E11.9 TYPE 2 DIABETES MELLITUS WITHOUT COMPLICATION, WITH LONG-TERM CURRENT USE OF INSULIN (HCC): ICD-10-CM

## 2022-08-26 DIAGNOSIS — E78.2 MIXED HYPERLIPIDEMIA: ICD-10-CM

## 2022-08-26 DIAGNOSIS — Z79.4 TYPE 2 DIABETES MELLITUS WITHOUT COMPLICATION, WITH LONG-TERM CURRENT USE OF INSULIN (HCC): ICD-10-CM

## 2022-08-26 LAB
ALBUMIN SERPL-MCNC: 4.6 G/DL (ref 3.5–5.2)
ALP BLD-CCNC: 108 U/L (ref 40–129)
ALT SERPL-CCNC: 39 U/L (ref 0–40)
ANION GAP SERPL CALCULATED.3IONS-SCNC: 14 MMOL/L (ref 7–16)
AST SERPL-CCNC: 25 U/L (ref 0–39)
BASOPHILS ABSOLUTE: 0.04 E9/L (ref 0–0.2)
BASOPHILS RELATIVE PERCENT: 0.5 % (ref 0–2)
BILIRUB SERPL-MCNC: 0.6 MG/DL (ref 0–1.2)
BILIRUBIN URINE: NEGATIVE
BLOOD, URINE: NEGATIVE
BUN BLDV-MCNC: 16 MG/DL (ref 6–20)
CALCIUM SERPL-MCNC: 9.5 MG/DL (ref 8.6–10.2)
CHLORIDE BLD-SCNC: 107 MMOL/L (ref 98–107)
CHOLESTEROL, FASTING: 157 MG/DL (ref 0–199)
CLARITY: CLEAR
CO2: 23 MMOL/L (ref 22–29)
COLOR: YELLOW
CREAT SERPL-MCNC: 0.8 MG/DL (ref 0.7–1.2)
EOSINOPHILS ABSOLUTE: 0.14 E9/L (ref 0.05–0.5)
EOSINOPHILS RELATIVE PERCENT: 1.7 % (ref 0–6)
GFR AFRICAN AMERICAN: >60
GFR NON-AFRICAN AMERICAN: >60 ML/MIN/1.73
GLUCOSE BLD-MCNC: 119 MG/DL (ref 74–99)
GLUCOSE URINE: >=1000 MG/DL
HBA1C MFR BLD: 8 % (ref 4–5.6)
HCT VFR BLD CALC: 44.8 % (ref 37–54)
HDLC SERPL-MCNC: 34 MG/DL
HEMOGLOBIN: 15.1 G/DL (ref 12.5–16.5)
IMMATURE GRANULOCYTES #: 0.05 E9/L
IMMATURE GRANULOCYTES %: 0.6 % (ref 0–5)
KETONES, URINE: NEGATIVE MG/DL
LDL CHOLESTEROL CALCULATED: 106 MG/DL (ref 0–99)
LEUKOCYTE ESTERASE, URINE: NEGATIVE
LYMPHOCYTES ABSOLUTE: 1.75 E9/L (ref 1.5–4)
LYMPHOCYTES RELATIVE PERCENT: 21.1 % (ref 20–42)
MAGNESIUM: 1.6 MG/DL (ref 1.6–2.6)
MCH RBC QN AUTO: 29.7 PG (ref 26–35)
MCHC RBC AUTO-ENTMCNC: 33.7 % (ref 32–34.5)
MCV RBC AUTO: 88 FL (ref 80–99.9)
MICROALBUMIN UR-MCNC: 47.9 MG/L
MONOCYTES ABSOLUTE: 0.63 E9/L (ref 0.1–0.95)
MONOCYTES RELATIVE PERCENT: 7.6 % (ref 2–12)
NEUTROPHILS ABSOLUTE: 5.7 E9/L (ref 1.8–7.3)
NEUTROPHILS RELATIVE PERCENT: 68.5 % (ref 43–80)
NITRITE, URINE: NEGATIVE
PDW BLD-RTO: 14 FL (ref 11.5–15)
PH UA: 5.5 (ref 5–9)
PLATELET # BLD: 254 E9/L (ref 130–450)
PMV BLD AUTO: 11 FL (ref 7–12)
POTASSIUM SERPL-SCNC: 4.5 MMOL/L (ref 3.5–5)
PROTEIN UA: NEGATIVE MG/DL
RBC # BLD: 5.09 E12/L (ref 3.8–5.8)
SODIUM BLD-SCNC: 144 MMOL/L (ref 132–146)
SPECIFIC GRAVITY UA: >=1.03 (ref 1–1.03)
TOTAL PROTEIN: 7.3 G/DL (ref 6.4–8.3)
TRIGLYCERIDE, FASTING: 87 MG/DL (ref 0–149)
TSH SERPL DL<=0.05 MIU/L-ACNC: 1.36 UIU/ML (ref 0.27–4.2)
UROBILINOGEN, URINE: 0.2 E.U./DL
VITAMIN D 25-HYDROXY: 36 NG/ML (ref 30–100)
VLDLC SERPL CALC-MCNC: 17 MG/DL
WBC # BLD: 8.3 E9/L (ref 4.5–11.5)

## 2022-08-27 ENCOUNTER — TELEPHONE (OUTPATIENT)
Dept: FAMILY MEDICINE CLINIC | Age: 57
End: 2022-08-27

## 2022-08-30 NOTE — TELEPHONE ENCOUNTER
Spoke with pt, states hes \"working and does not have time to talk\" also said he will just talk about results Thursday at appt.  Closing encounter

## 2022-09-01 ENCOUNTER — OFFICE VISIT (OUTPATIENT)
Dept: FAMILY MEDICINE CLINIC | Age: 57
End: 2022-09-01
Payer: COMMERCIAL

## 2022-09-01 VITALS
HEIGHT: 70 IN | RESPIRATION RATE: 20 BRPM | DIASTOLIC BLOOD PRESSURE: 84 MMHG | SYSTOLIC BLOOD PRESSURE: 126 MMHG | TEMPERATURE: 98.6 F | WEIGHT: 261 LBS | OXYGEN SATURATION: 97 % | HEART RATE: 93 BPM | BODY MASS INDEX: 37.37 KG/M2

## 2022-09-01 DIAGNOSIS — Z72.0 TOBACCO USE: ICD-10-CM

## 2022-09-01 DIAGNOSIS — J30.9 CHRONIC ALLERGIC RHINITIS: ICD-10-CM

## 2022-09-01 DIAGNOSIS — M25.561 CHRONIC PAIN OF BOTH KNEES: ICD-10-CM

## 2022-09-01 DIAGNOSIS — E55.9 VITAMIN D DEFICIENCY: ICD-10-CM

## 2022-09-01 DIAGNOSIS — E11.9 TYPE 2 DIABETES MELLITUS WITHOUT COMPLICATION, WITH LONG-TERM CURRENT USE OF INSULIN (HCC): Primary | ICD-10-CM

## 2022-09-01 DIAGNOSIS — Z79.4 TYPE 2 DIABETES MELLITUS WITHOUT COMPLICATION, WITH LONG-TERM CURRENT USE OF INSULIN (HCC): Primary | ICD-10-CM

## 2022-09-01 DIAGNOSIS — R00.0 TACHYCARDIA: ICD-10-CM

## 2022-09-01 DIAGNOSIS — R53.83 OTHER FATIGUE: ICD-10-CM

## 2022-09-01 DIAGNOSIS — Z12.5 SCREENING FOR MALIGNANT NEOPLASM OF PROSTATE: ICD-10-CM

## 2022-09-01 DIAGNOSIS — E78.2 MIXED HYPERLIPIDEMIA: ICD-10-CM

## 2022-09-01 DIAGNOSIS — M25.562 CHRONIC PAIN OF BOTH KNEES: ICD-10-CM

## 2022-09-01 DIAGNOSIS — I10 BENIGN ESSENTIAL HYPERTENSION: ICD-10-CM

## 2022-09-01 DIAGNOSIS — G89.29 CHRONIC PAIN OF BOTH KNEES: ICD-10-CM

## 2022-09-01 PROCEDURE — 99214 OFFICE O/P EST MOD 30 MIN: CPT | Performed by: INTERNAL MEDICINE

## 2022-09-01 PROCEDURE — 3052F HG A1C>EQUAL 8.0%<EQUAL 9.0%: CPT | Performed by: INTERNAL MEDICINE

## 2022-09-01 RX ORDER — LISINOPRIL 20 MG/1
20 TABLET ORAL 2 TIMES DAILY
Qty: 180 TABLET | Refills: 3 | Status: SHIPPED | OUTPATIENT
Start: 2022-09-01

## 2022-09-01 RX ORDER — METOPROLOL SUCCINATE 25 MG/1
25 TABLET, EXTENDED RELEASE ORAL DAILY
Qty: 90 TABLET | Refills: 3 | Status: SHIPPED | OUTPATIENT
Start: 2022-09-01

## 2022-09-01 SDOH — ECONOMIC STABILITY: FOOD INSECURITY: WITHIN THE PAST 12 MONTHS, YOU WORRIED THAT YOUR FOOD WOULD RUN OUT BEFORE YOU GOT MONEY TO BUY MORE.: PATIENT DECLINED

## 2022-09-01 SDOH — ECONOMIC STABILITY: FOOD INSECURITY: WITHIN THE PAST 12 MONTHS, THE FOOD YOU BOUGHT JUST DIDN'T LAST AND YOU DIDN'T HAVE MONEY TO GET MORE.: PATIENT DECLINED

## 2022-09-01 ASSESSMENT — ENCOUNTER SYMPTOMS
NAUSEA: 0
ABDOMINAL PAIN: 0
COUGH: 0
RHINORRHEA: 0
SHORTNESS OF BREATH: 0
VOMITING: 0
BLOOD IN STOOL: 0
CHEST TIGHTNESS: 0
EYE PAIN: 0
DIARRHEA: 0
CONSTIPATION: 0
BACK PAIN: 1
SORE THROAT: 0

## 2022-09-01 ASSESSMENT — PATIENT HEALTH QUESTIONNAIRE - PHQ9
SUM OF ALL RESPONSES TO PHQ QUESTIONS 1-9: 0
2. FEELING DOWN, DEPRESSED OR HOPELESS: 0
SUM OF ALL RESPONSES TO PHQ QUESTIONS 1-9: 0
1. LITTLE INTEREST OR PLEASURE IN DOING THINGS: 0
SUM OF ALL RESPONSES TO PHQ9 QUESTIONS 1 & 2: 0
SUM OF ALL RESPONSES TO PHQ QUESTIONS 1-9: 0
SUM OF ALL RESPONSES TO PHQ QUESTIONS 1-9: 0

## 2022-09-01 ASSESSMENT — SOCIAL DETERMINANTS OF HEALTH (SDOH): HOW HARD IS IT FOR YOU TO PAY FOR THE VERY BASICS LIKE FOOD, HOUSING, MEDICAL CARE, AND HEATING?: PATIENT DECLINED

## 2022-09-01 NOTE — LETTER
[address] [place] [address] Drive  [address] [place] [address]  Phone: [phone]  Fax: [phone]    Tanesha Gaspar MD        September 1, 2022     Patient: Florence Zhong   YOB: 1965   Date of Visit: 9/1/2022       To Whom It May Concern:    Please excuse Melisa Hernandez from work today due to a doctor's appointment. If you have any questions or concerns, please don't hesitate to call.     Sincerely,        Tanesha Gaspar MD

## 2022-09-01 NOTE — PROGRESS NOTES
[address]  Physicians   Internal Medicine     2022  Clovia Show : 1965 Sex: male  Age:56 y.o. Chief Complaint   Patient presents with    Follow-up    Diabetes    Cholesterol Problem    Hypertension    Foot Pain     Left foot        HPI:   Patient presents to office for evaluation of the following medical concerns. - States having left pain. States anterior aspect of the foot. Was previously having symptoms of plantar fascitis. States was doing home exercises. - States since last visit has had b/l knee replacement (2021). States doing well. competed physical therapy. [facility] during OR was concerned about elevated heart rate. No chest pain or discomfort. No syncope (did have one episode but sugar was low). No palpitations. No SOB. monitor (3/22) -this rhythm, super ventricular ectopy, ventricular premature beats rare    - States had urine retention post surgery. States was referred to urology. Last visit with urology per reviewed note () - urine retention, start flomax, check PSA () - 0.91    - States has chronic allergies. States taking allavert as needed. Off singulair, Has seen allergy in the past. still has nasal congestion and rhinorrhea. No ear pain. No facial pressure. No fever of chills. No chest pain, SOB. States cough. Has ProAir inhaler if needed, uses sparingly.    - States erectile issues are stable. States having difficulty maintaining erection. Stable. - States has Diabetes. Follows with endocrinology. Basaglar increased to 31 units and glipizide 5mg 4 tabs am and metformin tid. On jardiance 25mg daily . On trulicity. States some hypoglycemia. States follows with optho and podiatry exam. States sugar running 120-140. All < 200. States walking alot. last visit with endocrinology per reviewed note () -hemoglobin A1c 6.1, A1c may be falsely low due to surgery continue Trulicity Metformin for [employer] glipizide Basaglar to 37-41 unitsf/u .  Last A1c was 8.0 (8/2022)     - [facility] has high cholesterol.  was placed on Lipitor by endocrinology. States watches diet. - States high blood pressure. [facility] does not check blood pressure at home. On lisinopril. No reported side effects. improved today. - [facility] walks 8-10 miles per day at work    - labs from 8/26/2022 reviewed with patient 9/1/2022    Health Maintenance   - immunizations:   Influenza Vaccination - (11/12/2018), (2019), (2020)   Pneumonia Vaccination - declines   Shingles vaccine (shingrix) - declines  Tetanus Vaccination - (2009)   covid (2/25/2021) #1, (3/18/2021) # 2, (11/5/2021) #3 - pfizer     - Screenings:   Colonoscopy  - (12/2017) - normal - follow up 10yrs  Prostate     Couseled on Home Safety     ROS:  Review of Systems   Constitutional:  Negative for appetite change, chills, fever and unexpected weight change. HENT:  Negative for congestion, rhinorrhea and sore throat. Eyes:  Negative for pain and visual disturbance. Respiratory:  Negative for cough, chest tightness and shortness of breath. Cardiovascular:  Negative for chest pain and palpitations. Gastrointestinal:  Negative for abdominal pain, blood in stool, constipation, diarrhea, nausea and vomiting. Genitourinary:  Negative for difficulty urinating, dysuria, hematuria, scrotal swelling, testicular pain and urgency. Musculoskeletal:  Positive for arthralgias and back pain. Negative for gait problem. Skin:  Negative for rash. Neurological:  Negative for dizziness, syncope, weakness, light-headedness and headaches. Hematological:  Negative for adenopathy. Does not bruise/bleed easily. Psychiatric/Behavioral:  Negative for suicidal ideas. The patient is not nervous/anxious.         Current Outpatient Medications:     lisinopril (PRINIVIL;ZESTRIL) 20 MG tablet, Take 1 tablet by mouth 2 times daily, Disp: 180 tablet, Rfl: 3    metoprolol succinate (TOPROL XL) 25 MG extended release tablet, Take 1 tablet by mouth daily, Disp: 90 tablet, Rfl: 3    TRULICITY 4.5 GB/2.8DZ SOPN, inject 0.5 milliliters subcutaneously every week, Disp: , Rfl:     FARXIGA 10 MG tablet, TAKE 1 TABLET BY MOUTH EVERY DAY, Disp: , Rfl:     atorvastatin (LIPITOR) 40 MG tablet, Take 40 mg by mouth daily, Disp: , Rfl:     insulin glargine (LANTUS;BASAGLAR) 100 UNIT/ML injection pen, Inject 37 Units into the skin every morning , Disp: , Rfl:     metFORMIN (GLUCOPHAGE) 850 MG tablet, Take 850 mg by mouth 3 times daily , Disp: , Rfl:     glipiZIDE (GLUCOTROL) 5 MG tablet, Take 20 mg by mouth Taking 4 tabs q am, Disp: , Rfl:     No Known Allergies    Past Medical History:   Diagnosis Date    Abdominal pain     Ankle arthralgia     Depression     Diarrhea     Diffuse cervicobrachial syndrome     Hyperlipidemia     Hypertension     Low back pain     Neck pain     Neck sprain     Psychosexual dysfunction associated with inhibited libido     Spasm     Type 2 diabetes mellitus without complication [facility])        Past Surgical History:   Procedure Laterality Date    COLONOSCOPY      2017 normal     SHOULDER ARTHROSCOPY Right     VASECTOMY  2017       No family history on file. Social History     Socioeconomic History    Marital status:      Spouse name: Krysten    Number of children: 2    Years of education: 15    Highest education level: Some college, no degree   Occupational History    Occupation:    Tobacco Use    Smoking status: Former     Years: 20.00     Types: Cigarettes     Quit date: 2020     Years since quittin.0    Smokeless tobacco: Former     Types: Chew    Tobacco comments:     5-6 cigarettes qd, ocassional cigar   Vaping Use    Vaping Use: Never used   Substance and Sexual Activity    Alcohol use:  Yes     Alcohol/week: 4.0 standard drinks     Types: 4 Cans of beer per week    Drug use: No    Sexual activity: Not on file   Other Topics Concern    Not on file   Social History Narrative    Not on file     Social Determinants of Health     Financial Resource Strain: Unknown    Difficulty of Paying Living Expenses: Patient refused   Food Insecurity: Unknown    Worried About Running Out of Food in the Last Year: Patient refused    Ran Out of Food in the Last Year: Patient refused   Transportation Needs: Not on file   Physical Activity: Not on file   Stress: Not on file   Social Connections: Not on file   Intimate Partner Violence: Not on file   Housing Stability: Not on file       Vitals:    09/01/22 0903   BP: 126/84   Site: Left Upper Arm   Position: Sitting   Cuff Size: Large Adult   Pulse: 93   Resp: 20   Temp: 98.6 °F (37 °C)   TempSrc: Temporal   SpO2: 97%   Weight: 261 lb (118.4 kg)   Height: 5' 10\" (1.778 m)       Exam:  Physical Exam  Constitutional:       Appearance: He is well-developed. HENT:      Head: Normocephalic and atraumatic. Right Ear: External ear normal.      Left Ear: External ear normal.   Eyes:      Pupils: Pupils are equal, round, and reactive to light. Neck:      Thyroid: No thyromegaly. Cardiovascular:      Rate and Rhythm: Regular rhythm. Tachycardia present. Heart sounds: Normal heart sounds. No murmur heard. Pulmonary:      Effort: Pulmonary effort is normal.      Breath sounds: Normal breath sounds. No wheezing or rales. Abdominal:      General: Bowel sounds are normal.      Palpations: Abdomen is soft. Tenderness: There is no abdominal tenderness. There is no guarding or rebound. Musculoskeletal:         General: Normal range of motion. Cervical back: Normal range of motion and neck supple. Lymphadenopathy:      Cervical: No cervical adenopathy. Skin:     General: Skin is warm and dry. Neurological:      Mental Status: He is alert and oriented to person, place, and time. Cranial Nerves: No cranial nerve deficit.    Psychiatric:         Judgment: Judgment normal.       Assessment and Plan:    Diagnoses and all orders for this visit:    Chronic allergic rhinitis  - not improving   - has tried antihistamine   - recommend floase or nasacort  - off Singulair   - declines pulm for poss asthma   - recommend to restart antihistamine and steroid nasal spray     Type 2 diabetes mellitus with complication, with long-term current use of insulin ([address]  - following with endocrinology  - now on Basaglar 37-40 units  - on glucotrol, metformin, jardiance and trulicity  - last I9J was 8.0 (8/2022)    On Statin   On ACE  follows with optho     Benign essential hypertension  - monitor blood pressure at home  - watch diet, low sodium   - on lisinopril   - on metoprolol   - Stable     Mixed hyperlipidemia  - watch diet   - on lipitor  - follow labs   - last lab (8/2022) borderline elevated     Generalized osteoarthritis  - following with ortho  - s/p synvisc to knee   - stable    Chronic pain of both knees  - following with ortho   - s/p injections     Tobacco use  - Counseled to stop smoking   - stopped 8/2020     Erectile dysfunction, unspecified erectile dysfunction type  - continue present treatment  - poss related to DM and HTN  - stable    Tachycardia  - EKG   - cardiac monitor completed (3/22) -this rhythm, super ventricular ectopy, ventricular premature beats rare  - has seen Cardio   - add metoprolol     Return in about 6 months (around 3/1/2023) for check up and review and labs.     Orders Placed This Encounter   Procedures    CBC with Auto Differential     Standing Status:   Future     Standing Expiration Date:   9/1/2023    Comprehensive Metabolic Panel     Standing Status:   Future     Standing Expiration Date:   9/1/2023    Hemoglobin A1C     Standing Status:   Future     Standing Expiration Date:   9/1/2023    Magnesium     Standing Status:   Future     Standing Expiration Date:   9/1/2023    Lipid, Fasting     Standing Status:   Future     Standing Expiration Date:   9/1/2023    Vitamin D 25 Hydroxy     Standing Status:   Future     Standing Expiration Date:   9/1/2023 Urinalysis     Standing Status:   Future     Standing Expiration Date:   9/1/2023    TSH     Standing Status:   Future     Standing Expiration Date:   9/1/2023    PSA Screening     Standing Status:   Future     Standing Expiration Date:   9/1/2023    Microalbumin, Ur     Standing Status:   Future     Standing Expiration Date:   9/1/2023       Requested Prescriptions     Signed Prescriptions Disp Refills    lisinopril (PRINIVIL;ZESTRIL) 20 MG tablet 180 tablet 3     Sig: Take 1 tablet by mouth 2 times daily    metoprolol succinate (TOPROL XL) 25 MG extended release tablet 90 tablet 3     Sig: Take 1 tablet by mouth daily       Jhon Gonzalez MD  9/1/2022  9:34 AM

## 2022-11-09 ENCOUNTER — OFFICE VISIT (OUTPATIENT)
Dept: FAMILY MEDICINE CLINIC | Age: 57
End: 2022-11-09
Payer: COMMERCIAL

## 2022-11-09 ENCOUNTER — OFFICE VISIT (OUTPATIENT)
Dept: CHIROPRACTIC MEDICINE | Age: 57
End: 2022-11-09
Payer: COMMERCIAL

## 2022-11-09 ENCOUNTER — TELEPHONE (OUTPATIENT)
Dept: CHIROPRACTIC MEDICINE | Age: 57
End: 2022-11-09

## 2022-11-09 VITALS
SYSTOLIC BLOOD PRESSURE: 118 MMHG | BODY MASS INDEX: 37.22 KG/M2 | TEMPERATURE: 98.1 F | WEIGHT: 260 LBS | OXYGEN SATURATION: 97 % | HEART RATE: 101 BPM | DIASTOLIC BLOOD PRESSURE: 74 MMHG | HEIGHT: 70 IN

## 2022-11-09 VITALS
HEART RATE: 113 BPM | OXYGEN SATURATION: 95 % | WEIGHT: 261 LBS | BODY MASS INDEX: 37.37 KG/M2 | TEMPERATURE: 97.9 F | HEIGHT: 70 IN

## 2022-11-09 DIAGNOSIS — M54.2 NECK PAIN ON RIGHT SIDE: Primary | ICD-10-CM

## 2022-11-09 DIAGNOSIS — S16.1XXA ACUTE CERVICAL MYOFASCIAL STRAIN, INITIAL ENCOUNTER: Primary | ICD-10-CM

## 2022-11-09 PROCEDURE — 3074F SYST BP LT 130 MM HG: CPT | Performed by: PHYSICIAN ASSISTANT

## 2022-11-09 PROCEDURE — 99213 OFFICE O/P EST LOW 20 MIN: CPT | Performed by: CHIROPRACTOR

## 2022-11-09 PROCEDURE — 3078F DIAST BP <80 MM HG: CPT | Performed by: PHYSICIAN ASSISTANT

## 2022-11-09 PROCEDURE — 97140 MANUAL THERAPY 1/> REGIONS: CPT | Performed by: CHIROPRACTOR

## 2022-11-09 PROCEDURE — 97014 ELECTRIC STIMULATION THERAPY: CPT | Performed by: CHIROPRACTOR

## 2022-11-09 PROCEDURE — 96372 THER/PROPH/DIAG INJ SC/IM: CPT | Performed by: PHYSICIAN ASSISTANT

## 2022-11-09 PROCEDURE — 99214 OFFICE O/P EST MOD 30 MIN: CPT | Performed by: PHYSICIAN ASSISTANT

## 2022-11-09 RX ORDER — TIZANIDINE 4 MG/1
4 TABLET ORAL 4 TIMES DAILY PRN
Qty: 40 TABLET | Refills: 0 | Status: SHIPPED | OUTPATIENT
Start: 2022-11-09

## 2022-11-09 RX ORDER — METHYLPREDNISOLONE ACETATE 80 MG/ML
80 INJECTION, SUSPENSION INTRA-ARTICULAR; INTRALESIONAL; INTRAMUSCULAR; SOFT TISSUE ONCE
Status: COMPLETED | OUTPATIENT
Start: 2022-11-09 | End: 2022-11-09

## 2022-11-09 RX ORDER — KETOROLAC TROMETHAMINE 30 MG/ML
30 INJECTION, SOLUTION INTRAMUSCULAR; INTRAVENOUS ONCE
Status: COMPLETED | OUTPATIENT
Start: 2022-11-09 | End: 2022-11-09

## 2022-11-09 RX ADMIN — METHYLPREDNISOLONE ACETATE 80 MG: 80 INJECTION, SUSPENSION INTRA-ARTICULAR; INTRALESIONAL; INTRAMUSCULAR; SOFT TISSUE at 14:32

## 2022-11-09 RX ADMIN — KETOROLAC TROMETHAMINE 30 MG: 30 INJECTION, SOLUTION INTRAMUSCULAR; INTRAVENOUS at 14:33

## 2022-11-09 NOTE — PROGRESS NOTES
Patient is here for neck pain. Patient states 2 days ago he hit his head. Patient c/o of neck pain when coughing, sneezing and ROM.  Omega Mathew MD  Electronically signed by Danilo Redmond LPN on 18/9/0520 at 0:07 PM

## 2022-11-09 NOTE — PROGRESS NOTES
22  Jaleel Quiros : 1965 Sex: male  Age 65 y.o. Subjective:  Chief Complaint   Patient presents with    Neck Pain     Right side, pain and stiffness         HPI:   Jaleel Quiros , 65 y.o. male presents to express care for evaluation of right-sided neck pain, stiffness    HPI  52-year-old male presents to express care for evaluation of right-sided neck pain. The patient has had this right-sided neck pain ongoing for last couple of days. The patient states that he was getting out of the truck and may have bumped his head on the roof. The patient believes that he has created some pain to the right side of the neck. The patient try to see a chiropractor today. Unable to perform any significant manipulation due to the guarding of the area. The patient does have quite a bit of pain discomfort in the area. The patient denies any direct trauma to the area. ROS:   Unless otherwise stated in this report the patient's positive and negative responses for review of systems for constitutional, eyes, ENT, cardiovascular, respiratory, gastrointestinal, neurological, , musculoskeletal, and integument systems and related systems to the presenting problem are either stated in the history of present illness or were not pertinent or were negative for the symptoms and/or complaints related to the presenting medical problem. Positives and pertinent negatives as per HPI. All others reviewed and are negative.       PMH:     Past Medical History:   Diagnosis Date    Abdominal pain     Ankle arthralgia     Depression     Diarrhea     Diffuse cervicobrachial syndrome     Hyperlipidemia     Hypertension     Low back pain     Neck pain     Neck sprain     Psychosexual dysfunction associated with inhibited libido     Spasm     Type 2 diabetes mellitus without complication [facility])        Past Surgical History:   Procedure Laterality Date    COLONOSCOPY      2017 normal     SHOULDER ARTHROSCOPY Right VASECTOMY  2017       History reviewed. No pertinent family history. Medications:     Current Outpatient Medications:     tiZANidine (ZANAFLEX) 4 MG tablet, Take 1 tablet by mouth 4 times daily as needed (neck pain), Disp: 40 tablet, Rfl: 0    lisinopril (PRINIVIL;ZESTRIL) 20 MG tablet, Take 1 tablet by mouth 2 times daily, Disp: 180 tablet, Rfl: 3    metoprolol succinate (TOPROL XL) 25 MG extended release tablet, Take 1 tablet by mouth daily, Disp: 90 tablet, Rfl: 3    TRULICITY 4.5 DC/2.5QZ SOPN, inject 0.5 milliliters subcutaneously every week, Disp: , Rfl:     FARXIGA 10 MG tablet, TAKE 1 TABLET BY MOUTH EVERY DAY, Disp: , Rfl:     atorvastatin (LIPITOR) 40 MG tablet, Take 40 mg by mouth daily, Disp: , Rfl:     insulin glargine (LANTUS;BASAGLAR) 100 UNIT/ML injection pen, Inject 37 Units into the skin every morning , Disp: , Rfl:     metFORMIN (GLUCOPHAGE) 850 MG tablet, Take 850 mg by mouth 3 times daily , Disp: , Rfl:     glipiZIDE (GLUCOTROL) 5 MG tablet, Take 20 mg by mouth Taking 4 tabs q am, Disp: , Rfl:     Allergies:   No Known Allergies    Social History:     Social History     Tobacco Use    Smoking status: Former     Years: 20.00     Types: Cigarettes     Quit date: 2020     Years since quittin.2    Smokeless tobacco: Former     Types: Chew    Tobacco comments:     5-6 cigarettes qd, ocassional cigar   Vaping Use    Vaping Use: Never used   Substance Use Topics    Alcohol use: Yes     Alcohol/week: 4.0 standard drinks     Types: 4 Cans of beer per week    Drug use: No       Patient lives at home. Physical Exam:     Vitals:    22 1412   BP: 118/74   Pulse: (!) 101   Temp: 98.1 °F (36.7 °C)   SpO2: 97%   Weight: 260 lb (117.9 kg)   Height: 5' 10\" (1.778 m)       Exam:  Physical Exam  Nurses note and vital signs reviewed and patient is not hypoxic. General: The patient appears well and in no apparent distress. Patient is resting comfortably on cart.   Skin: Warm, dry, no pallor noted. There is no rash noted. Head: Normocephalic, atraumatic. Eye: Normal conjunctiva  Ears, Nose, Mouth, and Throat: Oral mucosa is moist  Neck: The patient has diffuse tenderness along the right paracervical musculature mostly into the proximal aspect of the right paracervical musculature. The patient had no masses detected. There is no midline tenderness, there is no left-sided tenderness. Patient did not really have much pain with flexion. Increased pain with extension and left and right lateral rotation. Respiratory: Patient is in no distress, no accessory muscle use  Back: Non-tender, no CVA tenderness bilaterally to percussion. Neurological: A&O x4, normal speech      Testing:           Medical Decision Making:     Vital signs reviewed    Past medical history reviewed. Allergies reviewed. Medications reviewed. Patient on arrival does not appear to be in any apparent distress or discomfort. The patient has been seen and evaluated. The patient does not appear to be toxic or lethargic. The patient will be treated with IM injection of methylprednisone, ketorolac. The patient will be given a tapering dose of prednisone and tizanidine for home. The patient is a diabetic. He will monitor his glucose and monitor and compensate with insulin    We did discuss going for x-ray images although would like to hold off at this point I feel is reasonable. No direct trauma to the neck. The patient is to return to express care or go directly to the emergency department should any of the signs or symptoms worsen. The patient is to followup with primary care physician in 2-3 days for repeat evaluation. The patient has no other questions or concerns at this time the patient will be discharged home. Clinical Impression:   Kalen Holman was seen today for neck pain.     Diagnoses and all orders for this visit:    Neck pain on right side    Other orders  -     methylPREDNISolone acetate (DEPO-MEDROL) injection 80 mg  -     ketorolac (TORADOL) injection 30 mg  -     tiZANidine (ZANAFLEX) 4 MG tablet; Take 1 tablet by mouth 4 times daily as needed (neck pain)      The patient is to call for any concerns or return if any of the signs or symptoms worsen. The patient is to follow-up with PCP in the next 2-3 days for repeat evaluation repeat assessment or go directly to the emergency department.      SIGNATURE: Nessa Varela III, PA-C

## 2022-11-09 NOTE — PROGRESS NOTES
22  Oretha Goldberg : 1965 Sex: male  Age: 65 y.o. Chief Complaint   Patient presents with    Neck Pain       HPI:   Back Pain  Patient presents for evaluation of neck pain and stiffness. I last saw Oretha Goldberg 19 months ago. The current symptoms have been present for a few days and include  right-sided neck pain . Getting out of buggy, bumped head on roof. Was wearing hardhat. No LOC. No arm pains. Cant turn head or extend  Tried some home massager, heat, ice etc without much relief. Took a vicodin left over from TKA. Didn't help much  TKA since LPV with Dr. Candance Loose, 2021    Current Outpatient Medications:     lisinopril (PRINIVIL;ZESTRIL) 20 MG tablet, Take 1 tablet by mouth 2 times daily, Disp: 180 tablet, Rfl: 3    metoprolol succinate (TOPROL XL) 25 MG extended release tablet, Take 1 tablet by mouth daily, Disp: 90 tablet, Rfl: 3    TRULICITY 4.5 AF/2.0NY SOPN, inject 0.5 milliliters subcutaneously every week, Disp: , Rfl:     FARXIGA 10 MG tablet, TAKE 1 TABLET BY MOUTH EVERY DAY, Disp: , Rfl:     atorvastatin (LIPITOR) 40 MG tablet, Take 40 mg by mouth daily, Disp: , Rfl:     insulin glargine (LANTUS;BASAGLAR) 100 UNIT/ML injection pen, Inject 37 Units into the skin every morning , Disp: , Rfl:     metFORMIN (GLUCOPHAGE) 850 MG tablet, Take 850 mg by mouth 3 times daily , Disp: , Rfl:     glipiZIDE (GLUCOTROL) 5 MG tablet, Take 20 mg by mouth Taking 4 tabs q am, Disp: , Rfl:     Exam:   Vitals:    22 1330   Pulse: (!) 113   Temp: 97.9 °F (36.6 °C)   SpO2: 95%     No acute distress. Alert and oriented x3. Ambulating without assistance. Cervical AROM:  Flexion: 50 /50  Extension: 30 /60 with End Range Pain  Right lateral flexion: 20/45 with ERP  Left lateral flexion: 20/45 with ERP  Right Rotation: 30/80 with ERP  Left Rotation:  30/80 with ERP  The neck is supple. Nontender in the midline.   Active trigger point right cervical paraspinal reproducing his chief complaint. Posture within normal limits    Reflexes were +2 and symmetrical at the C5, C6 and C7 indicators. Manual muscle testing revealed 5/5 strength throughout the upper extremity indicator muscles  Sensation to light touch is WNL throughout the upper extremity dermatomes  Radial pulses are 2/4 bilateral.  Capillary refill brisk at the fingers. Be's and Tromner's are absent    Cervical Compression Test: negative  Cervical Distraction Test:negative  Foraminal Compression Test:negative  Maximum Cervical Compression Test:negative    Taut and Tender fibers noted in the cervical and thoracic paraspinals. Trigger points in the bilateral trapezius. Joint fixation with motion screening at: T4-6, C4-5      Daren was seen today for neck pain. Diagnoses and all orders for this visit:    Acute cervical myofascial strain, initial encounter      Treatment Plan: Started back in some conservative care today. EMS with heat to the cervical region for 15 minutes to address muscle spasm/hypertension and alleviate pain. Manual therapy was applied to the cervical spine today and thoracic spine for 12 minutes-150-2:02 PM.  This consisted of mobilization, trigger point therapy. Unable to perform diversified manipulation to the affected segments due to his guarding. Treatment did provide some mild increase in range of motion particularly in left rotation and bilateral lateral flexion, but still had severe limitation of right rotation. Offered him express care today to discuss medications such as a muscle relaxer which may be of benefit. He will see them following. I will likely see him back early next week for further care.       Seen By:  Azeb Dong DC

## 2022-11-09 NOTE — TELEPHONE ENCOUNTER
Pre-service is calling in, Pt is requesting an appointment for today. Patient states stiff neck and back.  Please advise

## 2022-11-14 ENCOUNTER — OFFICE VISIT (OUTPATIENT)
Dept: CHIROPRACTIC MEDICINE | Age: 57
End: 2022-11-14
Payer: COMMERCIAL

## 2022-11-14 ENCOUNTER — TELEPHONE (OUTPATIENT)
Dept: ADMINISTRATIVE | Age: 57
End: 2022-11-14

## 2022-11-14 VITALS — OXYGEN SATURATION: 97 % | TEMPERATURE: 97.2 F | HEART RATE: 111 BPM

## 2022-11-14 DIAGNOSIS — S16.1XXA ACUTE CERVICAL MYOFASCIAL STRAIN, INITIAL ENCOUNTER: Primary | ICD-10-CM

## 2022-11-14 PROCEDURE — 99999 PR OFFICE/OUTPT VISIT,PROCEDURE ONLY: CPT | Performed by: CHIROPRACTOR

## 2022-11-14 PROCEDURE — 97014 ELECTRIC STIMULATION THERAPY: CPT | Performed by: CHIROPRACTOR

## 2022-11-14 PROCEDURE — 98940 CHIROPRACT MANJ 1-2 REGIONS: CPT | Performed by: CHIROPRACTOR

## 2022-11-14 NOTE — TELEPHONE ENCOUNTER
Pt called and said he was seen last week and was told to call  if he was not any better and could be seen today. He said last week his neck was swollen and not able to be worked on and was given an anti-inflammatory. Staff unavailable due to pt care. Please contact pt to schedule.

## 2022-11-14 NOTE — PROGRESS NOTES
22  Tony Mckeon : 1965 Sex: male  Age: 65 y.o. Chief Complaint   Patient presents with    Neck Pain       HPI:   Pain has improved. On average, pain is perceived as moderate (4-6 pain scale). Patient denies new numbness, new weakness, new tingling  [facility] after seeing me the other day. Shot and medications. Started doing better  Then on Saturday he saw his wife's chiro -  Jovani Carrero) - treated - worse  Did work today  Better ROM. No arm pains  Taking zanaflex      Current Outpatient Medications:     tiZANidine (ZANAFLEX) 4 MG tablet, Take 1 tablet by mouth 4 times daily as needed (neck pain), Disp: 40 tablet, Rfl: 0    lisinopril (PRINIVIL;ZESTRIL) 20 MG tablet, Take 1 tablet by mouth 2 times daily, Disp: 180 tablet, Rfl: 3    metoprolol succinate (TOPROL XL) 25 MG extended release tablet, Take 1 tablet by mouth daily, Disp: 90 tablet, Rfl: 3    TRULICITY 4.5 KS/0.9NU SOPN, inject 0.5 milliliters subcutaneously every week, Disp: , Rfl:     FARXIGA 10 MG tablet, TAKE 1 TABLET BY MOUTH EVERY DAY, Disp: , Rfl:     atorvastatin (LIPITOR) 40 MG tablet, Take 40 mg by mouth daily, Disp: , Rfl:     insulin glargine (LANTUS;BASAGLAR) 100 UNIT/ML injection pen, Inject 37 Units into the skin every morning , Disp: , Rfl:     metFORMIN (GLUCOPHAGE) 850 MG tablet, Take 850 mg by mouth 3 times daily , Disp: , Rfl:     glipiZIDE (GLUCOTROL) 5 MG tablet, Take 20 mg by mouth Taking 4 tabs q am, Disp: , Rfl:     Exam:   Vitals:    22 1356   Pulse: (!) 111   Temp: 97.2 °F (36.2 °C)   SpO2: 97%     Cervical AROM improved overall, but still with some limitation particularly in rotation b/l. No midline pain. There are hypertonic and tender fibers noted with palpation in the paraspinal muscles of the cervical thoracic region. Joint fixation is noted with motion screening at C5-6, T3-5. Fernando Molina was seen today for neck pain.     Diagnoses and all orders for this visit:    Acute cervical myofascial strain, initial encounter      Treatment Plan:  EMS with heat to the cervical region for 15 minutes to address muscle spasm/hypertension and alleviate pain. Diversified manipulation to listed cervical segments. Tolerated well noting relief following care. With his work schedule, is difficult for him to make appointments. He is going to contact us later in the week if he feels he needs additional care.   We will continue with the instructions of his prescribing physician regarding the muscle relaxer        Seen By:  Tiana Catalan DC

## 2022-12-15 LAB
AVERAGE GLUCOSE: NORMAL
HBA1C MFR BLD: 8 %

## 2022-12-19 ENCOUNTER — OFFICE VISIT (OUTPATIENT)
Dept: FAMILY MEDICINE CLINIC | Age: 57
End: 2022-12-19

## 2022-12-19 VITALS
OXYGEN SATURATION: 99 % | HEIGHT: 70 IN | TEMPERATURE: 98 F | HEART RATE: 86 BPM | RESPIRATION RATE: 20 BRPM | BODY MASS INDEX: 37.37 KG/M2 | DIASTOLIC BLOOD PRESSURE: 94 MMHG | SYSTOLIC BLOOD PRESSURE: 134 MMHG | WEIGHT: 261 LBS

## 2022-12-19 DIAGNOSIS — J06.9 ACUTE UPPER RESPIRATORY INFECTION: Primary | ICD-10-CM

## 2022-12-19 RX ORDER — TRIAMCINOLONE ACETONIDE 40 MG/ML
40 INJECTION, SUSPENSION INTRA-ARTICULAR; INTRAMUSCULAR ONCE
Status: COMPLETED | OUTPATIENT
Start: 2022-12-19 | End: 2022-12-19

## 2022-12-19 RX ORDER — BENZONATATE 100 MG/1
100 CAPSULE ORAL 3 TIMES DAILY PRN
Qty: 30 CAPSULE | Refills: 0 | Status: SHIPPED | OUTPATIENT
Start: 2022-12-19 | End: 2022-12-26

## 2022-12-19 RX ORDER — METHYLPREDNISOLONE 4 MG/1
TABLET ORAL
Qty: 1 KIT | Refills: 0 | Status: SHIPPED | OUTPATIENT
Start: 2022-12-19 | End: 2022-12-25

## 2022-12-19 RX ORDER — ALBUTEROL SULFATE 90 UG/1
2 AEROSOL, METERED RESPIRATORY (INHALATION) 4 TIMES DAILY PRN
Qty: 18 G | Refills: 0 | Status: SHIPPED | OUTPATIENT
Start: 2022-12-19

## 2022-12-19 RX ORDER — TIRZEPATIDE 5 MG/.5ML
INJECTION, SOLUTION SUBCUTANEOUS
COMMUNITY

## 2022-12-19 RX ORDER — CEFDINIR 300 MG/1
300 CAPSULE ORAL 2 TIMES DAILY
Qty: 14 CAPSULE | Refills: 0 | Status: SHIPPED | OUTPATIENT
Start: 2022-12-19 | End: 2022-12-26

## 2022-12-19 RX ADMIN — TRIAMCINOLONE ACETONIDE 40 MG: 40 INJECTION, SUSPENSION INTRA-ARTICULAR; INTRAMUSCULAR at 15:20

## 2022-12-19 NOTE — PROGRESS NOTES
22  Denisse Talbot : 1965 Sex: male  Age 65 y.o. Chief Complaint   Patient presents with    Cough     X 2 days    Chest Congestion     X 2 days    Nasal Congestion     X 2 days    Generalized Body Aches       HPI: The patient states that they have had sinus pressure and congestion for the last 2-3 days. The patient denies facial pressure. Admits to minimal cough which is productive of sputum, clear. The patient also reports nasal congestion and mild sore throat. States nasal discharge. The patient has had general malaise. Some myalgia. Mild headache, improved. Denies subjective fever and chills. No loss of taste or smell. Patient denies decreased appetite. No dizziness, visual disturbances or neck stiffness. No chest pain or dyspnea. No nausea, vomiting. States some abo pian and diarrhea, resolved. States wife was ill with similar to his symptoms. States has been taking dayquil and nyquil and albuterol inhaler. Sates many coworkers ill at work. The patient presents for evaluation. ROS:  Const: Positives and pertinent negatives as per HPI. All others reviewed and are negative.         Current Outpatient Medications:     Tirzepatide (MOUNJARO) 5 MG/0.5ML SOPN SC injection, Mounjaro 5 mg/0.5 mL subcutaneous pen injector  INJECT 5 MG BY SUBCUTANEOUS ROUTE ONCE WEEKLY, Disp: , Rfl:     cefdinir (OMNICEF) 300 MG capsule, Take 1 capsule by mouth 2 times daily for 7 days, Disp: 14 capsule, Rfl: 0    methylPREDNISolone (MEDROL DOSEPACK) 4 MG tablet, Take by mouth., Disp: 1 kit, Rfl: 0    benzonatate (TESSALON) 100 MG capsule, Take 1 capsule by mouth 3 times daily as needed for Cough, Disp: 30 capsule, Rfl: 0    albuterol sulfate HFA (VENTOLIN HFA) 108 (90 Base) MCG/ACT inhaler, Inhale 2 puffs into the lungs 4 times daily as needed for Wheezing, Disp: 18 g, Rfl: 0    tiZANidine (ZANAFLEX) 4 MG tablet, Take 1 tablet by mouth 4 times daily as needed (neck pain), Disp: 40 tablet, Rfl: 0 lisinopril (PRINIVIL;ZESTRIL) 20 MG tablet, Take 1 tablet by mouth 2 times daily, Disp: 180 tablet, Rfl: 3    metoprolol succinate (TOPROL XL) 25 MG extended release tablet, Take 1 tablet by mouth daily, Disp: 90 tablet, Rfl: 3    FARXIGA 10 MG tablet, TAKE 1 TABLET BY MOUTH EVERY DAY, Disp: , Rfl:     atorvastatin (LIPITOR) 40 MG tablet, Take 40 mg by mouth daily, Disp: , Rfl:     insulin glargine (LANTUS;BASAGLAR) 100 UNIT/ML injection pen, Inject 37 Units into the skin every morning , Disp: , Rfl:     metFORMIN (GLUCOPHAGE) 850 MG tablet, Take 850 mg by mouth 3 times daily , Disp: , Rfl:     glipiZIDE (GLUCOTROL) 5 MG tablet, Take 20 mg by mouth Taking 4 tabs q am, Disp: , Rfl:   No Known Allergies    Past Medical History:   Diagnosis Date    Abdominal pain     Ankle arthralgia     Depression     Diarrhea     Diffuse cervicobrachial syndrome     Hyperlipidemia     Hypertension     Low back pain     Neck pain     Neck sprain     Psychosexual dysfunction associated with inhibited libido     Spasm     Type 2 diabetes mellitus without complication [facility])      Past Surgical History:   Procedure Laterality Date    COLONOSCOPY      2017 normal     SHOULDER ARTHROSCOPY Right     VASECTOMY  2017     No family history on file. Social History     Socioeconomic History    Marital status:      Spouse name: Krysten    Number of children: 2    Years of education: 15    Highest education level: Some college, no degree   Occupational History    Occupation:    Tobacco Use    Smoking status: Former     Years: 20.00     Types: Cigarettes     Quit date: 2020     Years since quittin.3    Smokeless tobacco: Former     Types: Chew    Tobacco comments:     5-6 cigarettes qd, ocassional cigar   Vaping Use    Vaping Use: Never used   Substance and Sexual Activity    Alcohol use:  Yes     Alcohol/week: 4.0 standard drinks     Types: 4 Cans of beer per week    Drug use: No    Sexual activity: Not on file   Other Topics Concern    Not on file   Social History Narrative    Not on file     Social Determinants of Health     Financial Resource Strain: Unknown    Difficulty of Paying Living Expenses: Patient refused   Food Insecurity: Unknown    Worried About Running Out of Food in the Last Year: Patient refused    Ran Out of Food in the Last Year: Patient refused   Transportation Needs: Not on file   Physical Activity: Not on file   Stress: Not on file   Social Connections: Not on file   Intimate Partner Violence: Not on file   Housing Stability: Not on file       Vitals:    12/19/22 1441   BP: (!) 134/94   Site: Left Upper Arm   Position: Sitting   Cuff Size: Large Adult   Pulse: 86   Resp: 20   Temp: 98 °F (36.7 °C)   TempSrc: Temporal   SpO2: 99%   Weight: 261 lb (118.4 kg)   Height: 5' 10\" (1.778 m)       Exam:  Physical Exam    Assessment and Plan:  Marylene Fried was seen today for cough, chest congestion, nasal congestion and generalized body aches. Diagnoses and all orders for this visit:    Acute upper respiratory infection  Comments:  antihistamine   steroid nasal spray   kenalog 40mg IM x 1   medrol dose pack - advised to monitor sugar closely    Orders:  -     cefdinir (OMNICEF) 300 MG capsule; Take 1 capsule by mouth 2 times daily for 7 days  -     methylPREDNISolone (MEDROL DOSEPACK) 4 MG tablet; Take by mouth. -     triamcinolone acetonide (KENALOG-40) injection 40 mg  -     benzonatate (TESSALON) 100 MG capsule; Take 1 capsule by mouth 3 times daily as needed for Cough  -     albuterol sulfate HFA (VENTOLIN HFA) 108 (90 Base) MCG/ACT inhaler; Inhale 2 puffs into the lungs 4 times daily as needed for Wheezing    Follow-up with your primary care provider in 7-10 days for re-evaluation and further management. Return  sooner or go to the Emergency Department immediately if your condition changes or worsens. Return for as scheduled, check up and review.       Dany Palmer MD

## 2022-12-27 ENCOUNTER — TELEPHONE (OUTPATIENT)
Dept: FAMILY MEDICINE CLINIC | Age: 57
End: 2022-12-27

## 2022-12-27 RX ORDER — DOXYCYCLINE HYCLATE 100 MG
100 TABLET ORAL 2 TIMES DAILY
Qty: 20 TABLET | Refills: 0 | Status: SHIPPED | OUTPATIENT
Start: 2022-12-27 | End: 2023-01-06

## 2022-12-27 NOTE — TELEPHONE ENCOUNTER
Patient was not able to hear me when I tried calling him twice. I asked patient to call the office back to see if the connection is better.

## 2022-12-27 NOTE — TELEPHONE ENCOUNTER
Last dose of ATB and medrol pack completed yesterday. Continues with URI symptoms but now his ears are itchy and glands are swollen. He would like to know what he should do about this?  He uses [employer] in [address]

## 2022-12-27 NOTE — TELEPHONE ENCOUNTER
Requested Prescriptions     Signed Prescriptions Disp Refills    doxycycline hyclate (VIBRA-TABS) 100 MG tablet 20 tablet 0     Sig: Take 1 tablet by mouth 2 times daily for 10 days     Authorizing Provider: Kelly Tony

## 2023-03-02 ENCOUNTER — TELEPHONE (OUTPATIENT)
Dept: FAMILY MEDICINE CLINIC | Age: 58
End: 2023-03-02

## 2023-03-02 ENCOUNTER — OFFICE VISIT (OUTPATIENT)
Dept: FAMILY MEDICINE CLINIC | Age: 58
End: 2023-03-02
Payer: COMMERCIAL

## 2023-03-02 VITALS
BODY MASS INDEX: 36.36 KG/M2 | TEMPERATURE: 97.9 F | SYSTOLIC BLOOD PRESSURE: 116 MMHG | HEIGHT: 70 IN | WEIGHT: 254 LBS | OXYGEN SATURATION: 95 % | RESPIRATION RATE: 18 BRPM | DIASTOLIC BLOOD PRESSURE: 80 MMHG | HEART RATE: 105 BPM

## 2023-03-02 DIAGNOSIS — Z12.5 SCREENING FOR MALIGNANT NEOPLASM OF PROSTATE: ICD-10-CM

## 2023-03-02 DIAGNOSIS — E11.9 TYPE 2 DIABETES MELLITUS WITHOUT COMPLICATION, WITH LONG-TERM CURRENT USE OF INSULIN (HCC): ICD-10-CM

## 2023-03-02 DIAGNOSIS — Z79.4 TYPE 2 DIABETES MELLITUS WITHOUT COMPLICATION, WITH LONG-TERM CURRENT USE OF INSULIN (HCC): ICD-10-CM

## 2023-03-02 DIAGNOSIS — R00.0 TACHYCARDIA: ICD-10-CM

## 2023-03-02 DIAGNOSIS — E78.2 MIXED HYPERLIPIDEMIA: ICD-10-CM

## 2023-03-02 DIAGNOSIS — Z72.0 TOBACCO USE: ICD-10-CM

## 2023-03-02 DIAGNOSIS — M25.561 CHRONIC PAIN OF BOTH KNEES: ICD-10-CM

## 2023-03-02 DIAGNOSIS — J30.9 CHRONIC ALLERGIC RHINITIS: Primary | ICD-10-CM

## 2023-03-02 DIAGNOSIS — E55.9 VITAMIN D DEFICIENCY: ICD-10-CM

## 2023-03-02 DIAGNOSIS — M25.562 CHRONIC PAIN OF BOTH KNEES: ICD-10-CM

## 2023-03-02 DIAGNOSIS — R53.83 OTHER FATIGUE: ICD-10-CM

## 2023-03-02 DIAGNOSIS — G89.29 CHRONIC PAIN OF BOTH KNEES: ICD-10-CM

## 2023-03-02 DIAGNOSIS — I10 BENIGN ESSENTIAL HYPERTENSION: ICD-10-CM

## 2023-03-02 LAB
ALBUMIN SERPL-MCNC: 4.6 G/DL (ref 3.5–5.2)
ALP BLD-CCNC: 92 U/L (ref 40–129)
ALT SERPL-CCNC: 25 U/L (ref 0–40)
ANION GAP SERPL CALCULATED.3IONS-SCNC: 16 MMOL/L (ref 7–16)
AST SERPL-CCNC: 22 U/L (ref 0–39)
BASOPHILS ABSOLUTE: 0.06 E9/L (ref 0–0.2)
BASOPHILS RELATIVE PERCENT: 0.7 % (ref 0–2)
BILIRUB SERPL-MCNC: 0.5 MG/DL (ref 0–1.2)
BILIRUBIN URINE: NEGATIVE
BLOOD, URINE: NEGATIVE
BUN BLDV-MCNC: 14 MG/DL (ref 6–20)
CALCIUM SERPL-MCNC: 9.4 MG/DL (ref 8.6–10.2)
CHLORIDE BLD-SCNC: 104 MMOL/L (ref 98–107)
CHOLESTEROL, FASTING: 120 MG/DL (ref 0–199)
CLARITY: CLEAR
CO2: 23 MMOL/L (ref 22–29)
COLOR: YELLOW
CREAT SERPL-MCNC: 0.7 MG/DL (ref 0.7–1.2)
EOSINOPHILS ABSOLUTE: 0.13 E9/L (ref 0.05–0.5)
EOSINOPHILS RELATIVE PERCENT: 1.5 % (ref 0–6)
GFR SERPL CREATININE-BSD FRML MDRD: >60 ML/MIN/1.73
GLUCOSE BLD-MCNC: 108 MG/DL (ref 74–99)
GLUCOSE URINE: >=1000 MG/DL
HBA1C MFR BLD: 7.9 % (ref 4–5.6)
HCT VFR BLD CALC: 47 % (ref 37–54)
HDLC SERPL-MCNC: 28 MG/DL
HEMOGLOBIN: 15.9 G/DL (ref 12.5–16.5)
IMMATURE GRANULOCYTES #: 0.05 E9/L
IMMATURE GRANULOCYTES %: 0.6 % (ref 0–5)
KETONES, URINE: NEGATIVE MG/DL
LDL CHOLESTEROL CALCULATED: 74 MG/DL (ref 0–99)
LEUKOCYTE ESTERASE, URINE: NEGATIVE
LYMPHOCYTES ABSOLUTE: 2.02 E9/L (ref 1.5–4)
LYMPHOCYTES RELATIVE PERCENT: 23.8 % (ref 20–42)
MAGNESIUM: 1.4 MG/DL (ref 1.6–2.6)
MCH RBC QN AUTO: 30 PG (ref 26–35)
MCHC RBC AUTO-ENTMCNC: 33.8 % (ref 32–34.5)
MCV RBC AUTO: 88.7 FL (ref 80–99.9)
MICROALBUMIN UR-MCNC: 14.8 MG/L
MONOCYTES ABSOLUTE: 0.6 E9/L (ref 0.1–0.95)
MONOCYTES RELATIVE PERCENT: 7.1 % (ref 2–12)
NEUTROPHILS ABSOLUTE: 5.61 E9/L (ref 1.8–7.3)
NEUTROPHILS RELATIVE PERCENT: 66.3 % (ref 43–80)
NITRITE, URINE: NEGATIVE
PDW BLD-RTO: 13.9 FL (ref 11.5–15)
PH UA: 5.5 (ref 5–9)
PLATELET # BLD: 272 E9/L (ref 130–450)
PMV BLD AUTO: 11.1 FL (ref 7–12)
POTASSIUM SERPL-SCNC: 4.3 MMOL/L (ref 3.5–5)
PROSTATE SPECIFIC ANTIGEN: 0.72 NG/ML (ref 0–4)
PROTEIN UA: NEGATIVE MG/DL
RBC # BLD: 5.3 E12/L (ref 3.8–5.8)
SODIUM BLD-SCNC: 143 MMOL/L (ref 132–146)
SPECIFIC GRAVITY UA: 1.01 (ref 1–1.03)
TOTAL PROTEIN: 7.4 G/DL (ref 6.4–8.3)
TRIGLYCERIDE, FASTING: 89 MG/DL (ref 0–149)
TSH SERPL DL<=0.05 MIU/L-ACNC: 0.88 UIU/ML (ref 0.27–4.2)
UROBILINOGEN, URINE: 0.2 E.U./DL
VITAMIN D 25-HYDROXY: 43 NG/ML (ref 30–100)
VLDLC SERPL CALC-MCNC: 18 MG/DL
WBC # BLD: 8.5 E9/L (ref 4.5–11.5)

## 2023-03-02 PROCEDURE — 3079F DIAST BP 80-89 MM HG: CPT | Performed by: INTERNAL MEDICINE

## 2023-03-02 PROCEDURE — 99213 OFFICE O/P EST LOW 20 MIN: CPT | Performed by: INTERNAL MEDICINE

## 2023-03-02 PROCEDURE — 3074F SYST BP LT 130 MM HG: CPT | Performed by: INTERNAL MEDICINE

## 2023-03-02 RX ORDER — ALBUTEROL SULFATE 90 UG/1
2 AEROSOL, METERED RESPIRATORY (INHALATION) 4 TIMES DAILY PRN
Qty: 18 G | Refills: 0 | Status: SHIPPED | OUTPATIENT
Start: 2023-03-02

## 2023-03-02 SDOH — ECONOMIC STABILITY: HOUSING INSECURITY
IN THE LAST 12 MONTHS, WAS THERE A TIME WHEN YOU DID NOT HAVE A STEADY PLACE TO SLEEP OR SLEPT IN A SHELTER (INCLUDING NOW)?: PATIENT REFUSED

## 2023-03-02 SDOH — ECONOMIC STABILITY: FOOD INSECURITY: WITHIN THE PAST 12 MONTHS, YOU WORRIED THAT YOUR FOOD WOULD RUN OUT BEFORE YOU GOT MONEY TO BUY MORE.: PATIENT DECLINED

## 2023-03-02 SDOH — ECONOMIC STABILITY: INCOME INSECURITY: HOW HARD IS IT FOR YOU TO PAY FOR THE VERY BASICS LIKE FOOD, HOUSING, MEDICAL CARE, AND HEATING?: PATIENT DECLINED

## 2023-03-02 SDOH — ECONOMIC STABILITY: FOOD INSECURITY: WITHIN THE PAST 12 MONTHS, THE FOOD YOU BOUGHT JUST DIDN'T LAST AND YOU DIDN'T HAVE MONEY TO GET MORE.: PATIENT DECLINED

## 2023-03-02 ASSESSMENT — ENCOUNTER SYMPTOMS
ABDOMINAL PAIN: 0
BACK PAIN: 1
SORE THROAT: 0
COUGH: 0
BLOOD IN STOOL: 0
CHEST TIGHTNESS: 0
DIARRHEA: 0
CONSTIPATION: 0
RHINORRHEA: 0
NAUSEA: 0
VOMITING: 0
SHORTNESS OF BREATH: 0
EYE PAIN: 0

## 2023-03-02 ASSESSMENT — PATIENT HEALTH QUESTIONNAIRE - PHQ9
SUM OF ALL RESPONSES TO PHQ9 QUESTIONS 1 & 2: 0
SUM OF ALL RESPONSES TO PHQ QUESTIONS 1-9: 0
2. FEELING DOWN, DEPRESSED OR HOPELESS: 0
1. LITTLE INTEREST OR PLEASURE IN DOING THINGS: 0
SUM OF ALL RESPONSES TO PHQ QUESTIONS 1-9: 0

## 2023-03-02 NOTE — TELEPHONE ENCOUNTER
Please let the patient know that blood work results showed    Sugar was elevated.   Hemoglobin A1c is a measure of 3-month sugar control please still elevated and some uncontrolled at 7.9    Urine analysis showed sugar most likely related to medications but otherwise was negative with no evidence of microscopic protein or microscopic blood    Cholesterol levels were good and improved when compared to previous    Thyroid levels were normal    PSA for prostate was normal    Vitamin D level was normal    Blood counts were normal    Magnesium level was low and recommend magnesium oxide 400 mg daily    Other electrolytes, liver functions, and kidney function values were normal    Thanks

## 2023-03-02 NOTE — PROGRESS NOTES
[facility]) Physicians   Internal Medicine     3/2/2023  Hyun Snell : 1965 Sex: male  Age:57 y.o. Chief Complaint   Patient presents with    Hypertension     6 month f/u    Diabetes     6 month f/u    Cholesterol Problem     6 month f/u        HPI:   Patient presents to office for evaluation of the following medical concerns. chiro () - Acute cervical myofascial strain,  conservative care, massage therapy, manual therapy  urgent care () - neck strain   - States improved     [facility] during OR was concerned about elevated heart rate. No chest pain or discomfort. No syncope (did have one episode but sugar was low). No palpitations. No SOB. monitor (3/22) -this rhythm, super ventricular ectopy, ventricular premature beats rare    - States has chronic allergies. States taking allavert as needed. Off singulair, Has seen allergy in the past. still has nasal congestion and rhinorrhea. No ear pain. No facial pressure. No fever of chills. No chest pain, SOB. States cough. Has ProAir inhaler if needed, uses sparingly.    - States erectile issues are stable. States having difficulty maintaining erection. Stable. - States has Diabetes. Follows with endocrinology. Basaglar increased to 31 units and glipizide 5mg 4 tabs am and metformin tid. On jardiance 25mg daily . On trulicity. States some hypoglycemia. States follows with optho and podiatry exam. States sugar running 120-140. All < 200. States walking alot. last visit with endocrinology per reviewed note  () -diabetes on basaglar 40 units, glipizide, metformin, Farxiga, mounjaro(tirzepatide) weekly, increase mounjaro to 7.5mg weekly, A1c was 8.2, States basaglar is 44units, mounjaro when available. - States has high cholesterol. States was placed on Lipitor by endocrinology. States watches diet. - States high blood pressure. States does not check blood pressure at home. On lisinopril. No reported side effects.  improved today 3/2/2023    - States had urine retention post surgery. States was referred to urology. Last visit with urology per reviewed note (1/22) - urine retention, start flomax, check PSA (1/22) - 0.91    - [facility] walks 8-10 miles per day at work      Health Maintenance   - immunizations:   Influenza Vaccination - (11/12/2018), (2019), (2020),  (10/23/2022) - flu   Pneumonia Vaccination - declines   Shingles vaccine (shingrix) - declines  Tetanus Vaccination - (2009)   covid (2/25/2021) #1, (3/18/2021) # 2, (11/5/2021) #3 - pfizer     - Screenings:   Colonoscopy  - (12/2017) - normal - follow up 10yrs  Prostate     Couseled on Home Safety     ROS:  Review of Systems   Constitutional:  Negative for appetite change, chills, fever and unexpected weight change. HENT:  Negative for congestion, rhinorrhea and sore throat. Eyes:  Negative for pain and visual disturbance. Respiratory:  Negative for cough, chest tightness and shortness of breath. Cardiovascular:  Negative for chest pain and palpitations. Gastrointestinal:  Negative for abdominal pain, blood in stool, constipation, diarrhea, nausea and vomiting. Genitourinary:  Negative for difficulty urinating, dysuria, hematuria, scrotal swelling, testicular pain and urgency. Musculoskeletal:  Positive for arthralgias and back pain. Negative for gait problem. Skin:  Negative for rash. Neurological:  Negative for dizziness, syncope, weakness, light-headedness and headaches. Hematological:  Negative for adenopathy. Does not bruise/bleed easily. Psychiatric/Behavioral:  Negative for suicidal ideas. The patient is not nervous/anxious.         Current Outpatient Medications:     albuterol sulfate HFA (VENTOLIN HFA) 108 (90 Base) MCG/ACT inhaler, Inhale 2 puffs into the lungs 4 times daily as needed for Wheezing, Disp: 18 g, Rfl: 0    Tirzepatide (MOUNJARO) 5 MG/0.5ML SOPN SC injection, Mounjaro 5 mg/0.5 mL subcutaneous pen injector  INJECT 5 MG BY SUBCUTANEOUS ROUTE ONCE WEEKLY, Disp: , Rfl:     lisinopril (PRINIVIL;ZESTRIL) 20 MG tablet, Take 1 tablet by mouth 2 times daily, Disp: 180 tablet, Rfl: 3    metoprolol succinate (TOPROL XL) 25 MG extended release tablet, Take 1 tablet by mouth daily, Disp: 90 tablet, Rfl: 3    FARXIGA 10 MG tablet, TAKE 1 TABLET BY MOUTH EVERY DAY, Disp: , Rfl:     atorvastatin (LIPITOR) 40 MG tablet, Take 40 mg by mouth daily, Disp: , Rfl:     insulin glargine (LANTUS;BASAGLAR) 100 UNIT/ML injection pen, Inject 44 Units into the skin every morning, Disp: , Rfl:     metFORMIN (GLUCOPHAGE) 850 MG tablet, Take 850 mg by mouth 3 times daily , Disp: , Rfl:     glipiZIDE (GLUCOTROL) 5 MG tablet, Take 20 mg by mouth Taking 4 tabs q am, Disp: , Rfl:     tiZANidine (ZANAFLEX) 4 MG tablet, Take 1 tablet by mouth 4 times daily as needed (neck pain) (Patient not taking: Reported on 3/2/2023), Disp: 40 tablet, Rfl: 0    No Known Allergies    Past Medical History:   Diagnosis Date    Abdominal pain     Ankle arthralgia     Depression     Diarrhea     Diffuse cervicobrachial syndrome     Hyperlipidemia     Hypertension     Low back pain     Neck pain     Neck sprain     Psychosexual dysfunction associated with inhibited libido     Spasm     Type 2 diabetes mellitus without complication [facility])        Past Surgical History:   Procedure Laterality Date    COLONOSCOPY      2017 normal     SHOULDER ARTHROSCOPY Right     VASECTOMY  2017       No family history on file.     Social History     Socioeconomic History    Marital status:      Spouse name: Krysten    Number of children: 2    Years of education: 15    Highest education level: Some college, no degree   Occupational History    Occupation:    Tobacco Use    Smoking status: Former     Years: 20.00     Types: Cigarettes     Quit date: 2020     Years since quittin.5    Smokeless tobacco: Former     Types: Chew    Tobacco comments:     5-6 cigarettes qd, ocassional cigar   Vaping Use    Vaping Use: Never used   Substance and Sexual Activity    Alcohol use: Yes     Alcohol/week: 4.0 standard drinks     Types: 4 Cans of beer per week    Drug use: No    Sexual activity: Not on file   Other Topics Concern    Not on file   Social History Narrative    Not on file     Social Determinants of Health     Financial Resource Strain: Unknown    Difficulty of Paying Living Expenses: Patient refused   Food Insecurity: Unknown    Worried About Running Out of Food in the Last Year: Patient refused    [address] in the Last Year: Patient refused   Transportation Needs: Unknown    Lack of Transportation (Medical): Not on file    Lack of Transportation (Non-Medical): Patient refused   Physical Activity: Not on file   Stress: Not on file   Social Connections: Not on file   Intimate Partner Violence: Not on file   Housing Stability: Unknown    Unable to Pay for Housing in the Last Year: Not on file    Number of [place] in the Last Year: Not on file    Unstable Housing in the Last Year: Patient refused       Vitals:    03/02/23 1109   BP: 116/80   Site: Left Upper Arm   Position: Sitting   Cuff Size: Large Adult   Pulse: (!) 105   Resp: 18   Temp: 97.9 °F (36.6 °C)   TempSrc: Temporal   SpO2: 95%   Weight: 254 lb (115.2 kg)   Height: 5' 10\" (1.778 m)       Exam:  Physical Exam  Constitutional:       Appearance: He is well-developed. HENT:      Head: Normocephalic and atraumatic. Right Ear: External ear normal.      Left Ear: External ear normal.   Eyes:      Pupils: Pupils are equal, round, and reactive to light. Neck:      Thyroid: No thyromegaly. Cardiovascular:      Rate and Rhythm: Regular rhythm. Tachycardia present. Heart sounds: Normal heart sounds. No murmur heard. Pulmonary:      Effort: Pulmonary effort is normal.      Breath sounds: Normal breath sounds. No wheezing or rales. Abdominal:      General: Bowel sounds are normal.      Palpations: Abdomen is soft.       Tenderness: There is no abdominal tenderness. There is no guarding or rebound. Musculoskeletal:         General: Normal range of motion. Cervical back: Normal range of motion and neck supple. Lymphadenopathy:      Cervical: No cervical adenopathy. Skin:     General: Skin is warm and dry. Neurological:      Mental Status: He is alert and oriented to person, place, and time. Cranial Nerves: No cranial nerve deficit. Psychiatric:         Judgment: Judgment normal.       Assessment and Plan:    Diagnoses and all orders for this visit:    Chronic allergic rhinitis  - not improving   - has tried antihistamine   - recommend floase or nasacort  - off Singulair   - declines pulm for poss asthma   - recommend to restart antihistamine and steroid nasal spray     Type 2 diabetes mellitus with complication, with long-term current use of insulin ([address]  - following with endocrinology  - now on Basaglar 37-40 units  - on glucotrol  - on metformin,   - on farxiga   - on tirzepatide   - last A1c was 8.2 (12/2022)    On Statin   On ACE  follows with optho     Benign essential hypertension  - monitor blood pressure at home  - watch diet, low sodium   - on lisinopril   - on metoprolol   - Stable     Mixed hyperlipidemia  - watch diet   - on lipitor  - follow labs   - last lab (8/2022) borderline elevated     Generalized osteoarthritis  - following with ortho  - s/p synvisc to knee   - stable    Chronic pain of both knees  - following with ortho   - s/p injections     Tobacco use  - Counseled to stop smoking   - stopped 8/2020     Erectile dysfunction, unspecified erectile dysfunction type  - continue present treatment  - poss related to DM and HTN  - stable    Tachycardia  - EKG   - cardiac monitor completed (3/22) -this rhythm, super ventricular ectopy, ventricular premature beats rare  - has seen Cardio   - add metoprolol     Return in about 6 months (around 9/2/2023) for check up and review and labs.     No orders of the defined types were placed in this encounter.     Requested Prescriptions     Signed Prescriptions Disp Refills    albuterol sulfate HFA (VENTOLIN HFA) 108 (90 Base) MCG/ACT inhaler 18 g 0     Sig: Inhale 2 puffs into the lungs 4 times daily as needed for Wheezing       Farrah Mcclendon MD  3/2/2023  11:44 AM

## 2023-03-03 RX ORDER — MULTIVITAMIN
TABLET ORAL
COMMUNITY

## 2023-03-14 LAB
AVERAGE GLUCOSE: NORMAL
HBA1C MFR BLD: 7.9 %

## 2023-04-20 ENCOUNTER — OFFICE VISIT (OUTPATIENT)
Dept: CHIROPRACTIC MEDICINE | Age: 58
End: 2023-04-20

## 2023-04-20 VITALS
TEMPERATURE: 97.8 F | WEIGHT: 254 LBS | SYSTOLIC BLOOD PRESSURE: 120 MMHG | BODY MASS INDEX: 36.36 KG/M2 | DIASTOLIC BLOOD PRESSURE: 84 MMHG | HEART RATE: 105 BPM | OXYGEN SATURATION: 97 % | HEIGHT: 70 IN

## 2023-04-20 DIAGNOSIS — M54.04 PANNICULITIS AFFECTING REGIONS OF NECK AND BACK, THORACIC REGION: Primary | ICD-10-CM

## 2023-04-20 DIAGNOSIS — M62.830 MUSCLE SPASM OF BACK: ICD-10-CM

## 2023-04-20 NOTE — PROGRESS NOTES
Patient is here for a back adjustment.  Catarino Ayala MD  Electronically signed by Chavez Friedman LPN on 4/23/1913 at 9:82 AM
°F (36.6 °C)   SpO2: 97%     Peers well per no apparent distress. Alert and oriented x3. Cervical compression, distraction, foraminal compression tests are all negative. He does have tenderness of the costotransverse joint right at T5. There are active trigger points in the right trapezius and levator scapulae today. No midline pain in the cervical or thoracic region. There are hypertonic and tender fibers noted with palpation in the paraspinal muscles of the cervical, thoracic region. Joint fixation is noted with motion screening at T4-7. Rossy Isaacs was seen today for back pain. Diagnoses and all orders for this visit:    Panniculitis affecting regions of neck and back, thoracic region    Muscle spasm of back        Treatment Plan:  EMS with heat to the thoracic region for 15 minutes to address muscle spasm/hypertension and alleviate pain. Diversified manipulation to the above-listed segments in the thoracic spine. Noted immediate relief following care.   I will see him back as needed for further care      Seen By:  Carla Daniels

## 2023-05-02 ENCOUNTER — OFFICE VISIT (OUTPATIENT)
Dept: FAMILY MEDICINE CLINIC | Age: 58
End: 2023-05-02
Payer: COMMERCIAL

## 2023-05-02 VITALS
BODY MASS INDEX: 36.22 KG/M2 | WEIGHT: 253 LBS | SYSTOLIC BLOOD PRESSURE: 122 MMHG | DIASTOLIC BLOOD PRESSURE: 74 MMHG | RESPIRATION RATE: 16 BRPM | OXYGEN SATURATION: 98 % | HEART RATE: 106 BPM | HEIGHT: 70 IN | TEMPERATURE: 98.8 F

## 2023-05-02 DIAGNOSIS — M75.21 BICIPITAL TENDINITIS, RIGHT SHOULDER: Primary | ICD-10-CM

## 2023-05-02 PROCEDURE — 3074F SYST BP LT 130 MM HG: CPT | Performed by: FAMILY MEDICINE

## 2023-05-02 PROCEDURE — 3078F DIAST BP <80 MM HG: CPT | Performed by: FAMILY MEDICINE

## 2023-05-02 PROCEDURE — 99213 OFFICE O/P EST LOW 20 MIN: CPT | Performed by: FAMILY MEDICINE

## 2023-05-02 RX ORDER — MELOXICAM 15 MG/1
15 TABLET ORAL DAILY
Qty: 15 TABLET | Refills: 0 | Status: SHIPPED | OUTPATIENT
Start: 2023-05-02

## 2023-05-02 ASSESSMENT — ENCOUNTER SYMPTOMS
EYE PAIN: 0
NAUSEA: 0
ABDOMINAL PAIN: 0
CHEST TIGHTNESS: 0
ALLERGIC/IMMUNOLOGIC NEGATIVE: 1
EYE DISCHARGE: 0
SHORTNESS OF BREATH: 0
VOMITING: 0
BACK PAIN: 0
DIARRHEA: 0
PHOTOPHOBIA: 0
TROUBLE SWALLOWING: 0
SINUS PAIN: 0
EYE REDNESS: 0
COUGH: 0
BLOOD IN STOOL: 0
SORE THROAT: 0

## 2023-05-02 NOTE — PROGRESS NOTES
23  Nohemy Brown : 1965 Sex: male  Age: 65 y.o. Assessment and Plan:  Mak Olmos was seen today for shoulder pain. Diagnoses and all orders for this visit:    Bicipital tendinitis, right shoulder  -     XR SHOULDER RIGHT (MIN 2 VIEWS); Future  -     meloxicam (MOBIC) 15 MG tablet; Take 1 tablet by mouth daily    Heat to affected area, whichever feels better. Should recheck in 7 to 10 days with Dr. Dr Nahid Ervin or to orthopedics if not improving. Return 7 to 10-day recheck if not improving. .    Chief Complaint   Patient presents with    Shoulder Pain     R shoulder, cant really lift objects       Patient complains of right shoulder pain. Is over the biceps muscle and bicipital tendon. He denies any redness, ecchymosis, swelling of the joint. There is no radicular pain to the right hand. He was clearing rocks way over the weekend and noticed pain and stiffness after. Review of Systems   Constitutional:  Negative for appetite change, fatigue and unexpected weight change. HENT:  Negative for congestion, ear pain, hearing loss, sinus pain, sore throat and trouble swallowing. Eyes:  Negative for photophobia, pain, discharge and redness. Respiratory:  Negative for cough, chest tightness and shortness of breath. Cardiovascular:  Negative for chest pain, palpitations and leg swelling. Gastrointestinal:  Negative for abdominal pain, blood in stool, diarrhea, nausea and vomiting. Endocrine: Negative. Genitourinary:  Negative for dysuria, flank pain, frequency and hematuria. Musculoskeletal:  Positive for arthralgias and myalgias. Negative for back pain and joint swelling. Right shoulder   Skin: Negative. Allergic/Immunologic: Negative. Neurological:  Negative for dizziness, seizures, syncope, weakness, light-headedness, numbness and headaches. Hematological:  Negative for adenopathy. Does not bruise/bleed easily. Psychiatric/Behavioral: Negative.

## 2023-05-10 ENCOUNTER — OFFICE VISIT (OUTPATIENT)
Dept: CHIROPRACTIC MEDICINE | Age: 58
End: 2023-05-10
Payer: COMMERCIAL

## 2023-05-10 VITALS
HEART RATE: 110 BPM | OXYGEN SATURATION: 96 % | HEIGHT: 70 IN | WEIGHT: 253 LBS | BODY MASS INDEX: 36.22 KG/M2 | TEMPERATURE: 97.3 F

## 2023-05-10 DIAGNOSIS — M54.04 PANNICULITIS AFFECTING REGIONS OF NECK AND BACK, THORACIC REGION: ICD-10-CM

## 2023-05-10 DIAGNOSIS — M25.512 ACUTE PAIN OF BOTH SHOULDERS: ICD-10-CM

## 2023-05-10 DIAGNOSIS — M62.830 MUSCLE SPASM OF BACK: ICD-10-CM

## 2023-05-10 DIAGNOSIS — M25.511 ACUTE PAIN OF BOTH SHOULDERS: ICD-10-CM

## 2023-05-10 DIAGNOSIS — M54.2 CERVICALGIA: Primary | ICD-10-CM

## 2023-05-10 PROCEDURE — 98940 CHIROPRACT MANJ 1-2 REGIONS: CPT | Performed by: CHIROPRACTOR

## 2023-05-10 PROCEDURE — 99999 PR OFFICE/OUTPT VISIT,PROCEDURE ONLY: CPT | Performed by: CHIROPRACTOR

## 2023-05-10 PROCEDURE — 97014 ELECTRIC STIMULATION THERAPY: CPT | Performed by: CHIROPRACTOR

## 2023-05-10 NOTE — PROGRESS NOTES
Patient is here for neck and back pain.  Lance Wallace MD  Electronically signed by Corey Davalos LPN on 0/31/4504 at 8:72 PM

## 2023-05-10 NOTE — PROGRESS NOTES
5/10/23  Daun Craft : 1965 Sex: male  Age: 65 y.o. Chief Complaint   Patient presents with    Back Pain    Neck Pain       HPI:   He has been doing a lot of overhead work at his current job. As a result he has some neck and upper back tightness and stiffness as of while has bilateral shoulder pains worse left. His left shoulder reportedly has a rotator cuff tear that he never did anything about. His right shoulder was surgically repaired by Dr. Roman Esteves.         Current Outpatient Medications:     meloxicam (MOBIC) 15 MG tablet, Take 1 tablet by mouth daily, Disp: 15 tablet, Rfl: 0    VITAMIN D PO, Take by mouth daily, Disp: , Rfl:     Multiple Vitamin TABS, Take by mouth, Disp: , Rfl:     albuterol sulfate HFA (VENTOLIN HFA) 108 (90 Base) MCG/ACT inhaler, Inhale 2 puffs into the lungs 4 times daily as needed for Wheezing, Disp: 18 g, Rfl: 0    Tirzepatide (MOUNJARO) 5 MG/0.5ML SOPN SC injection, Mounjaro 5 mg/0.5 mL subcutaneous pen injector  INJECT 5 MG BY SUBCUTANEOUS ROUTE ONCE WEEKLY, Disp: , Rfl:     tiZANidine (ZANAFLEX) 4 MG tablet, Take 1 tablet by mouth 4 times daily as needed (neck pain), Disp: 40 tablet, Rfl: 0    lisinopril (PRINIVIL;ZESTRIL) 20 MG tablet, Take 1 tablet by mouth 2 times daily, Disp: 180 tablet, Rfl: 3    metoprolol succinate (TOPROL XL) 25 MG extended release tablet, Take 1 tablet by mouth daily, Disp: 90 tablet, Rfl: 3    FARXIGA 10 MG tablet, TAKE 1 TABLET BY MOUTH EVERY DAY, Disp: , Rfl:     atorvastatin (LIPITOR) 40 MG tablet, Take 1 tablet by mouth daily, Disp: , Rfl:     insulin glargine (LANTUS;BASAGLAR) 100 UNIT/ML injection pen, Inject 44 Units into the skin every morning, Disp: , Rfl:     metFORMIN (GLUCOPHAGE) 850 MG tablet, Take 1 tablet by mouth 3 times daily, Disp: , Rfl:     glipiZIDE (GLUCOTROL) 5 MG tablet, Take 4 tablets by mouth Taking 4 tabs q am, Disp: , Rfl:     Exam:   Vitals:    05/10/23 1432   Pulse: (!) 110   Temp: 97.3 °F (36.3 °C)   SpO2:

## 2023-05-18 ENCOUNTER — OFFICE VISIT (OUTPATIENT)
Dept: ORTHOPEDIC SURGERY | Age: 58
End: 2023-05-18

## 2023-05-18 VITALS — WEIGHT: 245 LBS | BODY MASS INDEX: 35.07 KG/M2 | HEIGHT: 70 IN

## 2023-05-18 DIAGNOSIS — M19.011 LOCALIZED OSTEOARTHRITIS OF RIGHT SHOULDER: Primary | ICD-10-CM

## 2023-05-18 RX ORDER — TRIAMCINOLONE ACETONIDE 40 MG/ML
40 INJECTION, SUSPENSION INTRA-ARTICULAR; INTRAMUSCULAR ONCE
Status: COMPLETED | OUTPATIENT
Start: 2023-05-18 | End: 2023-05-18

## 2023-05-18 RX ORDER — LIDOCAINE HYDROCHLORIDE 10 MG/ML
4 INJECTION, SOLUTION INFILTRATION; PERINEURAL ONCE
Status: COMPLETED | OUTPATIENT
Start: 2023-05-18 | End: 2023-05-18

## 2023-05-18 RX ADMIN — LIDOCAINE HYDROCHLORIDE 4 ML: 10 INJECTION, SOLUTION INFILTRATION; PERINEURAL at 09:02

## 2023-05-18 RX ADMIN — TRIAMCINOLONE ACETONIDE 40 MG: 40 INJECTION, SUSPENSION INTRA-ARTICULAR; INTRAMUSCULAR at 09:02

## 2023-05-18 NOTE — PROGRESS NOTES
days, patient should return to the clinic for further evaluation. Otherwise, I will see the patient back on a PRN basis. Electronically signed by Andrews Scruggs PA-C on 5/18/23 at 8:46 AM EDT    **This report was transcribed using voice recognition software. Every effort was made to ensure accuracy; however, inadvertent computerized transcription errors may be present. **

## 2023-05-19 DIAGNOSIS — M19.011 LOCALIZED OSTEOARTHRITIS OF RIGHT SHOULDER: Primary | ICD-10-CM

## 2023-05-19 RX ORDER — METHYLPREDNISOLONE 4 MG/1
TABLET ORAL
Qty: 1 KIT | Refills: 0 | Status: SHIPPED | OUTPATIENT
Start: 2023-05-19

## 2023-06-08 ENCOUNTER — OFFICE VISIT (OUTPATIENT)
Dept: CHIROPRACTIC MEDICINE | Age: 58
End: 2023-06-08

## 2023-06-08 VITALS
OXYGEN SATURATION: 96 % | HEIGHT: 70 IN | BODY MASS INDEX: 35.07 KG/M2 | TEMPERATURE: 97.7 F | HEART RATE: 114 BPM | WEIGHT: 245 LBS

## 2023-06-08 DIAGNOSIS — M62.830 MUSCLE SPASM OF BACK: ICD-10-CM

## 2023-06-08 DIAGNOSIS — M54.04 PANNICULITIS AFFECTING REGIONS OF NECK AND BACK, THORACIC REGION: Primary | ICD-10-CM

## 2023-06-08 NOTE — PROGRESS NOTES
Patient is here for back pain. Patient states mid right side. No injury.   Anabel Emery MD  Electronically signed by Luis Carlos Porras LPN on 2/7/3737 at 8:50 PM
Vitals:    06/08/23 1354   Pulse: (!) 114   Temp: 97.7 °F (36.5 °C)   SpO2: 96%     He appears well. No apparent distress. Alert oriented x3. He is nontender in the midline. He does have tenderness of the costotransverse joints on the right at T8 and 9. There are hypertonic and tender fibers noted with palpation in the paraspinal muscles of the thoracic region. Joint fixation is noted with motion screening at T4-7, T9-10. Marly Vega was seen today for back pain. Diagnoses and all orders for this visit:    Panniculitis affecting regions of neck and back, thoracic region    Muscle spasm of back        Treatment Plan:    EMS with heat to the lower thoracic region for 15 minutes to address muscle spasm/hypertension and alleviate pain. Diversified manipulation to the above-listed segments in the thoracic spine. Tolerated well. We can see how he does, follow-up with me as needed for care.       Seen By:  Sherri Cline DC

## 2023-06-15 LAB
AVERAGE GLUCOSE: NORMAL
HBA1C MFR BLD: 7.3 %

## 2023-06-19 ENCOUNTER — OFFICE VISIT (OUTPATIENT)
Dept: CHIROPRACTIC MEDICINE | Age: 58
End: 2023-06-19
Payer: COMMERCIAL

## 2023-06-19 VITALS
HEIGHT: 70 IN | BODY MASS INDEX: 35.07 KG/M2 | HEART RATE: 105 BPM | TEMPERATURE: 97.9 F | WEIGHT: 245 LBS | OXYGEN SATURATION: 95 %

## 2023-06-19 DIAGNOSIS — M62.830 MUSCLE SPASM OF BACK: ICD-10-CM

## 2023-06-19 DIAGNOSIS — M54.04 PANNICULITIS AFFECTING REGIONS OF NECK AND BACK, THORACIC REGION: Primary | ICD-10-CM

## 2023-06-19 PROCEDURE — 97014 ELECTRIC STIMULATION THERAPY: CPT | Performed by: CHIROPRACTOR

## 2023-06-19 PROCEDURE — 99999 PR OFFICE/OUTPT VISIT,PROCEDURE ONLY: CPT | Performed by: CHIROPRACTOR

## 2023-06-19 PROCEDURE — 98940 CHIROPRACT MANJ 1-2 REGIONS: CPT | Performed by: CHIROPRACTOR

## 2023-06-19 NOTE — PROGRESS NOTES
Patient is here for follow up mid back pain. Patient states no new concerns.  Catarino Ayala MD  Electronically signed by Chavez Friedman LPN on 6/41/0935 at 10:14 AM

## 2023-06-19 NOTE — PROGRESS NOTES
23  Dora Abraham : 1965 Sex: male  Age: 65 y.o. Chief Complaint   Patient presents with    Back Pain       HPI:   No new issues. No new injuries. Still experiencing mid back pain, between the shoulder but blades and slightly below. Trying to get scheduled with orthopedics for his shoulder - [facility] at [facility]    Current Outpatient Medications:     methylPREDNISolone (MEDROL, ALVIN,) 4 MG tablet, Take by mouth., Disp: 1 kit, Rfl: 0    meloxicam (MOBIC) 15 MG tablet, Take 1 tablet by mouth daily, Disp: 15 tablet, Rfl: 0    VITAMIN D PO, Take by mouth daily, Disp: , Rfl:     Multiple Vitamin TABS, Take by mouth, Disp: , Rfl:     albuterol sulfate HFA (VENTOLIN HFA) 108 (90 Base) MCG/ACT inhaler, Inhale 2 puffs into the lungs 4 times daily as needed for Wheezing, Disp: 18 g, Rfl: 0    Tirzepatide (MOUNJARO) 5 MG/0.5ML SOPN SC injection, Mounjaro 5 mg/0.5 mL subcutaneous pen injector  INJECT 5 MG BY SUBCUTANEOUS ROUTE ONCE WEEKLY, Disp: , Rfl:     tiZANidine (ZANAFLEX) 4 MG tablet, Take 1 tablet by mouth 4 times daily as needed (neck pain), Disp: 40 tablet, Rfl: 0    lisinopril (PRINIVIL;ZESTRIL) 20 MG tablet, Take 1 tablet by mouth 2 times daily, Disp: 180 tablet, Rfl: 3    metoprolol succinate (TOPROL XL) 25 MG extended release tablet, Take 1 tablet by mouth daily, Disp: 90 tablet, Rfl: 3    FARXIGA 10 MG tablet, TAKE 1 TABLET BY MOUTH EVERY DAY, Disp: , Rfl:     atorvastatin (LIPITOR) 40 MG tablet, Take 1 tablet by mouth daily, Disp: , Rfl:     insulin glargine (LANTUS;BASAGLAR) 100 UNIT/ML injection pen, Inject 44 Units into the skin every morning, Disp: , Rfl:     metFORMIN (GLUCOPHAGE) 850 MG tablet, Take 1 tablet by mouth 3 times daily, Disp: , Rfl:     glipiZIDE (GLUCOTROL) 5 MG tablet, Take 4 tablets by mouth Taking 4 tabs q am, Disp: , Rfl:     Exam:   Vitals:    23 1052   Pulse: (!) 105   Temp: 97.9 °F (36.6 °C)   SpO2: 95%     No midline pain.   But there is continued tenderness

## 2023-07-03 ENCOUNTER — OFFICE VISIT (OUTPATIENT)
Dept: CHIROPRACTIC MEDICINE | Age: 58
End: 2023-07-03
Payer: COMMERCIAL

## 2023-07-03 VITALS
BODY MASS INDEX: 35.07 KG/M2 | OXYGEN SATURATION: 95 % | TEMPERATURE: 97.9 F | WEIGHT: 245 LBS | HEIGHT: 70 IN | HEART RATE: 107 BPM

## 2023-07-03 DIAGNOSIS — M54.04 PANNICULITIS AFFECTING REGIONS OF NECK AND BACK, THORACIC REGION: Primary | ICD-10-CM

## 2023-07-03 DIAGNOSIS — M62.830 MUSCLE SPASM OF BACK: ICD-10-CM

## 2023-07-03 PROCEDURE — 97014 ELECTRIC STIMULATION THERAPY: CPT | Performed by: CHIROPRACTOR

## 2023-07-03 PROCEDURE — 98940 CHIROPRACT MANJ 1-2 REGIONS: CPT | Performed by: CHIROPRACTOR

## 2023-07-03 PROCEDURE — 99999 PR OFFICE/OUTPT VISIT,PROCEDURE ONLY: CPT | Performed by: CHIROPRACTOR

## 2023-07-03 NOTE — PROGRESS NOTES
Patient is here for back pain.  Sydna Fabry, MD  Electronically signed by Bruce Ojeda LPN on 6/3/5016 at 3:95 PM
95%     Pain is reproducible with palpation over the costotransverse joints right at 7-9. There are hypertonic and tender fibers noted with palpation in the paraspinal muscles of the thoracic region. Joint fixation is noted with motion screening at T6-8, T12-L1    Daren was seen today for back pain. Diagnoses and all orders for this visit:    Panniculitis affecting regions of neck and back, thoracic region    Muscle spasm of back        Treatment Plan:  EMS with heat to the thoracic region for 15 minutes to address muscle spasm/hypertension and alleviate pain. Diversified manipulation to the above-listed segments in the thoracic spine and lumbar spine. Tolerated well. I want him working on range of motion-he can use his golf club. Thoracic rotations x10 and left-sided golf swing.     See him as needed    Seen By:  Elena Rodriguez

## 2023-07-10 RX ORDER — ALBUTEROL SULFATE 90 UG/1
2 AEROSOL, METERED RESPIRATORY (INHALATION) 4 TIMES DAILY PRN
Qty: 18 G | Refills: 0 | Status: SHIPPED | OUTPATIENT
Start: 2023-07-10

## 2023-07-10 NOTE — TELEPHONE ENCOUNTER
Patient requesting refill. Uses [facility] in [facility].       Last Appointment:  3/2/2023  Future Appointments   Date Time Provider [address]   9/1/2023  7:30 AM [facility]

## 2023-09-01 ENCOUNTER — OFFICE VISIT (OUTPATIENT)
Dept: PRIMARY CARE CLINIC | Age: 58
End: 2023-09-01
Payer: COMMERCIAL

## 2023-09-01 ENCOUNTER — TELEPHONE (OUTPATIENT)
Dept: PRIMARY CARE CLINIC | Age: 58
End: 2023-09-01

## 2023-09-01 VITALS
TEMPERATURE: 97.7 F | SYSTOLIC BLOOD PRESSURE: 112 MMHG | HEIGHT: 70 IN | DIASTOLIC BLOOD PRESSURE: 82 MMHG | OXYGEN SATURATION: 95 % | BODY MASS INDEX: 35.68 KG/M2 | HEART RATE: 100 BPM | WEIGHT: 249.25 LBS

## 2023-09-01 DIAGNOSIS — M15.9 GENERALIZED OSTEOARTHRITIS: ICD-10-CM

## 2023-09-01 DIAGNOSIS — R00.0 TACHYCARDIA: ICD-10-CM

## 2023-09-01 DIAGNOSIS — E55.9 VITAMIN D DEFICIENCY: ICD-10-CM

## 2023-09-01 DIAGNOSIS — Z79.899 LONG TERM CURRENT USE OF THERAPEUTIC DRUG: ICD-10-CM

## 2023-09-01 DIAGNOSIS — M25.562 CHRONIC PAIN OF BOTH KNEES: ICD-10-CM

## 2023-09-01 DIAGNOSIS — E11.9 TYPE 2 DIABETES MELLITUS WITHOUT COMPLICATION, WITH LONG-TERM CURRENT USE OF INSULIN (HCC): ICD-10-CM

## 2023-09-01 DIAGNOSIS — R23.8 OTHER SKIN CHANGES: Primary | ICD-10-CM

## 2023-09-01 DIAGNOSIS — I10 BENIGN ESSENTIAL HYPERTENSION: ICD-10-CM

## 2023-09-01 DIAGNOSIS — M25.561 CHRONIC PAIN OF BOTH KNEES: ICD-10-CM

## 2023-09-01 DIAGNOSIS — Z72.0 TOBACCO USE: ICD-10-CM

## 2023-09-01 DIAGNOSIS — Z79.4 TYPE 2 DIABETES MELLITUS WITHOUT COMPLICATION, WITH LONG-TERM CURRENT USE OF INSULIN (HCC): ICD-10-CM

## 2023-09-01 DIAGNOSIS — J30.9 CHRONIC ALLERGIC RHINITIS: ICD-10-CM

## 2023-09-01 DIAGNOSIS — Z12.5 SCREENING FOR MALIGNANT NEOPLASM OF PROSTATE: ICD-10-CM

## 2023-09-01 DIAGNOSIS — R53.83 OTHER FATIGUE: ICD-10-CM

## 2023-09-01 DIAGNOSIS — E78.2 MIXED HYPERLIPIDEMIA: ICD-10-CM

## 2023-09-01 DIAGNOSIS — G89.29 CHRONIC PAIN OF BOTH KNEES: ICD-10-CM

## 2023-09-01 PROCEDURE — 3051F HG A1C>EQUAL 7.0%<8.0%: CPT | Performed by: INTERNAL MEDICINE

## 2023-09-01 PROCEDURE — 99214 OFFICE O/P EST MOD 30 MIN: CPT | Performed by: INTERNAL MEDICINE

## 2023-09-01 PROCEDURE — 3074F SYST BP LT 130 MM HG: CPT | Performed by: INTERNAL MEDICINE

## 2023-09-01 PROCEDURE — 3079F DIAST BP 80-89 MM HG: CPT | Performed by: INTERNAL MEDICINE

## 2023-09-01 RX ORDER — METOPROLOL SUCCINATE 25 MG/1
25 TABLET, EXTENDED RELEASE ORAL DAILY
Qty: 90 TABLET | Refills: 3 | Status: SHIPPED | OUTPATIENT
Start: 2023-09-01

## 2023-09-01 RX ORDER — SEMAGLUTIDE 0.68 MG/ML
INJECTION, SOLUTION SUBCUTANEOUS
COMMUNITY
Start: 2023-08-23

## 2023-09-01 RX ORDER — TRIAMCINOLONE ACETONIDE 1 MG/G
CREAM TOPICAL
Qty: 80 G | Refills: 1 | Status: SHIPPED | OUTPATIENT
Start: 2023-09-01

## 2023-09-01 RX ORDER — OXYCODONE HYDROCHLORIDE AND ACETAMINOPHEN 5; 325 MG/1; MG/1
TABLET ORAL
COMMUNITY
Start: 2023-08-04

## 2023-09-01 RX ORDER — LISINOPRIL 20 MG/1
20 TABLET ORAL 2 TIMES DAILY
Qty: 180 TABLET | Refills: 3 | Status: SHIPPED | OUTPATIENT
Start: 2023-09-01

## 2023-09-01 ASSESSMENT — ENCOUNTER SYMPTOMS
VOMITING: 0
BACK PAIN: 1
CONSTIPATION: 0
RHINORRHEA: 0
EYE PAIN: 0
SHORTNESS OF BREATH: 0
COUGH: 0
NAUSEA: 0
BLOOD IN STOOL: 0
ABDOMINAL PAIN: 0
CHEST TIGHTNESS: 0
SORE THROAT: 0
DIARRHEA: 0

## 2023-09-01 NOTE — TELEPHONE ENCOUNTER
Per Dr. Eulas Gaucher  message left for Dr. Isabel Barrera nurse requesting surgery report, patient had right shoulder surgery 07/26/2023 at [facility].

## 2023-09-06 NOTE — TELEPHONE ENCOUNTER
Notified Cornelio Babinski at [email or web address], [phone], she states she will fax op report to [phone].

## 2023-09-11 DIAGNOSIS — E11.9 TYPE 2 DIABETES MELLITUS WITHOUT COMPLICATION, WITH LONG-TERM CURRENT USE OF INSULIN (HCC): ICD-10-CM

## 2023-09-11 DIAGNOSIS — E78.2 MIXED HYPERLIPIDEMIA: ICD-10-CM

## 2023-09-11 DIAGNOSIS — Z79.899 LONG TERM CURRENT USE OF THERAPEUTIC DRUG: ICD-10-CM

## 2023-09-11 DIAGNOSIS — R53.83 OTHER FATIGUE: ICD-10-CM

## 2023-09-11 DIAGNOSIS — E55.9 VITAMIN D DEFICIENCY: ICD-10-CM

## 2023-09-11 DIAGNOSIS — Z79.4 TYPE 2 DIABETES MELLITUS WITHOUT COMPLICATION, WITH LONG-TERM CURRENT USE OF INSULIN (HCC): ICD-10-CM

## 2023-09-12 LAB
ABSOLUTE IMMATURE GRANULOCYTE: 0.05 K/UL (ref 0–0.58)
ALBUMIN SERPL-MCNC: 4.4 G/DL (ref 3.5–5.2)
ALP BLD-CCNC: 90 U/L (ref 40–129)
ALT SERPL-CCNC: 39 U/L (ref 0–40)
ANION GAP SERPL CALCULATED.3IONS-SCNC: 15 MMOL/L (ref 7–16)
AST SERPL-CCNC: 28 U/L (ref 0–39)
BACTERIA: ABNORMAL
BASOPHILS ABSOLUTE: 0.08 K/UL (ref 0–0.2)
BASOPHILS RELATIVE PERCENT: 1 % (ref 0–2)
BILIRUB SERPL-MCNC: 0.5 MG/DL (ref 0–1.2)
BILIRUBIN URINE: NEGATIVE
BUN BLDV-MCNC: 24 MG/DL (ref 6–20)
CALCIUM SERPL-MCNC: 9.6 MG/DL (ref 8.6–10.2)
CHLORIDE BLD-SCNC: 102 MMOL/L (ref 98–107)
CHOLESTEROL, FASTING: 141 MG/DL
CO2: 22 MMOL/L (ref 22–29)
COLOR: YELLOW
CREAT SERPL-MCNC: 1 MG/DL (ref 0.7–1.2)
CREATININE URINE: 165.6 MG/DL (ref 40–278)
EOSINOPHILS ABSOLUTE: 0.23 K/UL (ref 0.05–0.5)
EOSINOPHILS RELATIVE PERCENT: 3 % (ref 0–6)
FOLATE: 13.8 NG/ML (ref 4.8–24.2)
GFR SERPL CREATININE-BSD FRML MDRD: >60 ML/MIN/1.73M2
GLUCOSE BLD-MCNC: 173 MG/DL (ref 74–99)
GLUCOSE URINE: >=1000 MG/DL
HBA1C MFR BLD: 7.3 % (ref 4–5.6)
HCT VFR BLD CALC: 46.6 % (ref 37–54)
HDLC SERPL-MCNC: 31 MG/DL
HEMOGLOBIN: 15.5 G/DL (ref 12.5–16.5)
IMMATURE GRANULOCYTES: 1 % (ref 0–5)
KETONES, URINE: NEGATIVE MG/DL
LDL CHOLESTEROL: 73 MG/DL
LEUKOCYTE ESTERASE, URINE: NEGATIVE
LYMPHOCYTES ABSOLUTE: 1.87 K/UL (ref 1.5–4)
LYMPHOCYTES RELATIVE PERCENT: 20 % (ref 20–42)
MAGNESIUM: 1.4 MG/DL (ref 1.6–2.6)
MCH RBC QN AUTO: 30.8 PG (ref 26–35)
MCHC RBC AUTO-ENTMCNC: 33.3 G/DL (ref 32–34.5)
MCV RBC AUTO: 92.5 FL (ref 80–99.9)
MICROALBUMIN/CREAT 24H UR: 123 MG/L (ref 0–19)
MICROALBUMIN/CREAT UR-RTO: 74 MCG/MG CREAT (ref 0–30)
MONOCYTES ABSOLUTE: 0.67 K/UL (ref 0.1–0.95)
MONOCYTES RELATIVE PERCENT: 7 % (ref 2–12)
NEUTROPHILS ABSOLUTE: 6.32 K/UL (ref 1.8–7.3)
NEUTROPHILS RELATIVE PERCENT: 69 % (ref 43–80)
NITRITE, URINE: NEGATIVE
PDW BLD-RTO: 13 % (ref 11.5–15)
PH UA: 6 (ref 5–9)
PLATELET # BLD: 281 K/UL (ref 130–450)
PMV BLD AUTO: 11.1 FL (ref 7–12)
POTASSIUM SERPL-SCNC: 4.7 MMOL/L (ref 3.5–5)
PROTEIN UA: ABNORMAL MG/DL
RBC # BLD: 5.04 M/UL (ref 3.8–5.8)
RBC UA: ABNORMAL /HPF
SODIUM BLD-SCNC: 139 MMOL/L (ref 132–146)
SPECIFIC GRAVITY UA: >1.03 (ref 1–1.03)
TOTAL PROTEIN: 7.4 G/DL (ref 6.4–8.3)
TRIGLYCERIDE, FASTING: 187 MG/DL
TSH SERPL DL<=0.05 MIU/L-ACNC: 1.16 UIU/ML (ref 0.27–4.2)
TURBIDITY: CLEAR
URINE HGB: NEGATIVE
UROBILINOGEN, URINE: 0.2 EU/DL (ref 0–1)
VITAMIN B-12: 775 PG/ML (ref 211–946)
VITAMIN D 25-HYDROXY: 49.6 NG/ML (ref 30–100)
VLDLC SERPL CALC-MCNC: 37 MG/DL
WBC # BLD: 9.2 K/UL (ref 4.5–11.5)
WBC UA: ABNORMAL /HPF

## 2023-09-15 ENCOUNTER — TELEPHONE (OUTPATIENT)
Dept: FAMILY MEDICINE CLINIC | Age: 58
End: 2023-09-15

## 2023-09-15 NOTE — TELEPHONE ENCOUNTER
Please let the patient know that blood work results showed    Sugar was elevated. Hemoglobin A1c is a measure of 3-month sugar control was stable at 7.3    Urine analysis showed an increase in microscopic protein as well as trace overt protein in the urine. Urine analysis also showed sugar which is most likely related to medication. Also showed slight bacteria    Magnesium level was low and would recommend magnesium oxide 400 mg twice a day    Cholesterol levels were fair. Triglyceride levels however were borderline elevated    BUN level was borderline elevated, this is a measure of kidney function and could be associate with dehydration.   Other markers of kidney function were normal    Vitamin L52 and folic acid levels were normal    Thyroid levels were normal    Vitamin D levels were normal    Blood counts were normal    Other electrolytes and liver functions were normal    Thanks

## 2023-10-18 ENCOUNTER — OFFICE VISIT (OUTPATIENT)
Dept: FAMILY MEDICINE CLINIC | Age: 58
End: 2023-10-18
Payer: COMMERCIAL

## 2023-10-18 VITALS
HEART RATE: 111 BPM | BODY MASS INDEX: 35.58 KG/M2 | WEIGHT: 248.5 LBS | RESPIRATION RATE: 18 BRPM | HEIGHT: 70 IN | OXYGEN SATURATION: 96 % | TEMPERATURE: 98 F | DIASTOLIC BLOOD PRESSURE: 82 MMHG | SYSTOLIC BLOOD PRESSURE: 122 MMHG

## 2023-10-18 DIAGNOSIS — R05.1 ACUTE COUGH: ICD-10-CM

## 2023-10-18 DIAGNOSIS — J32.9 SINOBRONCHITIS: Primary | ICD-10-CM

## 2023-10-18 DIAGNOSIS — J40 SINOBRONCHITIS: Primary | ICD-10-CM

## 2023-10-18 LAB
Lab: NORMAL
PERFORMING INSTRUMENT: NORMAL
QC PASS/FAIL: NORMAL
SARS-COV-2, POC: NORMAL

## 2023-10-18 PROCEDURE — 99213 OFFICE O/P EST LOW 20 MIN: CPT | Performed by: NURSE PRACTITIONER

## 2023-10-18 PROCEDURE — 3074F SYST BP LT 130 MM HG: CPT | Performed by: NURSE PRACTITIONER

## 2023-10-18 PROCEDURE — 3079F DIAST BP 80-89 MM HG: CPT | Performed by: NURSE PRACTITIONER

## 2023-10-18 PROCEDURE — 96372 THER/PROPH/DIAG INJ SC/IM: CPT | Performed by: NURSE PRACTITIONER

## 2023-10-18 PROCEDURE — 87426 SARSCOV CORONAVIRUS AG IA: CPT | Performed by: NURSE PRACTITIONER

## 2023-10-18 RX ORDER — METHYLPREDNISOLONE 4 MG/1
TABLET ORAL
Qty: 1 KIT | Refills: 0 | Status: SHIPPED | OUTPATIENT
Start: 2023-10-18

## 2023-10-18 RX ORDER — BENZONATATE 100 MG/1
100 CAPSULE ORAL 3 TIMES DAILY PRN
Qty: 30 CAPSULE | Refills: 0 | Status: SHIPPED | OUTPATIENT
Start: 2023-10-18 | End: 2023-10-28

## 2023-10-18 RX ORDER — AZITHROMYCIN 250 MG/1
250 TABLET, FILM COATED ORAL SEE ADMIN INSTRUCTIONS
Qty: 6 TABLET | Refills: 0 | Status: SHIPPED | OUTPATIENT
Start: 2023-10-18 | End: 2023-10-23

## 2023-10-18 RX ORDER — METHYLPREDNISOLONE ACETATE 40 MG/ML
40 INJECTION, SUSPENSION INTRA-ARTICULAR; INTRALESIONAL; INTRAMUSCULAR; SOFT TISSUE ONCE
Status: COMPLETED | OUTPATIENT
Start: 2023-10-18 | End: 2023-10-18

## 2023-10-18 RX ADMIN — METHYLPREDNISOLONE ACETATE 40 MG: 40 INJECTION, SUSPENSION INTRA-ARTICULAR; INTRALESIONAL; INTRAMUSCULAR; SOFT TISSUE at 08:44

## 2023-10-18 NOTE — PROGRESS NOTES
normal.         Cognition and Memory: Cognition and memory normal.         Judgment: Judgment normal.       Lab / Imaging Results   (All laboratory and radiology results have been personally reviewed by myself)  Labs:  Results for orders placed or performed in visit on 10/18/23   POCT COVID-19, Antigen   Result Value Ref Range    SARS-COV-2, POC Not-Detected Not Detected    Lot Number [id number]     QC Pass/Fail pass     Performing Instrument BD Veritor        Imaging: All Radiology results interpreted by Radiologist unless otherwise noted. No results found. Assessment/Plan  Priya Banda was seen today for congestion and cough. Diagnoses and all orders for this visit:    Sinobronchitis  -     methylPREDNISolone acetate (DEPO-MEDROL) injection 40 mg  -     methylPREDNISolone (MEDROL DOSEPACK) 4 MG tablet; Take by mouth.  -     azithromycin (ZITHROMAX) 250 MG tablet; Take 1 tablet by mouth See Admin Instructions for 5 days 500mg on day 1 followed by 250mg on days 2 - 5  -     benzonatate (TESSALON) 100 MG capsule; Take 1 capsule by mouth 3 times daily as needed for Cough    Acute cough  -     POCT COVID-19, Antigen      Rapid Covid testing in office is negative. Depo-Medrol injection given today in office. Prescription for anxiety, Z-Juan Antonio and Medrol Dosepak, side effects and administration instructions discussed. Increase fluids and rest.   Other symptomatic relief discussed including Tylenol prn pain/fever. Schedule f/u with PCP in 7-10 days if symptoms persist.  Go to ED sooner if symptoms worsen or change. ED immediately with high or refractory fever, progressive SOB, dyspnea, CP, calf pain/swelling, shaking chills, vomiting, abdominal pain, lethargy, flank pain, or decreased urinary output. Pt verbalizes understanding and is in agreement with plan of care. All questions answered. LYUDMILA Gu NP    *NOTE: This report was transcribed using voice recognition software.  Every effort was made to ensure

## 2024-02-05 ENCOUNTER — OFFICE VISIT (OUTPATIENT)
Dept: CHIROPRACTIC MEDICINE | Age: 59
End: 2024-02-05
Payer: COMMERCIAL

## 2024-02-05 VITALS
BODY MASS INDEX: 35.79 KG/M2 | HEIGHT: 70 IN | OXYGEN SATURATION: 97 % | TEMPERATURE: 97.7 F | HEART RATE: 124 BPM | WEIGHT: 250 LBS

## 2024-02-05 DIAGNOSIS — M54.2 CERVICALGIA: Primary | ICD-10-CM

## 2024-02-05 DIAGNOSIS — M62.830 MUSCLE SPASM OF BACK: ICD-10-CM

## 2024-02-05 DIAGNOSIS — M54.04 PANNICULITIS AFFECTING REGIONS OF NECK AND BACK, THORACIC REGION: ICD-10-CM

## 2024-02-05 DIAGNOSIS — M54.50 ACUTE RIGHT-SIDED LOW BACK PAIN WITHOUT SCIATICA: ICD-10-CM

## 2024-02-05 PROCEDURE — 97014 ELECTRIC STIMULATION THERAPY: CPT | Performed by: CHIROPRACTOR

## 2024-02-05 PROCEDURE — 98941 CHIROPRACT MANJ 3-4 REGIONS: CPT | Performed by: CHIROPRACTOR

## 2024-02-05 PROCEDURE — 99999 PR OFFICE/OUTPT VISIT,PROCEDURE ONLY: CPT | Performed by: CHIROPRACTOR

## 2024-02-05 NOTE — PROGRESS NOTES
Patient is here for back pain. Patient states he was on vacation when pain states, no injury. Onset of pain 1/29/24. Zhen Rosas MD  Electronically signed by Sofia Meier LPN on 2/5/2024 at 8:29 AM    
insulin glargine (LANTUS;BASAGLAR) 100 UNIT/ML injection pen, Inject 44 Units into the skin every morning, Disp: , Rfl:     metFORMIN (GLUCOPHAGE) 850 MG tablet, Take 1 tablet by mouth 3 times daily, Disp: , Rfl:     glipiZIDE (GLUCOTROL) 5 MG tablet, Take 4 tablets by mouth Taking 4 tabs q am, Disp: , Rfl:     Exam:   Vitals:    02/05/24 0827   Pulse: (!) 124   Temp: 97.7 °F (36.5 °C)   SpO2: 97%     He appears well.  No apparent distress.  Alert and oriented x 3.  Ambulating without difficulty.  No midline pain today throughout spinal regions.  Active trigger point right trapezius today.  Trigger points bilateral thoracolumbar paraspinal muscles.  There are hypertonic and tender fibers noted with palpation in the paraspinal muscles of the cervical, thoracic, lumbar region. Joint fixation is noted with motion screening at T12-L1, L5-S1, T3-6, C5-6.    Daren was seen today for back pain.    Diagnoses and all orders for this visit:    Cervicalgia    Panniculitis affecting regions of neck and back, thoracic region    Muscle spasm of back    Acute right-sided low back pain without sciatica        Treatment Plan:  EMS with heat to the thoracolumbar region for 15 minutes to address muscle spasm/hypertension and alleviate pain.  Diversified manipulation to the above-listed segments in the cervical spine, thoracic spine, and lumbar spine.  Tolerated well.  He noted immediate relief following care.  Plans to go golfing today if the weather cooperates.  I will see him back as needed for care        Seen By:  Jarrod Turk DC

## 2024-03-01 ENCOUNTER — OFFICE VISIT (OUTPATIENT)
Dept: PRIMARY CARE CLINIC | Age: 59
End: 2024-03-01
Payer: COMMERCIAL

## 2024-03-01 VITALS
HEART RATE: 102 BPM | SYSTOLIC BLOOD PRESSURE: 122 MMHG | OXYGEN SATURATION: 96 % | TEMPERATURE: 97.9 F | BODY MASS INDEX: 36.51 KG/M2 | HEIGHT: 70 IN | DIASTOLIC BLOOD PRESSURE: 78 MMHG | WEIGHT: 255 LBS

## 2024-03-01 DIAGNOSIS — I10 BENIGN ESSENTIAL HYPERTENSION: ICD-10-CM

## 2024-03-01 DIAGNOSIS — R00.0 TACHYCARDIA: ICD-10-CM

## 2024-03-01 DIAGNOSIS — Z79.4 TYPE 2 DIABETES MELLITUS WITHOUT COMPLICATION, WITH LONG-TERM CURRENT USE OF INSULIN (HCC): ICD-10-CM

## 2024-03-01 DIAGNOSIS — Z79.899 LONG TERM CURRENT USE OF THERAPEUTIC DRUG: ICD-10-CM

## 2024-03-01 DIAGNOSIS — R53.83 OTHER FATIGUE: ICD-10-CM

## 2024-03-01 DIAGNOSIS — E55.9 VITAMIN D DEFICIENCY: ICD-10-CM

## 2024-03-01 DIAGNOSIS — Z72.0 TOBACCO USE: ICD-10-CM

## 2024-03-01 DIAGNOSIS — E11.9 TYPE 2 DIABETES MELLITUS WITHOUT COMPLICATION, WITH LONG-TERM CURRENT USE OF INSULIN (HCC): ICD-10-CM

## 2024-03-01 DIAGNOSIS — Z12.5 SCREENING FOR MALIGNANT NEOPLASM OF PROSTATE: ICD-10-CM

## 2024-03-01 DIAGNOSIS — M15.9 GENERALIZED OSTEOARTHRITIS: ICD-10-CM

## 2024-03-01 DIAGNOSIS — G89.29 CHRONIC PAIN OF BOTH KNEES: ICD-10-CM

## 2024-03-01 DIAGNOSIS — M25.561 CHRONIC PAIN OF BOTH KNEES: ICD-10-CM

## 2024-03-01 DIAGNOSIS — J30.9 CHRONIC ALLERGIC RHINITIS: Primary | ICD-10-CM

## 2024-03-01 DIAGNOSIS — M25.562 CHRONIC PAIN OF BOTH KNEES: ICD-10-CM

## 2024-03-01 DIAGNOSIS — E78.2 MIXED HYPERLIPIDEMIA: ICD-10-CM

## 2024-03-01 PROCEDURE — 3074F SYST BP LT 130 MM HG: CPT | Performed by: INTERNAL MEDICINE

## 2024-03-01 PROCEDURE — 99214 OFFICE O/P EST MOD 30 MIN: CPT | Performed by: INTERNAL MEDICINE

## 2024-03-01 PROCEDURE — 3078F DIAST BP <80 MM HG: CPT | Performed by: INTERNAL MEDICINE

## 2024-03-01 RX ORDER — IPRATROPIUM BROMIDE 21 UG/1
2 SPRAY, METERED NASAL EVERY 12 HOURS
Qty: 30 ML | Refills: 3 | Status: SHIPPED | OUTPATIENT
Start: 2024-03-01

## 2024-03-01 RX ORDER — ALBUTEROL SULFATE 90 UG/1
2 AEROSOL, METERED RESPIRATORY (INHALATION) 4 TIMES DAILY PRN
Qty: 18 G | Refills: 0 | Status: SHIPPED | OUTPATIENT
Start: 2024-03-01

## 2024-03-01 ASSESSMENT — ENCOUNTER SYMPTOMS
CONSTIPATION: 0
NAUSEA: 0
CHEST TIGHTNESS: 0
VOMITING: 0
SORE THROAT: 0
EYE PAIN: 0
RHINORRHEA: 0
ABDOMINAL PAIN: 0
DIARRHEA: 0
COUGH: 0
BLOOD IN STOOL: 0
BACK PAIN: 1
SHORTNESS OF BREATH: 0

## 2024-03-01 ASSESSMENT — PATIENT HEALTH QUESTIONNAIRE - PHQ9
SUM OF ALL RESPONSES TO PHQ QUESTIONS 1-9: 0
SUM OF ALL RESPONSES TO PHQ9 QUESTIONS 1 & 2: 0
2. FEELING DOWN, DEPRESSED OR HOPELESS: 0
SUM OF ALL RESPONSES TO PHQ QUESTIONS 1-9: 0
SUM OF ALL RESPONSES TO PHQ QUESTIONS 1-9: 0
1. LITTLE INTEREST OR PLEASURE IN DOING THINGS: 0
SUM OF ALL RESPONSES TO PHQ QUESTIONS 1-9: 0

## 2024-03-01 NOTE — PROGRESS NOTES
decrease  - on glucotrol  - on metformin,   - on farxiga   - on tirzepatide   - last A1c was 9.0 (12/2023)    On Statin   On ACE  follows with optho     Benign essential hypertension  - monitor blood pressure at home  - watch diet, low sodium   - on lisinopril   - on metoprolol   - Stable 3/1/2024    Mixed hyperlipidemia  - watch diet   - on lipitor  - follow labs   - last lab (9/2023) borderline     Generalized osteoarthritis  - following with ortho  - s/p synvisc to knee   - stable    Chronic pain of both knees  - following with ortho   - s/p injections     Tobacco use  - Counseled to stop smoking   - stopped 8/2020     Erectile dysfunction, unspecified erectile dysfunction type  - continue present treatment  - poss related to DM and HTN  - stable    Tachycardia  - EKG   - cardiac monitor completed (3/22) -this rhythm, super ventricular ectopy, ventricular premature beats rare  - has seen Cardio   - add metoprolol     Return in about 6 months (around 9/1/2024) for check up and review and labs.    Orders Placed This Encounter   Procedures    CBC with Auto Differential     Standing Status:   Future     Standing Expiration Date:   3/1/2025    Comprehensive Metabolic Panel     Standing Status:   Future     Standing Expiration Date:   3/1/2025    Magnesium     Standing Status:   Future     Standing Expiration Date:   3/1/2025    Lipid, Fasting     Standing Status:   Future     Standing Expiration Date:   3/1/2025    Vitamin D 25 Hydroxy     Standing Status:   Future     Standing Expiration Date:   3/1/2025    Vitamin B12 & Folate     Standing Status:   Future     Standing Expiration Date:   3/1/2025    Urinalysis     Standing Status:   Future     Standing Expiration Date:   3/1/2025    TSH     Standing Status:   Future     Standing Expiration Date:   3/1/2025    Microalbumin, Ur     Standing Status:   Future     Standing Expiration Date:   3/1/2025    PSA Screening     Standing Status:   Future     Standing Expiration

## 2024-03-15 DIAGNOSIS — E78.2 MIXED HYPERLIPIDEMIA: ICD-10-CM

## 2024-03-15 DIAGNOSIS — Z12.5 SCREENING FOR MALIGNANT NEOPLASM OF PROSTATE: ICD-10-CM

## 2024-03-15 DIAGNOSIS — Z79.899 LONG TERM CURRENT USE OF THERAPEUTIC DRUG: ICD-10-CM

## 2024-03-15 DIAGNOSIS — E11.9 TYPE 2 DIABETES MELLITUS WITHOUT COMPLICATION, WITH LONG-TERM CURRENT USE OF INSULIN (HCC): ICD-10-CM

## 2024-03-15 DIAGNOSIS — E55.9 VITAMIN D DEFICIENCY: ICD-10-CM

## 2024-03-15 DIAGNOSIS — Z79.4 TYPE 2 DIABETES MELLITUS WITHOUT COMPLICATION, WITH LONG-TERM CURRENT USE OF INSULIN (HCC): ICD-10-CM

## 2024-03-15 DIAGNOSIS — R53.83 OTHER FATIGUE: ICD-10-CM

## 2024-03-15 LAB
ABSOLUTE IMMATURE GRANULOCYTE: 0.05 K/UL (ref 0–0.58)
ALBUMIN SERPL-MCNC: 4.4 G/DL (ref 3.5–5.2)
ALP BLD-CCNC: 86 U/L (ref 40–129)
ALT SERPL-CCNC: 47 U/L (ref 0–40)
ANION GAP SERPL CALCULATED.3IONS-SCNC: 13 MMOL/L (ref 7–16)
AST SERPL-CCNC: 36 U/L (ref 0–39)
BASOPHILS ABSOLUTE: 0.07 K/UL (ref 0–0.2)
BASOPHILS RELATIVE PERCENT: 1 % (ref 0–2)
BILIRUB SERPL-MCNC: 0.5 MG/DL (ref 0–1.2)
BILIRUBIN URINE: NEGATIVE
BUN BLDV-MCNC: 22 MG/DL (ref 6–20)
CALCIUM SERPL-MCNC: 9.1 MG/DL (ref 8.6–10.2)
CHLORIDE BLD-SCNC: 101 MMOL/L (ref 98–107)
CHOLESTEROL, FASTING: 120 MG/DL
CO2: 24 MMOL/L (ref 22–29)
COLOR: YELLOW
CREAT SERPL-MCNC: 0.9 MG/DL (ref 0.7–1.2)
CREATININE URINE: 225 MG/DL (ref 40–278)
EOSINOPHILS ABSOLUTE: 0.22 K/UL (ref 0.05–0.5)
EOSINOPHILS RELATIVE PERCENT: 2 % (ref 0–6)
FOLATE: 17.3 NG/ML (ref 4.8–24.2)
GFR SERPL CREATININE-BSD FRML MDRD: >60 ML/MIN/1.73M2
GLUCOSE BLD-MCNC: 109 MG/DL (ref 74–99)
GLUCOSE URINE: 500 MG/DL
HBA1C MFR BLD: 7.2 % (ref 4–5.6)
HCT VFR BLD CALC: 45.9 % (ref 37–54)
HDLC SERPL-MCNC: 29 MG/DL
HEMOGLOBIN: 15.2 G/DL (ref 12.5–16.5)
IMMATURE GRANULOCYTES: 1 % (ref 0–5)
KETONES, URINE: ABNORMAL MG/DL
LDL CHOLESTEROL: 70 MG/DL
LEUKOCYTE ESTERASE, URINE: NEGATIVE
LYMPHOCYTES ABSOLUTE: 1.67 K/UL (ref 1.5–4)
LYMPHOCYTES RELATIVE PERCENT: 18 % (ref 20–42)
MAGNESIUM: 1.4 MG/DL (ref 1.6–2.6)
MCH RBC QN AUTO: 30.5 PG (ref 26–35)
MCHC RBC AUTO-ENTMCNC: 33.1 G/DL (ref 32–34.5)
MCV RBC AUTO: 92.2 FL (ref 80–99.9)
MICROALBUMIN/CREAT 24H UR: 80 MG/L (ref 0–19)
MICROALBUMIN/CREAT UR-RTO: 35 MCG/MG CREAT (ref 0–30)
MONOCYTES ABSOLUTE: 0.72 K/UL (ref 0.1–0.95)
MONOCYTES RELATIVE PERCENT: 8 % (ref 2–12)
NEUTROPHILS ABSOLUTE: 6.49 K/UL (ref 1.8–7.3)
NEUTROPHILS RELATIVE PERCENT: 70 % (ref 43–80)
NITRITE, URINE: NEGATIVE
PDW BLD-RTO: 13.3 % (ref 11.5–15)
PH UA: 5.5 (ref 5–9)
PLATELET # BLD: 243 K/UL (ref 130–450)
PMV BLD AUTO: 11.1 FL (ref 7–12)
POTASSIUM SERPL-SCNC: 4.8 MMOL/L (ref 3.5–5)
PROSTATE SPECIFIC ANTIGEN: 0.65 NG/ML (ref 0–4)
PROTEIN UA: ABNORMAL MG/DL
RBC # BLD: 4.98 M/UL (ref 3.8–5.8)
RBC UA: NORMAL /HPF
SODIUM BLD-SCNC: 138 MMOL/L (ref 132–146)
SPECIFIC GRAVITY UA: >1.03 (ref 1–1.03)
TOTAL PROTEIN: 7.2 G/DL (ref 6.4–8.3)
TRIGLYCERIDE, FASTING: 104 MG/DL
TSH SERPL DL<=0.05 MIU/L-ACNC: 1.8 UIU/ML (ref 0.27–4.2)
TURBIDITY: CLEAR
URINE HGB: NEGATIVE
UROBILINOGEN, URINE: 0.2 EU/DL (ref 0–1)
VITAMIN B-12: 801 PG/ML (ref 211–946)
VITAMIN D 25-HYDROXY: 42.9 NG/ML (ref 30–100)
VLDLC SERPL CALC-MCNC: 21 MG/DL
WBC # BLD: 9.2 K/UL (ref 4.5–11.5)
WBC UA: NORMAL /HPF

## 2024-03-17 ENCOUNTER — TELEPHONE (OUTPATIENT)
Dept: PRIMARY CARE CLINIC | Age: 59
End: 2024-03-17

## 2024-03-17 NOTE — TELEPHONE ENCOUNTER
Please let the patient know that blood work results showed    Cholesterol levels were good.  Triglyceride levels were improved when compared to previous.  HDL or good cholesterol was still lower than recommended    Magnesium level was again low.  This could be related to medications and would recommend consideration of magnesium supplement 400 mg daily to twice daily    Sugar was elevated.  Hemoglobin A1c is a measure 3-month sugar control however was much improved when compared to previous now at 7.2    1 liver function test was borderline elevated of uncertain cause.  Other liver functions were normal    BUN level was borderline increase.  This is 1 measure of kidney function.  Other measures of kidney function including creatinine and GFR were normal.  Elevated BUN could be related to medication side effect and/or dehydration    Urine analysis showed sugar ketones slight microscopic and overt protein    Blood counts were normal    Vitamin B12 and folic acid levels are normal    Vitamin D levels were normal    Thyroid levels were normal    PSA for prostate was normal and similar when compared to previous    Other electrolytes were normal    Thanks

## 2024-07-09 LAB
ESTIMATED AVERAGE GLUCOSE: NORMAL
HBA1C MFR BLD: 8.6 %

## 2024-07-15 ENCOUNTER — OFFICE VISIT (OUTPATIENT)
Dept: FAMILY MEDICINE CLINIC | Age: 59
End: 2024-07-15
Payer: COMMERCIAL

## 2024-07-15 VITALS
TEMPERATURE: 97.5 F | WEIGHT: 249 LBS | HEART RATE: 96 BPM | SYSTOLIC BLOOD PRESSURE: 128 MMHG | BODY MASS INDEX: 35.65 KG/M2 | OXYGEN SATURATION: 96 % | RESPIRATION RATE: 18 BRPM | HEIGHT: 70 IN | DIASTOLIC BLOOD PRESSURE: 86 MMHG

## 2024-07-15 DIAGNOSIS — J34.89 SINUS DRAINAGE: ICD-10-CM

## 2024-07-15 DIAGNOSIS — R05.1 ACUTE COUGH: Primary | ICD-10-CM

## 2024-07-15 DIAGNOSIS — R09.81 HEAD CONGESTION: ICD-10-CM

## 2024-07-15 PROCEDURE — 3074F SYST BP LT 130 MM HG: CPT | Performed by: INTERNAL MEDICINE

## 2024-07-15 PROCEDURE — 99213 OFFICE O/P EST LOW 20 MIN: CPT | Performed by: INTERNAL MEDICINE

## 2024-07-15 PROCEDURE — 3079F DIAST BP 80-89 MM HG: CPT | Performed by: INTERNAL MEDICINE

## 2024-07-15 RX ORDER — TAMSULOSIN HYDROCHLORIDE 0.4 MG/1
0.4 CAPSULE ORAL DAILY
COMMUNITY
Start: 2024-06-30

## 2024-07-15 RX ORDER — BROMPHENIRAMINE MALEATE, PSEUDOEPHEDRINE HYDROCHLORIDE, AND DEXTROMETHORPHAN HYDROBROMIDE 2; 30; 10 MG/5ML; MG/5ML; MG/5ML
5 SYRUP ORAL 3 TIMES DAILY PRN
Qty: 120 ML | Refills: 0 | Status: SHIPPED | OUTPATIENT
Start: 2024-07-15

## 2024-07-15 RX ORDER — AMOXICILLIN AND CLAVULANATE POTASSIUM 875; 125 MG/1; MG/1
1 TABLET, FILM COATED ORAL 2 TIMES DAILY
Qty: 14 TABLET | Refills: 0 | Status: SHIPPED | OUTPATIENT
Start: 2024-07-15 | End: 2024-07-22

## 2024-07-15 ASSESSMENT — ENCOUNTER SYMPTOMS
EYES NEGATIVE: 1
VOMITING: 0
ABDOMINAL PAIN: 0
WHEEZING: 0
SHORTNESS OF BREATH: 0
CHEST TIGHTNESS: 0
SINUS PRESSURE: 0
COUGH: 1
DIARRHEA: 1
NAUSEA: 0
SORE THROAT: 0

## 2024-07-15 NOTE — PROGRESS NOTES
[facility]     7/15/24  Daren Green : 1965 Sex: male  Age: 58 y.o.    Chief Complaint   Patient presents with    Cough     X 4 days     Nasal Congestion     X 3 days    Chest Congestion     X 3 days        HPI  Patient presents to express care today complaining of cough, head congestion with sinus drainage colored mucus over the past 3 to 4 days.  States he did test at home for COVID which was negative.  States is going around the office and everybody has been sick recently.          Review of Systems   Constitutional:  Negative for chills and fever.   HENT:  Positive for congestion and postnasal drip. Negative for ear pain, sinus pressure and sore throat.    Eyes: Negative.    Respiratory:  Positive for cough. Negative for chest tightness, shortness of breath and wheezing.    Cardiovascular:  Negative for chest pain.   Gastrointestinal:  Positive for diarrhea. Negative for abdominal pain, nausea and vomiting.        Did have a bout of diarrhea last week which is resolved.   Endocrine:        Type 2 diabetes which she states is well-controlled.   Musculoskeletal:  Negative for myalgias.   Neurological:  Negative for headaches.               REST OF PERTINENT ROS GONE OVER AND WAS NEGATIVE.                 Current Outpatient Medications:     tamsulosin (FLOMAX) 0.4 MG capsule, Take 1 capsule by mouth daily, Disp: , Rfl:     brompheniramine-pseudoephedrine-DM 2-30-10 MG/5ML syrup, Take 5 mLs by mouth 3 times daily as needed for Congestion or Cough, Disp: 120 mL, Rfl: 0    amoxicillin-clavulanate (AUGMENTIN) 875-125 MG per tablet, Take 1 tablet by mouth 2 times daily for 7 days, Disp: 14 tablet, Rfl: 0    albuterol sulfate HFA (VENTOLIN HFA) 108 (90 Base) MCG/ACT inhaler, Inhale 2 puffs into the lungs 4 times daily as needed for Wheezing, Disp: 18 g, Rfl: 0    ipratropium (ATROVENT) 0.03 % nasal spray, 2 sprays by Each Nostril route in the morning and 2 sprays in the evening., Disp: 30 mL, Rfl:

## 2024-09-02 SDOH — ECONOMIC STABILITY: FOOD INSECURITY: WITHIN THE PAST 12 MONTHS, YOU WORRIED THAT YOUR FOOD WOULD RUN OUT BEFORE YOU GOT MONEY TO BUY MORE.: NEVER TRUE

## 2024-09-02 SDOH — ECONOMIC STABILITY: INCOME INSECURITY: HOW HARD IS IT FOR YOU TO PAY FOR THE VERY BASICS LIKE FOOD, HOUSING, MEDICAL CARE, AND HEATING?: NOT HARD AT ALL

## 2024-09-03 ENCOUNTER — OFFICE VISIT (OUTPATIENT)
Dept: FAMILY MEDICINE CLINIC | Age: 59
End: 2024-09-03
Payer: COMMERCIAL

## 2024-09-03 VITALS
OXYGEN SATURATION: 97 % | DIASTOLIC BLOOD PRESSURE: 74 MMHG | TEMPERATURE: 97.9 F | RESPIRATION RATE: 18 BRPM | WEIGHT: 253 LBS | SYSTOLIC BLOOD PRESSURE: 118 MMHG | BODY MASS INDEX: 36.22 KG/M2 | HEART RATE: 99 BPM | HEIGHT: 70 IN

## 2024-09-03 DIAGNOSIS — E78.2 MIXED HYPERLIPIDEMIA: ICD-10-CM

## 2024-09-03 DIAGNOSIS — J30.9 CHRONIC ALLERGIC RHINITIS: Primary | ICD-10-CM

## 2024-09-03 DIAGNOSIS — I10 BENIGN ESSENTIAL HYPERTENSION: ICD-10-CM

## 2024-09-03 DIAGNOSIS — Z79.4 TYPE 2 DIABETES MELLITUS WITHOUT COMPLICATION, WITH LONG-TERM CURRENT USE OF INSULIN (HCC): ICD-10-CM

## 2024-09-03 DIAGNOSIS — Z12.5 SCREENING FOR MALIGNANT NEOPLASM OF PROSTATE: ICD-10-CM

## 2024-09-03 DIAGNOSIS — E11.9 TYPE 2 DIABETES MELLITUS WITHOUT COMPLICATION, WITH LONG-TERM CURRENT USE OF INSULIN (HCC): ICD-10-CM

## 2024-09-03 DIAGNOSIS — R53.83 OTHER FATIGUE: ICD-10-CM

## 2024-09-03 DIAGNOSIS — R00.0 TACHYCARDIA: ICD-10-CM

## 2024-09-03 PROCEDURE — 3074F SYST BP LT 130 MM HG: CPT | Performed by: INTERNAL MEDICINE

## 2024-09-03 PROCEDURE — 99214 OFFICE O/P EST MOD 30 MIN: CPT | Performed by: INTERNAL MEDICINE

## 2024-09-03 PROCEDURE — 3078F DIAST BP <80 MM HG: CPT | Performed by: INTERNAL MEDICINE

## 2024-09-03 PROCEDURE — 3052F HG A1C>EQUAL 8.0%<EQUAL 9.0%: CPT | Performed by: INTERNAL MEDICINE

## 2024-09-03 RX ORDER — METOPROLOL SUCCINATE 25 MG/1
25 TABLET, EXTENDED RELEASE ORAL DAILY
Qty: 90 TABLET | Refills: 3 | Status: SHIPPED | OUTPATIENT
Start: 2024-09-03

## 2024-09-03 RX ORDER — LISINOPRIL 20 MG/1
20 TABLET ORAL 2 TIMES DAILY
Qty: 180 TABLET | Refills: 3 | Status: SHIPPED | OUTPATIENT
Start: 2024-09-03

## 2024-09-03 RX ORDER — TAMSULOSIN HYDROCHLORIDE 0.4 MG/1
0.4 CAPSULE ORAL DAILY
Qty: 90 CAPSULE | Refills: 3 | Status: SHIPPED | OUTPATIENT
Start: 2024-09-03

## 2024-09-03 SDOH — ECONOMIC STABILITY: INCOME INSECURITY: HOW HARD IS IT FOR YOU TO PAY FOR THE VERY BASICS LIKE FOOD, HOUSING, MEDICAL CARE, AND HEATING?: NOT HARD AT ALL

## 2024-09-03 SDOH — ECONOMIC STABILITY: FOOD INSECURITY: WITHIN THE PAST 12 MONTHS, YOU WORRIED THAT YOUR FOOD WOULD RUN OUT BEFORE YOU GOT MONEY TO BUY MORE.: NEVER TRUE

## 2024-09-03 SDOH — ECONOMIC STABILITY: FOOD INSECURITY: WITHIN THE PAST 12 MONTHS, THE FOOD YOU BOUGHT JUST DIDN'T LAST AND YOU DIDN'T HAVE MONEY TO GET MORE.: NEVER TRUE

## 2024-09-03 ASSESSMENT — ENCOUNTER SYMPTOMS
VOMITING: 0
COUGH: 0
DIARRHEA: 0
ABDOMINAL PAIN: 0
CONSTIPATION: 0
CHEST TIGHTNESS: 0
EYE PAIN: 0
BACK PAIN: 1
SHORTNESS OF BREATH: 0
SORE THROAT: 0
RHINORRHEA: 0
NAUSEA: 0
BLOOD IN STOOL: 0

## 2024-12-02 ENCOUNTER — OFFICE VISIT (OUTPATIENT)
Dept: CHIROPRACTIC MEDICINE | Age: 59
End: 2024-12-02
Payer: COMMERCIAL

## 2024-12-02 VITALS
SYSTOLIC BLOOD PRESSURE: 108 MMHG | TEMPERATURE: 97.3 F | HEART RATE: 112 BPM | DIASTOLIC BLOOD PRESSURE: 78 MMHG | HEIGHT: 70 IN | BODY MASS INDEX: 36.93 KG/M2 | OXYGEN SATURATION: 97 % | WEIGHT: 257.94 LBS

## 2024-12-02 DIAGNOSIS — M54.2 CERVICALGIA: Primary | ICD-10-CM

## 2024-12-02 DIAGNOSIS — M54.04 PANNICULITIS AFFECTING REGIONS OF NECK AND BACK, THORACIC REGION: ICD-10-CM

## 2024-12-02 DIAGNOSIS — M62.830 MUSCLE SPASM OF BACK: ICD-10-CM

## 2024-12-02 PROCEDURE — 97014 ELECTRIC STIMULATION THERAPY: CPT | Performed by: CHIROPRACTOR

## 2024-12-02 PROCEDURE — 3074F SYST BP LT 130 MM HG: CPT | Performed by: CHIROPRACTOR

## 2024-12-02 PROCEDURE — 3078F DIAST BP <80 MM HG: CPT | Performed by: CHIROPRACTOR

## 2024-12-02 PROCEDURE — 99212 OFFICE O/P EST SF 10 MIN: CPT | Performed by: CHIROPRACTOR

## 2024-12-02 PROCEDURE — 98940 CHIROPRACT MANJ 1-2 REGIONS: CPT | Performed by: CHIROPRACTOR

## 2024-12-02 NOTE — PROGRESS NOTES
Patient is here for back pain. Patient states no recent injury. Increased pain over the last week. Zhen Rosas MD  Electronically signed by Sofia Meier LPN on 12/2/2024 at 3:25 PM

## 2024-12-02 NOTE — PROGRESS NOTES
24  Daren Green : 1965 Sex: male  Age: 59 y.o.    Chief Complaint   Patient presents with    Back Pain       HPI:   Last seen in 2024.  Reportedly did well after that visit.  Golfed a lot this summer.  Moving some gogo totes a week or so ago, had some R sided midback pain.  Seems to have improved. Today, neck/upper back mainly.    No UE/LE symptoms.    No LBP    Current Outpatient Medications:     metoprolol succinate (TOPROL XL) 25 MG extended release tablet, Take 1 tablet by mouth daily, Disp: 90 tablet, Rfl: 3    lisinopril (PRINIVIL;ZESTRIL) 20 MG tablet, Take 1 tablet by mouth 2 times daily, Disp: 180 tablet, Rfl: 3    tamsulosin (FLOMAX) 0.4 MG capsule, Take 1 capsule by mouth daily, Disp: 90 capsule, Rfl: 3    albuterol sulfate HFA (VENTOLIN HFA) 108 (90 Base) MCG/ACT inhaler, Inhale 2 puffs into the lungs 4 times daily as needed for Wheezing, Disp: 18 g, Rfl: 0    OZEMPIC, 0.25 OR 0.5 MG/DOSE, 2 MG/3ML SOPN, INJECT 0.5 MG SUBCUTANEOUSLY ONCE EVERY WEEK ON THE SAME DAY EACH WEEK, Disp: , Rfl:     Multiple Vitamin TABS, Take by mouth, Disp: , Rfl:     FARXIGA 10 MG tablet, TAKE 1 TABLET BY MOUTH EVERY DAY, Disp: , Rfl:     atorvastatin (LIPITOR) 40 MG tablet, Take 1 tablet by mouth daily, Disp: , Rfl:     insulin glargine (LANTUS;BASAGLAR) 100 UNIT/ML injection pen, Inject 50 Units into the skin every morning, Disp: , Rfl:     metFORMIN (GLUCOPHAGE) 850 MG tablet, Take 1 tablet by mouth 3 times daily, Disp: , Rfl:     glipiZIDE (GLUCOTROL) 5 MG tablet, Take 2 tablets by mouth Taking 4 tabs q am, Disp: , Rfl:     Exam:   Vitals:    24 1524   BP: 108/78   Pulse: (!) 112   Temp: 97.3 °F (36.3 °C)   SpO2: 97%     He appears well.  No acute distress.  He has an active trigger point the right trapezius today.  No midline pain in the cervical or thoracic regions.  In neutral, cervical compression and distraction test are unremarkable.  Maximum cervical rotatory compression testing

## 2024-12-04 ENCOUNTER — OFFICE VISIT (OUTPATIENT)
Dept: FAMILY MEDICINE CLINIC | Age: 59
End: 2024-12-04
Payer: COMMERCIAL

## 2024-12-04 VITALS
HEART RATE: 110 BPM | OXYGEN SATURATION: 99 % | BODY MASS INDEX: 36.51 KG/M2 | WEIGHT: 255 LBS | DIASTOLIC BLOOD PRESSURE: 80 MMHG | HEIGHT: 70 IN | TEMPERATURE: 98.6 F | SYSTOLIC BLOOD PRESSURE: 126 MMHG

## 2024-12-04 DIAGNOSIS — J40 BRONCHITIS: Primary | ICD-10-CM

## 2024-12-04 PROCEDURE — 3079F DIAST BP 80-89 MM HG: CPT | Performed by: NURSE PRACTITIONER

## 2024-12-04 PROCEDURE — 3074F SYST BP LT 130 MM HG: CPT | Performed by: NURSE PRACTITIONER

## 2024-12-04 PROCEDURE — 99213 OFFICE O/P EST LOW 20 MIN: CPT | Performed by: NURSE PRACTITIONER

## 2024-12-04 RX ORDER — AZITHROMYCIN 250 MG/1
TABLET, FILM COATED ORAL
Qty: 6 TABLET | Refills: 0 | Status: SHIPPED | OUTPATIENT
Start: 2024-12-04 | End: 2024-12-14

## 2024-12-04 RX ORDER — BENZONATATE 100 MG/1
100 CAPSULE ORAL 3 TIMES DAILY PRN
Qty: 30 CAPSULE | Refills: 0 | Status: SHIPPED | OUTPATIENT
Start: 2024-12-04 | End: 2024-12-14

## 2024-12-04 RX ORDER — METHYLPREDNISOLONE 4 MG/1
TABLET ORAL
Qty: 1 KIT | Refills: 0 | Status: SHIPPED | OUTPATIENT
Start: 2024-12-04

## 2024-12-04 NOTE — PROGRESS NOTES
nursing note reviewed.   Constitutional:       Appearance: Normal appearance. He is normal weight.   HENT:      Head: Normocephalic and atraumatic.      Right Ear: External ear normal.      Left Ear: External ear normal.      Nose: Congestion and rhinorrhea present.      Mouth/Throat:      Mouth: Mucous membranes are moist.      Pharynx: Oropharynx is clear. Posterior oropharyngeal erythema present.      Comments: Postnasal drip present.  Neck:      Thyroid: No thyroid mass, thyromegaly or thyroid tenderness.      Trachea: Trachea normal.   Cardiovascular:      Rate and Rhythm: Normal rate and regular rhythm.      Heart sounds: Normal heart sounds.   Pulmonary:      Effort: Pulmonary effort is normal.      Breath sounds: Normal breath sounds.      Comments: Congested cough on exam  Musculoskeletal:         General: Normal range of motion.      Cervical back: Normal range of motion and neck supple.   Lymphadenopathy:      Cervical: No cervical adenopathy.   Skin:     General: Skin is warm and dry.      Capillary Refill: Capillary refill takes less than 2 seconds.   Neurological:      General: No focal deficit present.      Mental Status: He is alert and oriented to person, place, and time.      Sensory: Sensation is intact.      Motor: Motor function is intact.      Coordination: Coordination is intact.      Gait: Gait is intact.      Deep Tendon Reflexes: Reflexes normal.   Psychiatric:         Attention and Perception: Attention and perception normal.         Mood and Affect: Mood normal.         Speech: Speech normal.         Behavior: Behavior normal.         Thought Content: Thought content normal.         Cognition and Memory: Cognition and memory normal.         Judgment: Judgment normal.           Lab / Imaging Results   (All laboratory and radiology results have been personally reviewed by myself)  Labs:  No results found for this visit on 12/04/24.    Imaging:  All Radiology results interpreted by

## 2025-01-02 LAB
ESTIMATED AVERAGE GLUCOSE: NORMAL
HBA1C MFR BLD: 7.8 %

## 2025-01-16 ENCOUNTER — OFFICE VISIT (OUTPATIENT)
Dept: FAMILY MEDICINE CLINIC | Age: 60
End: 2025-01-16
Payer: COMMERCIAL

## 2025-01-16 VITALS
OXYGEN SATURATION: 98 % | HEIGHT: 70 IN | SYSTOLIC BLOOD PRESSURE: 138 MMHG | HEART RATE: 100 BPM | DIASTOLIC BLOOD PRESSURE: 80 MMHG | TEMPERATURE: 97.8 F | BODY MASS INDEX: 35.79 KG/M2 | WEIGHT: 250 LBS

## 2025-01-16 DIAGNOSIS — R05.1 ACUTE COUGH: ICD-10-CM

## 2025-01-16 DIAGNOSIS — R06.2 WHEEZE: ICD-10-CM

## 2025-01-16 DIAGNOSIS — H69.93 DYSFUNCTION OF BOTH EUSTACHIAN TUBES: ICD-10-CM

## 2025-01-16 DIAGNOSIS — J34.89 SINUS DRAINAGE: Primary | ICD-10-CM

## 2025-01-16 PROCEDURE — 99213 OFFICE O/P EST LOW 20 MIN: CPT | Performed by: INTERNAL MEDICINE

## 2025-01-16 PROCEDURE — 3079F DIAST BP 80-89 MM HG: CPT | Performed by: INTERNAL MEDICINE

## 2025-01-16 PROCEDURE — 3075F SYST BP GE 130 - 139MM HG: CPT | Performed by: INTERNAL MEDICINE

## 2025-01-16 RX ORDER — ALBUTEROL SULFATE 90 UG/1
2 INHALANT RESPIRATORY (INHALATION) 4 TIMES DAILY PRN
Qty: 18 G | Refills: 0 | Status: SHIPPED | OUTPATIENT
Start: 2025-01-16

## 2025-01-16 RX ORDER — PREDNISONE 10 MG/1
TABLET ORAL
Qty: 12 TABLET | Refills: 0 | Status: SHIPPED | OUTPATIENT
Start: 2025-01-16 | End: 2025-01-21

## 2025-01-16 ASSESSMENT — ENCOUNTER SYMPTOMS
ABDOMINAL PAIN: 0
CHEST TIGHTNESS: 0
SINUS PRESSURE: 0
WHEEZING: 1
SORE THROAT: 1
COUGH: 1
NAUSEA: 0
EYES NEGATIVE: 1
SHORTNESS OF BREATH: 0
DIARRHEA: 0
VOMITING: 0

## 2025-01-16 NOTE — PROGRESS NOTES
Normal heart sounds. No murmur heard.  Pulmonary:      Effort: Pulmonary effort is normal. No respiratory distress.      Breath sounds: Normal breath sounds. No wheezing, rhonchi or rales.   Musculoskeletal:      Cervical back: Normal range of motion and neck supple. No tenderness.   Lymphadenopathy:      Cervical: No cervical adenopathy.   Skin:     General: Skin is warm and dry.   Neurological:      Mental Status: He is alert and oriented to person, place, and time.   Psychiatric:         Mood and Affect: Mood normal.         Behavior: Behavior normal.         Thought Content: Thought content normal.         Judgment: Judgment normal.                   Assessment and Plan:  Daren was seen today for congestion and cough.    Diagnoses and all orders for this visit:    Sinus drainage    Dysfunction of both eustachian tubes    Acute cough    Wheeze    Other orders  -     albuterol sulfate HFA (VENTOLIN HFA) 108 (90 Base) MCG/ACT inhaler; Inhale 2 puffs into the lungs 4 times daily as needed for Wheezing or Shortness of Breath  -     predniSONE (DELTASONE) 10 MG tablet; Take 3 tablets by mouth daily for 2 days, THEN 2 tablets daily for 2 days, THEN 1 tablet daily for 2 days.  -     amoxicillin-clavulanate (AUGMENTIN) 875-125 MG per tablet; Take 1 tablet by mouth 2 times daily for 7 days    Plan: Will treat with prednisone, Augmentin, albuterol.  Warned of potential side effects.  Including raising his blood sugar with his diabetes.  He states he is going to watch it.  Probiotic.  Push fluids.  Follow-up with PCP.  Notify us if not improving.    Return for fu pcp.    Seen By:  Danyel Johnson MD      *Document was created using voice recognition software.  Note was reviewed however may contain grammatical errors.

## 2025-03-13 RX ORDER — TAMSULOSIN HYDROCHLORIDE 0.4 MG/1
0.4 CAPSULE ORAL DAILY
Qty: 90 CAPSULE | Refills: 1 | Status: SHIPPED | OUTPATIENT
Start: 2025-03-13

## 2025-03-13 NOTE — TELEPHONE ENCOUNTER
Last Appointment:  9/3/2024  Future Appointments   Date Time Provider Department Center   6/3/2025  3:30 PM Zhen Rosas MD [facility]

## 2025-04-20 DIAGNOSIS — I10 BENIGN ESSENTIAL HYPERTENSION: ICD-10-CM

## 2025-04-20 DIAGNOSIS — R00.0 TACHYCARDIA: ICD-10-CM

## 2025-04-21 RX ORDER — METOPROLOL SUCCINATE 25 MG/1
25 TABLET, EXTENDED RELEASE ORAL DAILY
Qty: 90 TABLET | Refills: 0 | Status: SHIPPED | OUTPATIENT
Start: 2025-04-21

## 2025-04-21 RX ORDER — LISINOPRIL 20 MG/1
20 TABLET ORAL 2 TIMES DAILY
Qty: 180 TABLET | Refills: 0 | Status: SHIPPED | OUTPATIENT
Start: 2025-04-21

## 2025-04-21 NOTE — TELEPHONE ENCOUNTER
Last Appointment:  9/3/2024  Future Appointments   Date Time Provider Department Center   6/3/2025  3:30 PM Zhen Rosas MD [facility]

## 2025-04-22 LAB
ESTIMATED AVERAGE GLUCOSE: NORMAL
HBA1C MFR BLD: 7.5 %

## 2025-05-23 ENCOUNTER — OFFICE VISIT (OUTPATIENT)
Dept: FAMILY MEDICINE CLINIC | Age: 60
End: 2025-05-23
Payer: COMMERCIAL

## 2025-05-23 VITALS
SYSTOLIC BLOOD PRESSURE: 120 MMHG | TEMPERATURE: 97.8 F | HEART RATE: 86 BPM | DIASTOLIC BLOOD PRESSURE: 84 MMHG | WEIGHT: 258 LBS | OXYGEN SATURATION: 97 % | BODY MASS INDEX: 37.02 KG/M2

## 2025-05-23 DIAGNOSIS — J34.89 SINUS DRAINAGE: ICD-10-CM

## 2025-05-23 DIAGNOSIS — R05.1 ACUTE COUGH: ICD-10-CM

## 2025-05-23 DIAGNOSIS — J02.9 SORE THROAT: ICD-10-CM

## 2025-05-23 DIAGNOSIS — H69.93 DYSFUNCTION OF BOTH EUSTACHIAN TUBES: ICD-10-CM

## 2025-05-23 DIAGNOSIS — J32.9 SINOBRONCHITIS: ICD-10-CM

## 2025-05-23 DIAGNOSIS — J40 SINOBRONCHITIS: ICD-10-CM

## 2025-05-23 LAB — S PYO AG THROAT QL: NORMAL

## 2025-05-23 PROCEDURE — 99213 OFFICE O/P EST LOW 20 MIN: CPT | Performed by: INTERNAL MEDICINE

## 2025-05-23 PROCEDURE — 3074F SYST BP LT 130 MM HG: CPT | Performed by: INTERNAL MEDICINE

## 2025-05-23 PROCEDURE — 87880 STREP A ASSAY W/OPTIC: CPT | Performed by: INTERNAL MEDICINE

## 2025-05-23 PROCEDURE — 3079F DIAST BP 80-89 MM HG: CPT | Performed by: INTERNAL MEDICINE

## 2025-05-23 RX ORDER — TIRZEPATIDE 12.5 MG/.5ML
INJECTION, SOLUTION SUBCUTANEOUS
COMMUNITY
Start: 2025-05-02

## 2025-05-23 RX ORDER — PREDNISONE 10 MG/1
TABLET ORAL
Qty: 12 TABLET | Refills: 0 | Status: SHIPPED | OUTPATIENT
Start: 2025-05-23 | End: 2025-05-29

## 2025-05-23 RX ORDER — CODEINE PHOSPHATE AND GUAIFENESIN 10; 100 MG/5ML; MG/5ML
5 SOLUTION ORAL 3 TIMES DAILY PRN
Qty: 60 ML | Refills: 0 | Status: SHIPPED | OUTPATIENT
Start: 2025-05-23 | End: 2025-05-27

## 2025-05-23 ASSESSMENT — ENCOUNTER SYMPTOMS
DIARRHEA: 0
WHEEZING: 1
SORE THROAT: 1
COUGH: 1
EYES NEGATIVE: 1
NAUSEA: 0
ABDOMINAL PAIN: 0
CHEST TIGHTNESS: 0
VOMITING: 0
SHORTNESS OF BREATH: 0
SINUS PRESSURE: 0

## 2025-05-23 NOTE — PROGRESS NOTES
family history on file.  Social History     Socioeconomic History    Marital status:      Spouse name: Krysten    Number of children: 2    Years of education: 15    Highest education level: Some college, no degree   Occupational History    Occupation:    Tobacco Use    Smoking status: Former     Current packs/day: 0.00     Types: Cigarettes     Start date: 2000     Quit date: 2020     Years since quittin.7    Smokeless tobacco: Former     Types: Chew    Tobacco comments:     5-6 cigarettes qd, ocassional cigar   Vaping Use    Vaping status: Never Used   Substance and Sexual Activity    Alcohol use: Yes     Alcohol/week: 4.0 standard drinks of alcohol     Types: 4 Cans of beer per week    Drug use: No    Sexual activity: Not on file   Other Topics Concern    Not on file   Social History Narrative    Not on file     Social Drivers of Health     Financial Resource Strain: Low Risk  (9/3/2024)    Overall Financial Resource Strain (CARDIA)     Difficulty of Paying Living Expenses: Not hard at all   Food Insecurity: No Food Insecurity (9/3/2024)    Hunger Vital Sign     Worried About Running Out of Food in the Last Year: Never true     Ran Out of Food in the Last Year: Never true   Transportation Needs: Unknown (9/3/2024)    PRAPARE - Transportation     Lack of Transportation (Medical): Not on file     Lack of Transportation (Non-Medical): No   Physical Activity: Not on file   Stress: Not on file   Social Connections: Not on file   Intimate Partner Violence: Not on file   Housing Stability: Unknown (9/3/2024)    Housing Stability Vital Sign     Unable to Pay for Housing in the Last Year: Not on file     Number of Times Moved in the Last Year: Not on file     Homeless in the Last Year: No       Vitals:    25 0851   BP: 120/84   Pulse: 86   Temp: 97.8 °F (36.6 °C)   SpO2: 97%   Weight: 117 kg (258 lb)       Physical Exam  Vitals and nursing note reviewed.   Constitutional:       General: He

## 2025-05-31 SDOH — ECONOMIC STABILITY: FOOD INSECURITY: WITHIN THE PAST 12 MONTHS, YOU WORRIED THAT YOUR FOOD WOULD RUN OUT BEFORE YOU GOT MONEY TO BUY MORE.: NEVER TRUE

## 2025-05-31 SDOH — ECONOMIC STABILITY: INCOME INSECURITY: IN THE LAST 12 MONTHS, WAS THERE A TIME WHEN YOU WERE NOT ABLE TO PAY THE MORTGAGE OR RENT ON TIME?: NO

## 2025-05-31 SDOH — ECONOMIC STABILITY: FOOD INSECURITY: WITHIN THE PAST 12 MONTHS, THE FOOD YOU BOUGHT JUST DIDN'T LAST AND YOU DIDN'T HAVE MONEY TO GET MORE.: NEVER TRUE

## 2025-05-31 ASSESSMENT — PATIENT HEALTH QUESTIONNAIRE - PHQ9
2. FEELING DOWN, DEPRESSED OR HOPELESS: NOT AT ALL
SUM OF ALL RESPONSES TO PHQ QUESTIONS 1-9: 0
SUM OF ALL RESPONSES TO PHQ QUESTIONS 1-9: 0
1. LITTLE INTEREST OR PLEASURE IN DOING THINGS: NOT AT ALL
1. LITTLE INTEREST OR PLEASURE IN DOING THINGS: NOT AT ALL
SUM OF ALL RESPONSES TO PHQ QUESTIONS 1-9: 0
SUM OF ALL RESPONSES TO PHQ QUESTIONS 1-9: 0
SUM OF ALL RESPONSES TO PHQ9 QUESTIONS 1 & 2: 0
2. FEELING DOWN, DEPRESSED OR HOPELESS: NOT AT ALL

## 2025-06-03 ENCOUNTER — OFFICE VISIT (OUTPATIENT)
Dept: FAMILY MEDICINE CLINIC | Age: 60
End: 2025-06-03
Payer: COMMERCIAL

## 2025-06-03 VITALS
BODY MASS INDEX: 36.51 KG/M2 | DIASTOLIC BLOOD PRESSURE: 70 MMHG | WEIGHT: 255 LBS | SYSTOLIC BLOOD PRESSURE: 124 MMHG | OXYGEN SATURATION: 96 % | RESPIRATION RATE: 20 BRPM | HEIGHT: 70 IN | HEART RATE: 104 BPM | TEMPERATURE: 98.6 F

## 2025-06-03 DIAGNOSIS — J06.9 ACUTE UPPER RESPIRATORY INFECTION: Primary | ICD-10-CM

## 2025-06-03 DIAGNOSIS — R00.0 TACHYCARDIA: ICD-10-CM

## 2025-06-03 DIAGNOSIS — I10 BENIGN ESSENTIAL HYPERTENSION: ICD-10-CM

## 2025-06-03 PROCEDURE — 3074F SYST BP LT 130 MM HG: CPT | Performed by: INTERNAL MEDICINE

## 2025-06-03 PROCEDURE — 99213 OFFICE O/P EST LOW 20 MIN: CPT | Performed by: INTERNAL MEDICINE

## 2025-06-03 PROCEDURE — 3078F DIAST BP <80 MM HG: CPT | Performed by: INTERNAL MEDICINE

## 2025-06-03 RX ORDER — METOPROLOL SUCCINATE 25 MG/1
25 TABLET, EXTENDED RELEASE ORAL DAILY
Qty: 90 TABLET | Refills: 3 | Status: SHIPPED | OUTPATIENT
Start: 2025-06-03

## 2025-06-03 RX ORDER — LISINOPRIL 20 MG/1
20 TABLET ORAL 2 TIMES DAILY
Qty: 180 TABLET | Refills: 3 | Status: SHIPPED | OUTPATIENT
Start: 2025-06-03

## 2025-06-03 RX ORDER — TAMSULOSIN HYDROCHLORIDE 0.4 MG/1
0.4 CAPSULE ORAL DAILY
Qty: 90 CAPSULE | Refills: 3 | Status: SHIPPED | OUTPATIENT
Start: 2025-06-03

## 2025-06-03 RX ORDER — GLIPIZIDE 10 MG/1
10 TABLET ORAL
COMMUNITY
Start: 2025-04-01

## 2025-06-03 RX ORDER — DOXYCYCLINE HYCLATE 100 MG
100 TABLET ORAL 2 TIMES DAILY
Qty: 14 TABLET | Refills: 0 | Status: SHIPPED | OUTPATIENT
Start: 2025-06-03 | End: 2025-06-10

## 2025-06-03 ASSESSMENT — ENCOUNTER SYMPTOMS
RHINORRHEA: 0
COUGH: 0
DIARRHEA: 0
SHORTNESS OF BREATH: 0
CONSTIPATION: 0
SORE THROAT: 0
EYE PAIN: 0
ABDOMINAL PAIN: 0
CHEST TIGHTNESS: 0
BACK PAIN: 1
NAUSEA: 0
BLOOD IN STOOL: 0
VOMITING: 0

## 2025-06-03 NOTE — PROGRESS NOTES
[facility]   Internal Medicine     6/3/2025  Daren Green : 1965 Sex: male  Age:59 y.o.    Chief Complaint   Patient presents with    Follow-up     Routine 6 month f/u     Cough     In express care a  couple weeks ago, but no better     Congestion        HPI:   Patient presents to office for evaluation of the following medical concerns.     - States has been seen in urgent care for upper respiratory symptoms. States was given prednisone taper and Augmentin. States has not improved   - States still congestion and cough and rhinorrhea. No fever     ortho walk in () -arthritis of the right shoulder previous x-rays negative during urgent care intra-articular cortisone injection with lidocaine and Kenalog home exercise program. States did not help. States self referred to ortho. Had MRI. States had right rotator cuff repair (2023).   - States in physical therapy.   - op () right shoulder arthroscopy with revision of subacromial decompression, distal clavicle resection, rotator cuff repair. States improved, still having decreased strength.     - States b/l knee pain. States has h/o knee surgery   - States injury to left knee - hyperflexion      anesth notif () -signs of sleep apnea BMI snoring consider sleep study. Declines evaluation 6/3/2025    - Cardiac monitor (3/22) -this rhythm, super ventricular ectopy, ventricular premature beats rare. On metoprolol     - States has chronic allergies. States taking allavert as needed. Off singulair, Has seen allergy in the past. still has nasal congestion and rhinorrhea. No ear pain. No facial pressure. No fever of chills. No chest pain, SOB. States cough. Has ProAir inhaler if needed, uses sparingly.    - States erectile issues are stable. States having difficulty maintaining erection. Stable.    - States has Diabetes. Follows with endocrinology. On Basaglar and glipizide and metformin tid and faxiga and ozempic. States some hypoglycemia.

## 2025-07-22 LAB
ESTIMATED AVERAGE GLUCOSE: NORMAL
HBA1C MFR BLD: 6.8 %

## 2025-09-05 ENCOUNTER — OFFICE VISIT (OUTPATIENT)
Dept: FAMILY MEDICINE CLINIC | Age: 60
End: 2025-09-05
Payer: COMMERCIAL

## 2025-09-05 VITALS
WEIGHT: 255 LBS | HEIGHT: 70 IN | HEART RATE: 112 BPM | BODY MASS INDEX: 36.51 KG/M2 | OXYGEN SATURATION: 96 % | SYSTOLIC BLOOD PRESSURE: 102 MMHG | DIASTOLIC BLOOD PRESSURE: 70 MMHG | TEMPERATURE: 97.1 F

## 2025-09-05 DIAGNOSIS — J32.9 SINOBRONCHITIS: Primary | ICD-10-CM

## 2025-09-05 DIAGNOSIS — J34.89 SINUS DRAINAGE: ICD-10-CM

## 2025-09-05 DIAGNOSIS — R05.1 ACUTE COUGH: ICD-10-CM

## 2025-09-05 DIAGNOSIS — J40 SINOBRONCHITIS: Primary | ICD-10-CM

## 2025-09-05 DIAGNOSIS — H69.93 DYSFUNCTION OF BOTH EUSTACHIAN TUBES: ICD-10-CM

## 2025-09-05 PROCEDURE — G8417 CALC BMI ABV UP PARAM F/U: HCPCS | Performed by: INTERNAL MEDICINE

## 2025-09-05 PROCEDURE — 3078F DIAST BP <80 MM HG: CPT | Performed by: INTERNAL MEDICINE

## 2025-09-05 PROCEDURE — 1036F TOBACCO NON-USER: CPT | Performed by: INTERNAL MEDICINE

## 2025-09-05 PROCEDURE — 3017F COLORECTAL CA SCREEN DOC REV: CPT | Performed by: INTERNAL MEDICINE

## 2025-09-05 PROCEDURE — G8427 DOCREV CUR MEDS BY ELIG CLIN: HCPCS | Performed by: INTERNAL MEDICINE

## 2025-09-05 PROCEDURE — 99213 OFFICE O/P EST LOW 20 MIN: CPT | Performed by: INTERNAL MEDICINE

## 2025-09-05 PROCEDURE — 3074F SYST BP LT 130 MM HG: CPT | Performed by: INTERNAL MEDICINE

## 2025-09-05 RX ORDER — PREDNISONE 10 MG/1
TABLET ORAL
Qty: 12 TABLET | Refills: 0 | Status: SHIPPED | OUTPATIENT
Start: 2025-09-05 | End: 2025-09-11

## 2025-09-05 RX ORDER — DOXYCYCLINE HYCLATE 100 MG
100 TABLET ORAL 2 TIMES DAILY
Qty: 14 TABLET | Refills: 0 | Status: SHIPPED | OUTPATIENT
Start: 2025-09-05 | End: 2025-09-12

## 2025-09-05 ASSESSMENT — ENCOUNTER SYMPTOMS
VOMITING: 0
WHEEZING: 0
COUGH: 1
SINUS PRESSURE: 1
SHORTNESS OF BREATH: 0
CHEST TIGHTNESS: 0
SORE THROAT: 0
ABDOMINAL PAIN: 0
NAUSEA: 0
DIARRHEA: 0
EYES NEGATIVE: 1